# Patient Record
Sex: MALE | Race: WHITE | Employment: UNEMPLOYED | URBAN - METROPOLITAN AREA
[De-identification: names, ages, dates, MRNs, and addresses within clinical notes are randomized per-mention and may not be internally consistent; named-entity substitution may affect disease eponyms.]

---

## 2017-02-10 ENCOUNTER — APPOINTMENT (OUTPATIENT)
Dept: GENERAL RADIOLOGY | Age: 72
End: 2017-02-10
Attending: EMERGENCY MEDICINE
Payer: MEDICARE

## 2017-02-10 ENCOUNTER — HOSPITAL ENCOUNTER (EMERGENCY)
Age: 72
Discharge: HOME | End: 2017-02-10
Attending: EMERGENCY MEDICINE
Payer: MEDICARE

## 2017-02-10 VITALS
HEIGHT: 72 IN | TEMPERATURE: 98.1 F | BODY MASS INDEX: 24.38 KG/M2 | SYSTOLIC BLOOD PRESSURE: 157 MMHG | RESPIRATION RATE: 18 BRPM | WEIGHT: 180 LBS | HEART RATE: 97 BPM | DIASTOLIC BLOOD PRESSURE: 90 MMHG | OXYGEN SATURATION: 98 %

## 2017-02-10 DIAGNOSIS — E11.621 HEEL ULCER DUE TO DM (HCC): Primary | ICD-10-CM

## 2017-02-10 DIAGNOSIS — L97.409 HEEL ULCER DUE TO DM (HCC): Primary | ICD-10-CM

## 2017-02-10 PROCEDURE — 99282 EMERGENCY DEPT VISIT SF MDM: CPT | Performed by: EMERGENCY MEDICINE

## 2017-02-10 PROCEDURE — 74011000258 HC RX REV CODE- 258: Performed by: EMERGENCY MEDICINE

## 2017-02-10 PROCEDURE — 73630 X-RAY EXAM OF FOOT: CPT

## 2017-02-10 PROCEDURE — 74011250636 HC RX REV CODE- 250/636: Performed by: EMERGENCY MEDICINE

## 2017-02-10 NOTE — ED TRIAGE NOTES
Pt in from [facility] assisted living for pressure sore to right heel. Pt refuses to answer any questions in triage. Per staff that has accompanied pt, pt with wound to heel. States was sent to assisted living for unknown reason. Staff states pt c/o pain in heel at home.  Denies fever

## 2017-02-11 NOTE — DISCHARGE INSTRUCTIONS
Diabetes Foot Health: Care Instructions  Your Care Instructions    When you have diabetes, your feet need extra care and attention. Diabetes can damage the nerve endings and blood vessels in your feet, making you less likely to notice when your feet are injured. Diabetes also limits your body's ability to fight infection and get blood to areas that need it. If you get a minor foot injury, it could become an ulcer or a serious infection. With good foot care, you can prevent most of these problems. Caring for your feet can be quick and easy. Most of the care can be done when you are bathing or getting ready for bed. Follow-up care is a key part of your treatment and safety. Be sure to make and go to all appointments, and call your doctor if you are having problems. Its also a good idea to know your test results and keep a list of the medicines you take. How can you care for yourself at home? · Keep your blood sugar close to normal by watching what and how much you eat, monitoring blood sugar, taking medicines if prescribed, and getting regular exercise. · Do not smoke. Smoking affects blood flow and can make foot problems worse. If you need help quitting, talk to your doctor about stop-smoking programs and medicines. These can increase your chances of quitting for good. · Eat a diet that is low in fats. High fat intake can cause fat to build up in your blood vessels and decrease blood flow. · Inspect your feet daily for blisters, cuts, cracks, or sores. If you cannot see well, use a mirror or have someone help you. · Take care of your feet:  [facility] your feet every day. Use warm (not hot) water. Check the water temperature with your wrists or other part of your body, not your feet. ¨ Dry your feet well. Pat them dry. Do not rub the skin on your feet too hard. Dry well between your toes. If the skin on your feet stays moist, bacteria or a fungus can grow, which can lead to infection.   ¨ Keep your skin soft. Use moisturizing skin cream to keep the skin on your feet soft and prevent calluses and cracks. But do not put the cream between your toes, and stop using any cream that causes a rash. ¨ Clean underneath your toenails carefully. Do not use a sharp object to clean underneath your toenails. Use the blunt end of a nail file or other rounded tool. ¨ Trim and file your toenails straight across to prevent ingrown toenails. Use a nail clipper, not scissors. Use an emery board to smooth the edges. · Change socks daily. Socks without seams are best, because seams often rub the feet. You can find socks for people with diabetes from specialty catalogs. · Look inside your shoes every day for things like gravel or torn linings, which could cause blisters or sores. · Buy shoes that fit well:  ¨ Look for shoes that have plenty of space around the toes. This helps prevent bunions and blisters. ¨ Try on shoes while wearing the kind of socks you will usually wear with the shoes. ¨ Avoid plastic shoes. They may rub your feet and cause blisters. Good shoes should be made of materials that are flexible and breathable, such as leather or cloth. ¨ Break in new shoes slowly by wearing them for no more than an hour a day for several days. Take extra time to check your feet for red areas, blisters, or other problems after you wear new shoes. · Do not go barefoot. Do not wear sandals, and do not wear shoes with very thin soles. Thin soles are easy to puncture. They also do not protect your feet from hot pavement or cold weather. · Have your doctor check your feet during each visit. If you have a foot problem, see your doctor. Do not try to treat an early foot problem at home. Home remedies or treatments that you can buy without a prescription (such as corn removers) can be harmful. · Always get early treatment for foot problems. A minor irritation can lead to a major problem if not properly cared for early.   When should you call for help? Call your doctor now or seek immediate medical care if:  · You have a foot sore, an ulcer or break in the skin that is not healing after 4 days, bleeding corns or calluses, or an ingrown toenail. · You have blue or black areas, which can mean bruising or blood flow problems. · You have peeling skin or tiny blisters between your toes or cracking or oozing of the skin. · You have a fever for more than 24 hours and a foot sore. · You have new numbness or tingling in your feet that does not go away after you move your feet or change positions. · You have unexplained or unusual swelling of the foot or ankle. Watch closely for changes in your health, and be sure to contact your doctor if:  · You cannot do proper foot care. Where can you learn more? Go to http://mik-julia.info/. Enter A739 in the search box to learn more about \"Diabetes Foot Health: Care Instructions. \"  Current as of: May 23, 2016  Content Version: 11.1  © 4360-6959 [employer]. Care instructions adapted under license by [employer] (which disclaims liability or warranty for this information). If you have questions about a medical condition or this instruction, always ask your healthcare professional. [address] any warranty or liability for your use of this information.

## 2017-02-11 NOTE — ED NOTES
(LE) Richie Vásquez,  for [facility] given a courtesy call to inform him that the pt was returning to the facility. Mr Misti Brown began questioning what was sone. Attempted to explain to Mr Misti Brown that the ED MD had attempted to do a more extensive workup for the pt and the pt had refused all interventions. Mr Misti Brown then began demanding to speak to [facility] that knew what was going on and that Dr Long Forrest from the [place] needed to be called. \"  Attempted to explain to the  that our physicians were fully capable of evaluating the pt and treating per their training. Mr Misti Brown continued to interrupt my conversation about what i did know what i was doing and that the physician needed to call Dr Long Forrest. I abruptly ended the conversation by hanging up the phone.

## 2017-02-11 NOTE — ED NOTES
(CHARITO) report received from [facility], [address] [place] [address]. Awaiting disposition.   Pt has repeatedly re

## 2017-02-11 NOTE — ED PROVIDER NOTES
Patient is a 80 y.o. male presenting with skin problem. The history is provided by the patient and a caregiver. Skin Problem    This is a chronic problem. The current episode started more than 1 week ago. The problem has been gradually worsening. The problem is associated with an unknown factor. There has been no fever. The rash is present on the right foot. The pain has been constant since onset. Associated symptoms include pain. Treatments tried: records indicate that home health has been doing wound care. The treatment provided no relief. Past Medical History:   Diagnosis Date    Diabetes ([facility] [address]     Hypertension        History reviewed. No pertinent past surgical history. History reviewed. No pertinent family history. Social History     Social History    Marital status: UNKNOWN     Spouse name: N/A    Number of children: N/A    Years of education: N/A     Occupational History    Not on file. Social History Main Topics    Smoking status: Unknown If Ever Smoked    Smokeless tobacco: Not on file    Alcohol use Not on file    Drug use: Not on file    Sexual activity: Not on file     Other Topics Concern    Not on file     Social History Narrative    No narrative on file         ALLERGIES: Review of patient's allergies indicates no known allergies. Review of Systems   Unable to perform ROS: Dementia       Vitals:    02/10/17 1608   BP: 157/90   Pulse: 97   Resp: 18   Temp: 98.1 °F (36.7 °C)   SpO2: 98%   Weight: 81.6 kg (180 lb)   Height: 6' (1.829 m)            Physical Exam   Constitutional: He is oriented to person, place, and time. He appears well-developed and well-nourished. He appears distressed. HENT:   Head: Normocephalic and atraumatic. Mouth/Throat: Oropharynx is clear and moist.   Eyes: Conjunctivae and EOM are normal. Pupils are equal, round, and reactive to light. Right eye exhibits no discharge. Left eye exhibits no discharge. No scleral icterus.    Neck: Normal range of motion. Neck supple. No JVD present. Cardiovascular: Normal rate, regular rhythm, normal heart sounds and intact distal pulses. Exam reveals no gallop and no friction rub. No murmur heard. Pulmonary/Chest: Effort normal and breath sounds normal. No respiratory distress. He has no wheezes. Abdominal: Soft. Bowel sounds are normal. He exhibits no distension. There is no tenderness. There is no rebound and no guarding. Musculoskeletal: Normal range of motion. He exhibits no edema or tenderness. Right lateral heel reveals a quarter-sized ulceration extending into the subcutaneous tissue. There is no bone exposure. There is no induration around the wound. I see no active drainage or bleeding. Patient's wound dressing was intact at the time of my examination    Distal pulses are palpable   Neurological: He is alert and oriented to person, place, and time. He has normal strength. No cranial nerve deficit or sensory deficit. He exhibits normal muscle tone. GCS eye subscore is 4. GCS verbal subscore is 5. GCS motor subscore is 6. Skin: Skin is warm and dry. No rash noted. He is not diaphoretic. No erythema. Psychiatric: His speech is normal. His affect is angry. He is agitated. Thought content is paranoid. Cognition and memory are impaired. He expresses impulsivity. Nursing note and vitals reviewed. MDM  Number of Diagnoses or Management Options  Heel ulcer due to DM [facility]): established and worsening  Diagnosis management comments: 59-year-old male with a chronic right heel ulcer. Has been followed by home health at his living facility. Apparently sent in by the home health nurse due to concerns that this wound may be worsening. Records reveal that the patient is been started on Augmentin therapy. One day ago. Plain x-ray today does not reveal any evidence of osteomyelitis. Patient is refusing lab work.   Nursing staff and myself have attempted to get the patient to consent to lab testing, but we've been unsuccessful. Patient sat with a clenched fist on the stretcher during most of my discussion. Amount and/or Complexity of Data Reviewed  Clinical lab tests: ordered  Tests in the radiology section of CPT®: ordered and reviewed  Obtain history from someone other than the patient: yes  Review and summarize past medical records: yes    Risk of Complications, Morbidity, and/or Mortality  Presenting problems: moderate  Diagnostic procedures: moderate  Management options: low  General comments: Elements of this note have been dictated via voice recognition software. Text and phrases may be limited by the accuracy of the software. The chart has been reviewed, but errors may still be present. Patient Progress  Patient progress: stable    ED Course       Procedures      7:42 PM  Patient's case discussed with Dr. Ty Zendejas from the [place] system    We discussed the patient's x-rays and refusal of Blood work. He was concerned that the patient was not going to be accepted back at his facility tonight. He was pleased when I informed him that I had a discussion with the supervisor at his care facility and they were willing to take him back tonight. From a medical standpoint, I believe the patient is being treated appropriately. We did discu Hss follow-up MRI for confirmation of no osteomyelitis. Dr. Long Argue states he will be checking back in Monday with the staff at the facility to confirm the patient's plan. Patient is clearly going to be difficult to manage due to his underlying psychiatric disorders. At this time, I do not feel that he has presented any physical threats to himself or to others, that I've seen. And I'm hopeful that he will be old continued to be managed at his living facility with wound care intervention.

## 2017-02-11 NOTE — ED NOTES
(CHARITO) report received from [facility], [address] [facility]. PT does not appear in any distress. Awaiting disposition.   Pt has repeatedly refused any blood draws or other evaluation per the staff or the MD.

## 2017-02-19 ENCOUNTER — HOSPITAL ENCOUNTER (EMERGENCY)
Age: 72
Discharge: HOME | End: 2017-02-19
Attending: EMERGENCY MEDICINE
Payer: MEDICARE

## 2017-02-19 ENCOUNTER — APPOINTMENT (OUTPATIENT)
Dept: GENERAL RADIOLOGY | Age: 72
End: 2017-02-19
Attending: EMERGENCY MEDICINE
Payer: MEDICARE

## 2017-02-19 VITALS
HEIGHT: 67 IN | RESPIRATION RATE: 22 BRPM | BODY MASS INDEX: 24.96 KG/M2 | SYSTOLIC BLOOD PRESSURE: 145 MMHG | HEART RATE: 82 BPM | WEIGHT: 159 LBS | DIASTOLIC BLOOD PRESSURE: 75 MMHG | OXYGEN SATURATION: 94 % | TEMPERATURE: 98.4 F

## 2017-02-19 DIAGNOSIS — Z53.29 REFUSAL OF CARE BY PATIENT: ICD-10-CM

## 2017-02-19 DIAGNOSIS — R19.7 DIARRHEA, UNSPECIFIED TYPE: Primary | ICD-10-CM

## 2017-02-19 PROCEDURE — 99283 EMERGENCY DEPT VISIT LOW MDM: CPT | Performed by: EMERGENCY MEDICINE

## 2017-02-19 RX ORDER — GLIPIZIDE 10 MG/1
10 TABLET ORAL 2 TIMES DAILY
COMMUNITY
End: 2020-07-21

## 2017-02-19 RX ORDER — METFORMIN HYDROCHLORIDE 1000 MG/1
1000 TABLET ORAL 2 TIMES DAILY
COMMUNITY
End: 2020-07-21

## 2017-02-19 RX ORDER — DIPHENHYDRAMINE HCL 25 MG
25 CAPSULE ORAL 3 TIMES DAILY
COMMUNITY
End: 2020-07-21

## 2017-02-19 RX ORDER — LISINOPRIL 10 MG/1
10 TABLET ORAL DAILY
COMMUNITY
End: 2020-07-21

## 2017-02-19 RX ORDER — DIVALPROEX SODIUM 250 MG/1
250 TABLET, EXTENDED RELEASE ORAL 2 TIMES DAILY
COMMUNITY
End: 2020-07-21

## 2017-02-19 RX ORDER — PROPRANOLOL HYDROCHLORIDE 10 MG/1
TABLET ORAL DAILY
COMMUNITY
End: 2020-07-21

## 2017-02-19 RX ORDER — POLYETHYLENE GLYCOL 3350 17 G/17G
17 POWDER, FOR SOLUTION ORAL DAILY
COMMUNITY
End: 2020-07-21

## 2017-02-19 RX ORDER — GUAIFENESIN 100 MG/5ML
81 LIQUID (ML) ORAL
COMMUNITY
End: 2020-07-21

## 2017-02-19 RX ORDER — AMOXICILLIN 875 MG/1
875 TABLET, FILM COATED ORAL 2 TIMES DAILY
COMMUNITY
End: 2017-02-20

## 2017-02-19 NOTE — ED NOTES
Pt non-compliant, refusing treatment, verbally rude and aggressive. Pt Discharged back to [facility] per Dr. Isidro Parkinson. Clarita Bell here to  pt.

## 2017-02-19 NOTE — ED NOTES
Pt refused chest x-ray. Pt said it cant do nothing for him. Pt said he's not doing anything till he gets crackers and something to drink.

## 2017-02-19 NOTE — DISCHARGE INSTRUCTIONS
Diarrhea: Care Instructions  Your Care Instructions    Diarrhea is loose, watery stools (bowel movements). The exact cause is often hard to find. Sometimes diarrhea is your body's way of getting rid of what caused an upset stomach. Viruses, food poisoning, and many medicines can cause diarrhea. Some people get diarrhea in response to emotional stress, anxiety, or certain foods. Almost everyone has diarrhea now and then. It usually isn't serious, and your stools will return to normal soon. The important thing to do is replace the fluids you have lost, so you can prevent dehydration. The doctor has checked you carefully, but problems can develop later. If you notice any problems or new symptoms, get medical treatment right away. Follow-up care is a key part of your treatment and safety. Be sure to make and go to all appointments, and call your doctor if you are having problems. It's also a good idea to know your test results and keep a list of the medicines you take. How can you care for yourself at home? · Watch for signs of dehydration, which means your body has lost too much water. Dehydration is a serious condition and should be treated right away. Signs of dehydration are:  ¨ Increasing thirst and dry eyes and mouth. ¨ Feeling faint or lightheaded. ¨ Darker urine, and a smaller amount of urine than normal.  · To prevent dehydration, drink plenty of fluids, enough so that your urine is light yellow or clear like water. Choose water and other caffeine-free clear liquids until you feel better. If you have kidney, heart, or liver disease and have to limit fluids, talk with your doctor before you increase the amount of fluids you drink. · Begin eating small amounts of mild foods the next day, if you feel like it. ¨ Try yogurt that has live cultures of Lactobacillus. (Check the label.)  ¨ Avoid spicy foods, fruits, alcohol, and caffeine until 48 hours after all symptoms are gone.   ¨ Avoid chewing gum that contains sorbitol. ¨ Avoid dairy products (except for yogurt with Lactobacillus) while you have diarrhea and for 3 days after symptoms are gone. · The doctor may recommend that you take over-the-counter medicine, such as loperamide (Imodium), if you still have diarrhea after 6 hours. Read and follow all instructions on the label. Do not use this medicine if you have bloody diarrhea, a high fever, or other signs of serious illness. Call your doctor if you think you are having a problem with your medicine. When should you call for help? Call 911 anytime you think you may need emergency care. For example, call if:  · You passed out (lost consciousness). · Your stools are maroon or very bloody. Call your doctor now or seek immediate medical care if:  · You are dizzy or lightheaded, or you feel like you may faint. · Your stools are black and look like tar, or they have streaks of blood. · You have new or worse belly pain. · You have symptoms of dehydration, such as:  ¨ Dry eyes and a dry mouth. ¨ Passing only a little dark urine. ¨ Feeling thirstier than usual.  · You have a new or higher fever. Watch closely for changes in your health, and be sure to contact your doctor if:  · Your diarrhea is getting worse. · You see pus in the diarrhea. · You are not getting better after 2 days (48 hours). Where can you learn more? Go to http://mik-julia.info/. Enter P942 in the search box to learn more about \"Diarrhea: Care Instructions. \"  Current as of: May 27, 2016  Content Version: 11.1  © 1041-1823 [employer]. Care instructions adapted under license by [employer] (which disclaims liability or warranty for this information). If you have questions about a medical condition or this instruction, always ask your healthcare professional. [address] any warranty or liability for your use of this information.

## 2017-02-19 NOTE — ED TRIAGE NOTES
Pt angry, short tempered and hostile verbally when asked questions. When asked pt to rate pain pt stated,   \"that's a stupid question and I'm not going to answer it. \"  Pt said he has had diarrhea, cough and SOB x's 2 days.

## 2017-02-19 NOTE — PROGRESS NOTES
Pt refuses to go to x-ray at this time. Pt is hostile and referred to me a \"the bony ass that did my x-ray last time. \"-ARUNA

## 2017-02-20 ENCOUNTER — APPOINTMENT (OUTPATIENT)
Dept: GENERAL RADIOLOGY | Age: 72
End: 2017-02-20
Attending: EMERGENCY MEDICINE
Payer: MEDICARE

## 2017-02-20 ENCOUNTER — HOSPITAL ENCOUNTER (EMERGENCY)
Age: 72
Discharge: HOME | End: 2017-02-20
Attending: EMERGENCY MEDICINE
Payer: MEDICARE

## 2017-02-20 VITALS
SYSTOLIC BLOOD PRESSURE: 130 MMHG | OXYGEN SATURATION: 95 % | HEIGHT: 67 IN | BODY MASS INDEX: 24.95 KG/M2 | TEMPERATURE: 98 F | DIASTOLIC BLOOD PRESSURE: 68 MMHG | HEART RATE: 75 BPM | WEIGHT: 158.95 LBS | RESPIRATION RATE: 18 BRPM

## 2017-02-20 DIAGNOSIS — L97.412 HEEL ULCER, RIGHT, WITH FAT LAYER EXPOSED (HCC): ICD-10-CM

## 2017-02-20 DIAGNOSIS — R06.2 WHEEZING: Primary | ICD-10-CM

## 2017-02-20 PROCEDURE — 99283 EMERGENCY DEPT VISIT LOW MDM: CPT | Performed by: EMERGENCY MEDICINE

## 2017-02-20 PROCEDURE — 71010 XR CHEST PORT: CPT

## 2017-02-20 PROCEDURE — 74011000250 HC RX REV CODE- 250: Performed by: EMERGENCY MEDICINE

## 2017-02-20 PROCEDURE — 94640 AIRWAY INHALATION TREATMENT: CPT

## 2017-02-20 RX ORDER — LEVOFLOXACIN 750 MG/1
750 TABLET ORAL DAILY
Qty: 10 TAB | Refills: 0 | Status: SHIPPED | OUTPATIENT
Start: 2017-02-20 | End: 2019-09-11

## 2017-02-20 RX ORDER — ALBUTEROL SULFATE 0.83 MG/ML
5 SOLUTION RESPIRATORY (INHALATION)
Status: COMPLETED | OUTPATIENT
Start: 2017-02-20 | End: 2017-02-20

## 2017-02-20 RX ORDER — RISPERIDONE 2 MG/1
TABLET, ORALLY DISINTEGRATING ORAL
Qty: 20 TAB | Refills: 0 | Status: SHIPPED | OUTPATIENT
Start: 2017-02-20 | End: 2020-07-21

## 2017-02-20 RX ORDER — IPRATROPIUM BROMIDE AND ALBUTEROL SULFATE 2.5; .5 MG/3ML; MG/3ML
3 SOLUTION RESPIRATORY (INHALATION)
Status: COMPLETED | OUTPATIENT
Start: 2017-02-20 | End: 2017-02-20

## 2017-02-20 RX ORDER — ALBUTEROL SULFATE 90 UG/1
2 AEROSOL, METERED RESPIRATORY (INHALATION)
Qty: 1 INHALER | Refills: 0 | Status: SHIPPED | OUTPATIENT
Start: 2017-02-20

## 2017-02-20 RX ADMIN — IPRATROPIUM BROMIDE AND ALBUTEROL SULFATE 3 ML: 2.5; .5 SOLUTION RESPIRATORY (INHALATION) at 09:54

## 2017-02-20 RX ADMIN — ALBUTEROL SULFATE 5 MG: 2.5 SOLUTION RESPIRATORY (INHALATION) at 12:00
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
## 2017-02-20 NOTE — ED PROVIDER NOTES
HPI Comments: zafartempt to interview patient patient refuses to provide any information stating he has \"already talked to enough people today\". I have tried to explain it is important to go over issues of his concern with me since I'm the physician assigned to provide for his care. He once again states that he refuses to answer any further questions this time with quite a bit of profanity involved. He is aware he is at the emergency room is aware he come here for wish to be treated but is very clear that he refuses any further care. Patient does not have any component with it seems as though he has any lamp herself or other harm other than neglect of presenting complaint    Patient is a 80 y.o. male presenting with shortness of breath and diarrhea. The history is provided by the patient (RN). Shortness of Breath     Diarrhea    Associated symptoms include diarrhea. Past Medical History:   Diagnosis Date    Diabetes ([facility] [address]     Hypertension        No past surgical history on file. No family history on file. Social History     Social History    Marital status: UNKNOWN     Spouse name: N/A    Number of children: N/A    Years of education: N/A     Occupational History    Not on file. Social History Main Topics    Smoking status: Unknown If Ever Smoked    Smokeless tobacco: Not on file    Alcohol use Not on file    Drug use: Not on file    Sexual activity: Not on file     Other Topics Concern    Not on file     Social History Narrative    No narrative on file         ALLERGIES: Review of patient's allergies indicates no known allergies. Review of Systems   Unable to perform ROS: Other (refused)   Respiratory: Positive for shortness of breath. Gastrointestinal: Positive for diarrhea.        Vitals:    02/19/17 1631   BP: 145/75   Pulse: 82   Resp: 22   Temp: 98.4 °F (36.9 °C)   SpO2: 94%   Weight: 72.1 kg (159 lb)   Height: 5' 7\" (1.702 m)            Physical Exam   Constitutional: No distress. Eyes: No scleral icterus. Pulmonary/Chest: No respiratory distress. Neurological: Coordination normal.   Skin: He is not diaphoretic. Psychiatric: His affect is angry. He is aggressive. He is not hyperactive and not actively hallucinating. He expresses no homicidal and no suicidal ideation.         MDM  Number of Diagnoses or Management Options  Diarrhea, unspecified type:   Refusal of care by patient:   Diagnosis management comments: PATIENT REFUSES TO GIVE HISTORY OR ALLOW EXAM       Amount and/or Complexity of Data Reviewed  Decide to obtain previous medical records or to obtain history from someone other than the patient: yes      ED Course       Procedures

## 2017-02-20 NOTE — PROGRESS NOTES
Call to [facility] at request of Dr Adán Collado and spoke with Gigi Waller. States patient has been with them for approximately 2 mths and came from the [address] [facility] in [facility]. States this is the third time they have sent patient to ER at his own insistence and that the first two times he got there and refused treatment. States he is seen by Dr Ronaldo Steward who's cell # is [phone]. Gigi Waller [facility] NP ([phone]), who is under Dr Cain Fernandez supervision, writes patient's meds. Call to [facility] to discuss psych meds at Dr BRAXTON [facility] request.  Message left. Dr Adán Collado notified of above.

## 2017-02-20 NOTE — DISCHARGE INSTRUCTIONS
Wheezing or Bronchoconstriction: Care Instructions  Your Care Instructions  Wheezing is a whistling noise made during breathing. It occurs when the small airways, or bronchial tubes, that lead to your lungs swell or contract (spasm) and become narrow. This narrowing is called bronchoconstriction. When your airways constrict, it is hard for air to pass through and this makes it hard for you to breathe. Wheezing and bronchoconstriction can be caused by many problems, including:  · An infection such as the flu or a cold. · Allergies such as hay fever. · Diseases such as asthma or chronic obstructive pulmonary disease. · Smoking. Treatment for your wheezing depends on what is causing the problem. Your wheezing may get better without treatment. But you may need to pay attention to things that cause your wheezing and avoid them. Or you may need medicine to help treat the wheezing and to reduce the swelling or to relieve spasms in your lungs. Follow-up care is a key part of your treatment and safety. Be sure to make and go to all appointments, and call your doctor if you are having problems. It is also a good idea to know your test results and keep a list of the medicines you take. How can you care for yourself at home? · Take your medicine exactly as prescribed. Call your doctor if you think you are having a problem with your medicine. You will get more details on the specific medicine your doctor prescribes. · If your doctor prescribed antibiotics, take them as directed. Do not stop taking them just because you feel better. You need to take the full course of antibiotics. · Breathe moist air from a humidifier, hot shower, or sink filled with hot water. This may help ease your symptoms and make it easier for you to breathe. · If you have congestion in your nose and throat, drinking plenty of fluids, especially hot fluids, may help relieve your symptoms.  If you have kidney, heart, or liver disease and have to limit fluids, talk with your doctor before you increase the amount of fluids you drink. · If you have mucus in your airways, it may help to breathe deeply and cough. · Do not smoke or allow others to smoke around you. Smoking can make your wheezing worse. If you need help quitting, talk to your doctor about stop-smoking programs and medicines. These can increase your chances of quitting for good. · Avoid things that may cause your wheezing. These may include colds, smoke, air pollution, dust, pollen, pets, cockroaches, stress, and cold air. When should you call for help? Call 911 anytime you think you may need emergency care. For example, call if:  · You have severe trouble breathing. · You passed out (lost consciousness). Call your doctor now or seek immediate medical care if:  · You cough up yellow, dark brown, or bloody mucus (sputum). · You have new or worse shortness of breath. · Your wheezing is not getting better or it gets worse after you start taking your medicine. Watch closely for changes in your health, and be sure to contact your doctor if:  · You do not get better as expected. Where can you learn more? Go to http://mik-julia.info/. Enter 454 [id number] in the search box to learn more about \"Wheezing or Bronchoconstriction: Care Instructions. \"  Current as of: May 23, 2016  Content Version: 11.1  © 6258-7149 [employer]. Care instructions adapted under license by [employer] (which disclaims liability or warranty for this information). If you have questions about a medical condition or this instruction, always ask your healthcare professional. [place] [address] any warranty or liability for your use of this information.

## 2017-02-20 NOTE — ED NOTES
Pt here from [facility] for a c/o shortness of breath. Was seen here yesterday and [facility] on 2/17/17. Had a dose of 875 mg Augmentin per Med Rec sheet sent with pt. O2 sat is 96% on RA and is able to verbalize a barrage of cursing without difficulty. He has a fine red rash over his lower abdomen, left side of chest and arm, he states he doesn't know how long it has been there \"but it burns now that you mention it\".  He is uncooperative with Dr Hernandez Lose exam.

## 2017-02-20 NOTE — ED NOTES
Pt being transported back to nursing home at this time with [facility]

## 2018-06-10 ENCOUNTER — HOSPITAL ENCOUNTER (EMERGENCY)
Age: 73
Discharge: HOME | End: 2018-06-10
Attending: EMERGENCY MEDICINE
Payer: MEDICARE

## 2018-06-10 ENCOUNTER — APPOINTMENT (OUTPATIENT)
Dept: CT IMAGING | Age: 73
End: 2018-06-10
Attending: EMERGENCY MEDICINE
Payer: MEDICARE

## 2018-06-10 VITALS
OXYGEN SATURATION: 94 % | RESPIRATION RATE: 21 BRPM | HEIGHT: 67 IN | TEMPERATURE: 98.4 F | BODY MASS INDEX: 24.33 KG/M2 | HEART RATE: 79 BPM | SYSTOLIC BLOOD PRESSURE: 167 MMHG | WEIGHT: 155 LBS | DIASTOLIC BLOOD PRESSURE: 85 MMHG

## 2018-06-10 DIAGNOSIS — N30.00 ACUTE CYSTITIS WITHOUT HEMATURIA: ICD-10-CM

## 2018-06-10 DIAGNOSIS — G20 PARKINSON'S DISEASE (HCC): ICD-10-CM

## 2018-06-10 DIAGNOSIS — S00.81XA ABRASION OF FACE, INITIAL ENCOUNTER: ICD-10-CM

## 2018-06-10 DIAGNOSIS — R56.9 NEW ONSET SEIZURE (HCC): Primary | ICD-10-CM

## 2018-06-10 LAB
ALBUMIN SERPL-MCNC: 3.4 G/DL (ref 3.2–4.6)
ALBUMIN/GLOB SERPL: 0.9 {RATIO} (ref 1.2–3.5)
ALP SERPL-CCNC: 57 U/L (ref 50–136)
ALT SERPL-CCNC: 23 U/L (ref 12–65)
ANION GAP SERPL CALC-SCNC: 13 MMOL/L (ref 7–16)
AST SERPL-CCNC: 28 U/L (ref 15–37)
ATRIAL RATE: 133 BPM
BACTERIA URNS QL MICRO: 0 /HPF
BASOPHILS # BLD: 0 K/UL (ref 0–0.2)
BASOPHILS NFR BLD: 0 % (ref 0–2)
BILIRUB SERPL-MCNC: 0.3 MG/DL (ref 0.2–1.1)
BUN SERPL-MCNC: 29 MG/DL (ref 8–23)
CALCIUM SERPL-MCNC: 9.6 MG/DL (ref 8.3–10.4)
CALCULATED R AXIS, ECG10: 62 DEGREES
CALCULATED T AXIS, ECG11: 79 DEGREES
CASTS URNS QL MICRO: ABNORMAL /LPF
CHLORIDE SERPL-SCNC: 105 MMOL/L (ref 98–107)
CO2 SERPL-SCNC: 23 MMOL/L (ref 21–32)
CREAT SERPL-MCNC: 1.3 MG/DL (ref 0.8–1.5)
DIAGNOSIS, 93000: NORMAL
DIFFERENTIAL METHOD BLD: ABNORMAL
EOSINOPHIL # BLD: 0.2 K/UL (ref 0–0.8)
EOSINOPHIL NFR BLD: 2 % (ref 0.5–7.8)
EPI CELLS #/AREA URNS HPF: 0 /HPF
ERYTHROCYTE [DISTWIDTH] IN BLOOD BY AUTOMATED COUNT: 14.5 % (ref 11.9–14.6)
GLOBULIN SER CALC-MCNC: 3.9 G/DL (ref 2.3–3.5)
GLUCOSE SERPL-MCNC: 295 MG/DL (ref 65–100)
HCT VFR BLD AUTO: 40 % (ref 41.1–50.3)
HGB BLD-MCNC: 13.5 G/DL (ref 13.6–17.2)
IMM GRANULOCYTES # BLD: 0 K/UL (ref 0–0.5)
IMM GRANULOCYTES NFR BLD AUTO: 0 % (ref 0–5)
LACTATE BLD-SCNC: 2.6 MMOL/L (ref 0.5–1.9)
LYMPHOCYTES # BLD: 2.5 K/UL (ref 0.5–4.6)
LYMPHOCYTES NFR BLD: 22 % (ref 13–44)
MCH RBC QN AUTO: 31.4 PG (ref 26.1–32.9)
MCHC RBC AUTO-ENTMCNC: 33.8 G/DL (ref 31.4–35)
MCV RBC AUTO: 93 FL (ref 79.6–97.8)
MONOCYTES # BLD: 0.8 K/UL (ref 0.1–1.3)
MONOCYTES NFR BLD: 7 % (ref 4–12)
NEUTS SEG # BLD: 7.9 K/UL (ref 1.7–8.2)
NEUTS SEG NFR BLD: 69 % (ref 43–78)
PLATELET # BLD AUTO: 238 K/UL (ref 150–450)
PMV BLD AUTO: 10.6 FL (ref 10.8–14.1)
POTASSIUM SERPL-SCNC: 5 MMOL/L (ref 3.5–5.1)
PROT SERPL-MCNC: 7.3 G/DL (ref 6.3–8.2)
Q-T INTERVAL, ECG07: 344 MS
QRS DURATION, ECG06: 76 MS
QTC CALCULATION (BEZET), ECG08: 430 MS
RBC # BLD AUTO: 4.3 M/UL (ref 4.23–5.67)
RBC #/AREA URNS HPF: ABNORMAL /HPF
SODIUM SERPL-SCNC: 141 MMOL/L (ref 136–145)
TROPONIN I BLD-MCNC: 0 NG/ML (ref 0.02–0.05)
TROPONIN I BLD-MCNC: 0.06 NG/ML (ref 0.02–0.05)
TROPONIN I SERPL-MCNC: <0.02 NG/ML (ref 0.02–0.05)
VENTRICULAR RATE, ECG03: 94 BPM
WBC # BLD AUTO: 11.5 K/UL (ref 4.3–11.1)
WBC URNS QL MICRO: >100 /HPF

## 2018-06-10 PROCEDURE — 93005 ELECTROCARDIOGRAM TRACING: CPT | Performed by: EMERGENCY MEDICINE

## 2018-06-10 PROCEDURE — 84484 ASSAY OF TROPONIN QUANT: CPT

## 2018-06-10 PROCEDURE — 80053 COMPREHEN METABOLIC PANEL: CPT | Performed by: EMERGENCY MEDICINE

## 2018-06-10 PROCEDURE — 87088 URINE BACTERIA CULTURE: CPT | Performed by: EMERGENCY MEDICINE

## 2018-06-10 PROCEDURE — 96375 TX/PRO/DX INJ NEW DRUG ADDON: CPT | Performed by: EMERGENCY MEDICINE

## 2018-06-10 PROCEDURE — 99285 EMERGENCY DEPT VISIT HI MDM: CPT | Performed by: EMERGENCY MEDICINE

## 2018-06-10 PROCEDURE — 84484 ASSAY OF TROPONIN QUANT: CPT | Performed by: EMERGENCY MEDICINE

## 2018-06-10 PROCEDURE — 36415 COLL VENOUS BLD VENIPUNCTURE: CPT | Performed by: EMERGENCY MEDICINE

## 2018-06-10 PROCEDURE — 85025 COMPLETE CBC W/AUTO DIFF WBC: CPT | Performed by: EMERGENCY MEDICINE

## 2018-06-10 PROCEDURE — 87186 SC STD MICRODIL/AGAR DIL: CPT | Performed by: EMERGENCY MEDICINE

## 2018-06-10 PROCEDURE — 74011250636 HC RX REV CODE- 250/636: Performed by: EMERGENCY MEDICINE

## 2018-06-10 PROCEDURE — 87040 BLOOD CULTURE FOR BACTERIA: CPT | Performed by: EMERGENCY MEDICINE

## 2018-06-10 PROCEDURE — 70450 CT HEAD/BRAIN W/O DYE: CPT

## 2018-06-10 PROCEDURE — 83605 ASSAY OF LACTIC ACID: CPT

## 2018-06-10 PROCEDURE — 81003 URINALYSIS AUTO W/O SCOPE: CPT | Performed by: EMERGENCY MEDICINE

## 2018-06-10 PROCEDURE — 96367 TX/PROPH/DG ADDL SEQ IV INF: CPT | Performed by: EMERGENCY MEDICINE

## 2018-06-10 PROCEDURE — 87086 URINE CULTURE/COLONY COUNT: CPT | Performed by: EMERGENCY MEDICINE

## 2018-06-10 PROCEDURE — 96365 THER/PROPH/DIAG IV INF INIT: CPT | Performed by: EMERGENCY MEDICINE

## 2018-06-10 PROCEDURE — 81015 MICROSCOPIC EXAM OF URINE: CPT | Performed by: EMERGENCY MEDICINE

## 2018-06-10 PROCEDURE — 74011000258 HC RX REV CODE- 258: Performed by: EMERGENCY MEDICINE

## 2018-06-10 RX ORDER — LORAZEPAM 2 MG/ML
1 INJECTION INTRAMUSCULAR
Status: COMPLETED | OUTPATIENT
Start: 2018-06-10 | End: 2018-06-10

## 2018-06-10 RX ORDER — LEVETIRACETAM 250 MG/1
500 TABLET ORAL 2 TIMES DAILY
Qty: 120 TAB | Refills: 3 | Status: SHIPPED | OUTPATIENT
Start: 2018-06-10 | End: 2018-06-10

## 2018-06-10 RX ORDER — CEPHALEXIN 500 MG/1
500 CAPSULE ORAL 3 TIMES DAILY
Qty: 21 CAP | Refills: 0 | Status: SHIPPED | OUTPATIENT
Start: 2018-06-10 | End: 2018-06-17

## 2018-06-10 RX ORDER — CEPHALEXIN 500 MG/1
500 CAPSULE ORAL 3 TIMES DAILY
Qty: 21 CAP | Refills: 0 | Status: SHIPPED | OUTPATIENT
Start: 2018-06-10 | End: 2018-06-10

## 2018-06-10 RX ORDER — LEVETIRACETAM 250 MG/1
500 TABLET ORAL 2 TIMES DAILY
Qty: 120 TAB | Refills: 3 | Status: SHIPPED | OUTPATIENT
Start: 2018-06-10 | End: 2018-07-10

## 2018-06-10 RX ADMIN — CEFTRIAXONE SODIUM 1 G: 1 INJECTION, POWDER, FOR SOLUTION INTRAMUSCULAR; INTRAVENOUS at 18:52

## 2018-06-10 RX ADMIN — LORAZEPAM 1 MG: 2 INJECTION INTRAMUSCULAR; INTRAVENOUS at 16:20

## 2018-06-10 RX ADMIN — SODIUM CHLORIDE 1000 MG: 900 INJECTION, SOLUTION INTRAVENOUS at 16:58

## 2018-06-10 NOTE — ED PROVIDER NOTES
HPI Comments: Patient was brought in for a fall at an assisted living facility. History was obtained from EMS via the patient's nurse. He sustained an abrasion to his forehead and his nose. He is unsure as to why he fell. He has a history of Parkinson's disease and schizophrenia and diabetes. No other history is available as the patient is postictal.    Elements of this note were created using speech recognition software. As such, errors of speech recognition may be present. Patient is a 71 y.o. male presenting with fall. The history is provided by the EMS personnel. Fall          Past Medical History:   Diagnosis Date    Diabetes ([facility] [address]     Hypertension        No past surgical history on file. No family history on file. Social History     Social History    Marital status: UNKNOWN     Spouse name: N/A    Number of children: N/A    Years of education: N/A     Occupational History    Not on file. Social History Main Topics    Smoking status: Unknown If Ever Smoked    Smokeless tobacco: Not on file    Alcohol use Not on file    Drug use: Not on file    Sexual activity: Not on file     Other Topics Concern    Not on file     Social History Narrative    No narrative on file         ALLERGIES: Review of patient's allergies indicates no known allergies. Review of Systems   Unable to perform ROS: Mental status change       Vitals:    06/10/18 1418 06/10/18 1543 06/10/18 1546 06/10/18 1547   BP: 167/85 (!) 244/131 (!) 209/124 181/79   Pulse: 94 (!) 130 (!) 105    Resp: 18 15 20    Temp: 98.4 °F (36.9 °C)      SpO2: 95% 91%  93%   Weight: 70.3 kg (155 lb)      Height: 5' 7\" (1.702 m)               Physical Exam   Constitutional: He appears well-developed and well-nourished. HENT:   Head: Normocephalic. Abrasions to face and head as indicated   Eyes: Conjunctivae are normal. Pupils are equal, round, and reactive to light. Neck: Normal range of motion. Neck supple. Cardiovascular: Normal rate and regular rhythm. Pulmonary/Chest: Effort normal and breath sounds normal.   Musculoskeletal: He exhibits no edema or tenderness. Neurological:   Patient appears postictal, does not respond to voice commands   Skin: Skin is warm and dry. Nursing note and vitals reviewed. MDM  Number of Diagnoses or Management Options  Abrasion of face, initial encounter:   Acute cystitis without hematuria:   New onset seizure [facility]): new and requires workup  Parkinson's disease [facility]):   Diagnosis management comments: 4:20 PM I was unable to see the patient before he went off to CT scan. When he was being transported back from CT, he had a witnessed generalized tonic-clonic seizure. He is now post ictal. Per his records, he has no history of seizures. He will be given Keppra and Ativan here  7:15 PM patient is back to his baseline, answering questions appropriately. His troponin has increased slightly to 0.06 however this can be explained by his witnessed seizure. He has had no chest pain or complaints of anginal equivalents.   It is likely he had a seizure at the nursing home thus he will be started on Keppra       Amount and/or Complexity of Data Reviewed  Clinical lab tests: ordered and reviewed  Tests in the radiology section of CPT®: ordered and reviewed  Independent visualization of images, tracings, or specimens: yes    Risk of Complications, Morbidity, and/or Mortality  Presenting problems: moderate  Diagnostic procedures: moderate  Management options: moderate    Patient Progress  Patient progress: stable        ED Course       Procedures

## 2018-06-10 NOTE — ED TRIAGE NOTES
Pt is coming from [facility], pt is in the memory care section. Nursing staff reports pt had an unwitnessed fall by the staff, another resident said he was shaking however. Pt has a hx of Parkinson's, he fell out of his chair and onto his face. Pt has skin tear on the bridge of his nose. Pt was alert and responsive immediately after the fall. Pt does not remember the fall, he is on ASA, no other thinners on pt's MAR from the facility. /80, HR 90s, bgl 242.

## 2018-06-10 NOTE — ED NOTES
Tech bringing pt in room and noticed as pt enter room that he appeared to be seizing. MD to bedside. Pt appeared to have snoring respirations. Pt stopped seizure like activity and was taking deep breaths. Pupils PERRLA at this time. Pt now resting on the stretcher with vital signs decreasing to normal limits.

## 2018-06-10 NOTE — DISCHARGE INSTRUCTIONS
Seizure: Care Instructions  Your Care Instructions    Seizures are caused by abnormal patterns of electrical signals in the brain. They are different for each person. Seizures can affect movement, speech, vision, or awareness. Some people have only slight shaking of a hand and do not pass out. Other people may pass out and have violent shaking of the whole body. Some people appear to stare into space. They are awake, but they can't respond normally. Later, they may not remember what happened. You may need tests to identify the type and cause of the seizures. A seizure may occur only once, or you may have them more than one time. Taking medicines as directed and following up with your doctor may help keep you from having more seizures. The doctor has checked you carefully, but problems can develop later. If you notice any problems or new symptoms, get medical treatment right away. Follow-up care is a key part of your treatment and safety. Be sure to make and go to all appointments, and call your doctor if you are having problems. It's also a good idea to know your test results and keep a list of the medicines you take. How can you care for yourself at home? · Be safe with medicines. Take your medicines exactly as prescribed. Call your doctor if you think you are having a problem with your medicine. · Do not do any activity that could be dangerous to you or others until your doctor says it is safe to do so. For example, do not drive a car, operate machinery, swim, or climb ladders. · Be sure that anyone treating you for any health problem knows that you have had a seizure and what medicines you are taking for it. · Identify and avoid things that may make you more likely to have a seizure. These may include lack of sleep, alcohol or drug use, stress, or not eating. · Make sure you go to your follow-up appointment. When should you call for help? Call 911 anytime you think you may need emergency care. For example, call if:  ? · You have another seizure. ? · You have more than one seizure in 24 hours. ? · You have new symptoms, such as trouble walking, speaking, or thinking clearly. ?Call your doctor now or seek immediate medical care if:  ? · You are not acting normally. ? Watch closely for changes in your health, and be sure to contact your doctor if you have any problems. Where can you learn more? Go to http://mik-julia.info/. Enter R225 in the search box to learn more about \"Seizure: Care Instructions. \"  Current as of: October 14, 2016  Content Version: 11.4  © 0653-9240 [employer]. Care instructions adapted under license by [employer] (which disclaims liability or warranty for this information). If you have questions about a medical condition or this instruction, always ask your healthcare professional. [address] any warranty or liability for your use of this information.

## 2018-06-11 NOTE — ED NOTES
I have reviewed discharge instructions with the patient and . The patient verbalized understanding. Patient left ED via Discharge Method: stretcher to Nursing Home with EMS    Opportunity for questions and clarification provided. Patient given 2 scripts. To continue your aftercare when you leave the hospital, you may receive an automated call from our care team to check in on how you are doing. This is a free service and part of our promise to provide the best care and service to meet your aftercare needs.  If you have questions, or wish to unsubscribe from this service please call [phone]. Thank you for Choosing our [facility] Emergency Department.

## 2018-06-11 NOTE — ED NOTES
Attempted to call report to facility. Unable to get an answer but left a message to call back for report.

## 2018-06-14 LAB
BACTERIA SPEC CULT: ABNORMAL
BACTERIA SPEC CULT: ABNORMAL
SERVICE CMNT-IMP: ABNORMAL

## 2018-06-15 LAB
BACTERIA SPEC CULT: NORMAL
BACTERIA SPEC CULT: NORMAL
SERVICE CMNT-IMP: NORMAL
SERVICE CMNT-IMP: NORMAL

## 2019-09-07 ENCOUNTER — HOSPITAL ENCOUNTER (EMERGENCY)
Age: 74
Discharge: HOME | End: 2019-09-07
Attending: EMERGENCY MEDICINE
Payer: MEDICARE

## 2019-09-07 ENCOUNTER — APPOINTMENT (OUTPATIENT)
Dept: CT IMAGING | Age: 74
End: 2019-09-07
Attending: EMERGENCY MEDICINE
Payer: MEDICARE

## 2019-09-07 ENCOUNTER — APPOINTMENT (OUTPATIENT)
Dept: GENERAL RADIOLOGY | Age: 74
End: 2019-09-07
Attending: EMERGENCY MEDICINE
Payer: MEDICARE

## 2019-09-07 VITALS
HEART RATE: 68 BPM | TEMPERATURE: 98.1 F | WEIGHT: 155 LBS | RESPIRATION RATE: 19 BRPM | BODY MASS INDEX: 24.33 KG/M2 | OXYGEN SATURATION: 96 % | HEIGHT: 67 IN | DIASTOLIC BLOOD PRESSURE: 74 MMHG | SYSTOLIC BLOOD PRESSURE: 177 MMHG

## 2019-09-07 DIAGNOSIS — E83.42 HYPOMAGNESEMIA: ICD-10-CM

## 2019-09-07 DIAGNOSIS — G40.909 SEIZURE DISORDER (HCC): Primary | ICD-10-CM

## 2019-09-07 LAB
ANION GAP SERPL CALC-SCNC: 12 MMOL/L (ref 7–16)
BASOPHILS # BLD: 0.1 K/UL (ref 0–0.2)
BASOPHILS NFR BLD: 1 % (ref 0–2)
BUN SERPL-MCNC: 22 MG/DL (ref 8–23)
CALCIUM SERPL-MCNC: 9.5 MG/DL (ref 8.3–10.4)
CHLORIDE SERPL-SCNC: 107 MMOL/L (ref 98–107)
CO2 SERPL-SCNC: 26 MMOL/L (ref 21–32)
CREAT SERPL-MCNC: 1.1 MG/DL (ref 0.8–1.5)
DIFFERENTIAL METHOD BLD: ABNORMAL
EOSINOPHIL # BLD: 0.2 K/UL (ref 0–0.8)
EOSINOPHIL NFR BLD: 3 % (ref 0.5–7.8)
ERYTHROCYTE [DISTWIDTH] IN BLOOD BY AUTOMATED COUNT: 13.3 % (ref 11.9–14.6)
GLUCOSE SERPL-MCNC: 110 MG/DL (ref 65–100)
HCT VFR BLD AUTO: 40.6 % (ref 41.1–50.3)
HGB BLD-MCNC: 13 G/DL (ref 13.6–17.2)
IMM GRANULOCYTES # BLD AUTO: 0 K/UL (ref 0–0.5)
IMM GRANULOCYTES NFR BLD AUTO: 0 % (ref 0–5)
LYMPHOCYTES # BLD: 2.5 K/UL (ref 0.5–4.6)
LYMPHOCYTES NFR BLD: 29 % (ref 13–44)
MAGNESIUM SERPL-MCNC: 1.3 MG/DL (ref 1.8–2.4)
MCH RBC QN AUTO: 31.4 PG (ref 26.1–32.9)
MCHC RBC AUTO-ENTMCNC: 32 G/DL (ref 31.4–35)
MCV RBC AUTO: 98.1 FL (ref 79.6–97.8)
MONOCYTES # BLD: 0.5 K/UL (ref 0.1–1.3)
MONOCYTES NFR BLD: 6 % (ref 4–12)
NEUTS SEG # BLD: 5.2 K/UL (ref 1.7–8.2)
NEUTS SEG NFR BLD: 61 % (ref 43–78)
NRBC # BLD: 0 K/UL (ref 0–0.2)
PLATELET # BLD AUTO: 237 K/UL (ref 150–450)
PMV BLD AUTO: 10.4 FL (ref 9.4–12.3)
POTASSIUM SERPL-SCNC: 4.3 MMOL/L (ref 3.5–5.1)
RBC # BLD AUTO: 4.14 M/UL (ref 4.23–5.6)
SODIUM SERPL-SCNC: 145 MMOL/L (ref 136–145)
TROPONIN I BLD-MCNC: 0.01 NG/ML (ref 0.02–0.05)
VALPROATE SERPL-MCNC: 58 UG/ML (ref 50–100)
WBC # BLD AUTO: 8.5 K/UL (ref 4.3–11.1)

## 2019-09-07 PROCEDURE — 70450 CT HEAD/BRAIN W/O DYE: CPT

## 2019-09-07 PROCEDURE — 85025 COMPLETE CBC W/AUTO DIFF WBC: CPT

## 2019-09-07 PROCEDURE — 96365 THER/PROPH/DIAG IV INF INIT: CPT | Performed by: EMERGENCY MEDICINE

## 2019-09-07 PROCEDURE — 71045 X-RAY EXAM CHEST 1 VIEW: CPT

## 2019-09-07 PROCEDURE — 80048 BASIC METABOLIC PNL TOTAL CA: CPT

## 2019-09-07 PROCEDURE — 74011250636 HC RX REV CODE- 250/636: Performed by: EMERGENCY MEDICINE

## 2019-09-07 PROCEDURE — 99285 EMERGENCY DEPT VISIT HI MDM: CPT | Performed by: EMERGENCY MEDICINE

## 2019-09-07 PROCEDURE — 83735 ASSAY OF MAGNESIUM: CPT

## 2019-09-07 PROCEDURE — 80164 ASSAY DIPROPYLACETIC ACD TOT: CPT

## 2019-09-07 PROCEDURE — 93005 ELECTROCARDIOGRAM TRACING: CPT | Performed by: EMERGENCY MEDICINE

## 2019-09-07 PROCEDURE — 84484 ASSAY OF TROPONIN QUANT: CPT

## 2019-09-07 RX ORDER — MAGNESIUM SULFATE 1 G/100ML
1 INJECTION INTRAVENOUS ONCE
Status: COMPLETED | OUTPATIENT
Start: 2019-09-07 | End: 2019-09-07

## 2019-09-07 RX ADMIN — MAGNESIUM SULFATE HEPTAHYDRATE 1 G: 1 INJECTION, SOLUTION INTRAVENOUS at 11:00

## 2019-09-07 NOTE — ED PROVIDER NOTES
HPI:  69 male history of diabetes, hypertension, seizure sent in from assisted living facility with reported 5 minutes episode of tonic-clonic seizure while at breakfast.  Was sitting in the chair. Did not fall. Did not hit head. Otherwise acting normal according to medic report. Denies any chest pain, shortness of breath. He does not want to be here. ROS  Constitutional: No fever, no chills  Skin: no rash  Eye: No vision changes  ENMT: No sore throat  Respiratory: No shortness of breath, no cough  Cardiovascular: No chest pain, no palpitations  Gastrointestinal: No vomiting, no nausea, no diarrhea, no abdominal pain  : No dysuria  MSK: No back pain, no muscle pain, no joint pain  Neuro: No headache, no change in mental status, no numbness, no tingling, no weakness  Psych:   Endocrine:   All other review of systems positive per history of present illness and the above otherwise negative or noncontributory. Visit Vitals  /88 (BP 1 Location: Left arm, BP Patient Position: At rest)   Pulse 65   Temp 98 °F (36.7 °C)   Resp 20   Ht 5' 7\" (1.702 m)   Wt 70.3 kg (155 lb)   SpO2 98%   BMI 24.28 kg/m²     Past Medical History:   Diagnosis Date    Diabetes ([facility] [address]     Hypertension      History reviewed. No pertinent surgical history. Prior to Admission Medications   Prescriptions Last Dose Informant Patient Reported? Taking? albuterol (PROVENTIL HFA, VENTOLIN HFA, PROAIR HFA) 90 mcg/actuation inhaler   No No   Sig: Take 2 Puffs by inhalation every six (6) hours as needed for Wheezing. aspirin 81 mg chewable tablet   Yes No   Sig: Take 81 mg by mouth three (3) times daily as needed for Pain (as needed for pain or fever). diphenhydrAMINE (BENADRYL) 25 mg capsule   Yes No   Sig: Take 25 mg by mouth three (3) times daily. divalproex ER (DEPAKOTE ER) 250 mg ER tablet   Yes No   Sig: Take 250 mg by mouth two (2) times a day.    glipiZIDE (GLUCOTROL) 10 mg tablet   Yes No   Sig: Take 10 mg by mouth two (2) times a day. levoFLOXacin (LEVAQUIN) 750 mg tablet   No No   Sig: Take 1 Tab by mouth daily. lisinopril (PRINIVIL, ZESTRIL) 10 mg tablet   Yes No   Sig: Take 10 mg by mouth daily. Take 1 half tab every day by mouth   metFORMIN (GLUCOPHAGE) 1,000 mg tablet   Yes No   Sig: Take 1,000 mg by mouth two (2) times a day. polyethylene glycol (MIRALAX) 17 gram packet   Yes No   Sig: Take 17 g by mouth daily. propranolol (INDERAL) 10 mg tablet   Yes No   Sig: Take  by mouth daily. risperiDONE (RISPERDAL M-TAB) 2 mg disintegrating tablet   No No   Si tab every 8 hours as needed for agitation   sAXagliptin (ONGLYZA) 5 mg tab tablet   Yes No   Sig: Take  by mouth daily. Facility-Administered Medications: None         Adult Exam   General: alert, no acute distress  Head: normocephalic, atraumatic  ENT: moist mucous membranes  Neck: supple, non-tender; full range of motion  Cardiovascular: regular rate and rhythm, normal peripheral perfusion, no edema  Respiratory:  normal respirations; no wheezing, rales or rhonchi  Gastrointestinal: soft, non-tender; no rebound or guarding, no peritoneal signs, no distension  Back: non-tender, full range of motion  Musculoskeletal: normal range of motion, normal strength, no gross deformities  Neurological: alert and oriented x 4, right lower lip seems to be droopy compared to the left however normal facial movement when speaking. Speech is slightly slurred and difficult to understand. Unsure of chronicity. He does not have any teeth in place. When asked he stated has been this way for over a month. He thinks his caused by something they gave him  Psychiatric: He is very ornery. Does not want to be bothered. Does not want to be here. MDM:  Questionable seizure for 5 minutes. History scanned. Patient unable to give history. Report from medic very limited and extremely unhelpful. Nursing staff will call assisted living.   Will obtain CT head, chest x-ray since he has a cough. Abdomen is otherwise soft. Will obtain EKG, labs and reassess. EKG rate of 72 sinus rhythm with PVC noted. Normal axis and interval.  No STEMI or ischemic changes noted    10:14 AM  Nursing staff spoke with assisted living facility. Reported 5 minutes seizure activities. Did not fall. No head trauma. Speech is baseline. Slightly slower. He had recovered to baseline by the time medic arrived. No recent diarrhea, vomiting. Chest x-ray unremarkable. Labs showing hypomagnesemia at 1.3. He is otherwise asymptomatic at this time and is asking if he can go home. Will give 1 g of magnesium sulfate IV. CT head is pending. 11:31 AM  CT head unremarkable. Has not had any additional seizure activities here. Valproic acid within therapeutic range. Stable for discharge after magnesium repletion. Return precautions given. No further questions. Do not suspect CVA, TIA at this time. No signs of intracranial hemorrhage.     Recent Results (from the past 12 hour(s))   EKG, 12 LEAD, INITIAL    Collection Time: 09/07/19  9:03 AM   Result Value Ref Range    Ventricular Rate 72 BPM    Atrial Rate 72 BPM    P-R Interval 172 ms    QRS Duration 74 ms    Q-T Interval 396 ms    QTC Calculation (Bezet) 433 ms    Calculated P Axis 86 degrees    Calculated R Axis 78 degrees    Calculated T Axis 81 degrees    Diagnosis       Sinus rhythm with Premature supraventricular complexes  Nonspecific T wave abnormality  Abnormal ECG  When compared with ECG of 10-SETH-2018 14:29,  Premature supraventricular complexes are now Present     CBC WITH AUTOMATED DIFF    Collection Time: 09/07/19  9:14 AM   Result Value Ref Range    WBC 8.5 4.3 - 11.1 K/uL    RBC 4.14 (L) 4.23 - 5.6 M/uL    HGB 13.0 (L) 13.6 - 17.2 g/dL    HCT 40.6 (L) 41.1 - 50.3 %    MCV 98.1 (H) 79.6 - 97.8 FL    MCH 31.4 26.1 - 32.9 PG    MCHC 32.0 31.4 - 35.0 g/dL    RDW 13.3 11.9 - 14.6 %    PLATELET 392 293 - 610 K/uL    MPV 10.4 9.4 - 12.3 FL    ABSOLUTE NRBC 0.00 0.0 - 0.2 K/uL    DF AUTOMATED      NEUTROPHILS 61 43 - 78 %    LYMPHOCYTES 29 13 - 44 %    MONOCYTES 6 4.0 - 12.0 %    EOSINOPHILS 3 0.5 - 7.8 %    BASOPHILS 1 0.0 - 2.0 %    IMMATURE GRANULOCYTES 0 0.0 - 5.0 %    ABS. NEUTROPHILS 5.2 1.7 - 8.2 K/UL    ABS. LYMPHOCYTES 2.5 0.5 - 4.6 K/UL    ABS. MONOCYTES 0.5 0.1 - 1.3 K/UL    ABS. EOSINOPHILS 0.2 0.0 - 0.8 K/UL    ABS. BASOPHILS 0.1 0.0 - 0.2 K/UL    ABS. IMM. GRANS. 0.0 0.0 - 0.5 K/UL   METABOLIC PANEL, BASIC    Collection Time: 09/07/19  9:14 AM   Result Value Ref Range    Sodium 145 136 - 145 mmol/L    Potassium 4.3 3.5 - 5.1 mmol/L    Chloride 107 98 - 107 mmol/L    CO2 26 21 - 32 mmol/L    Anion gap 12 7 - 16 mmol/L    Glucose 110 (H) 65 - 100 mg/dL    BUN 22 8 - 23 MG/DL    Creatinine 1.10 0.8 - 1.5 MG/DL    GFR est AA >60 >60 ml/min/1.73m2    GFR est non-AA >60 >60 ml/min/1.73m2    Calcium 9.5 8.3 - 10.4 MG/DL   MAGNESIUM    Collection Time: 09/07/19  9:14 AM   Result Value Ref Range    Magnesium 1.3 (LL) 1.8 - 2.4 mg/dL   VALPROIC ACID    Collection Time: 09/07/19  9:14 AM   Result Value Ref Range    Valproic acid 58 50 - 100 ug/ml   POC TROPONIN-I    Collection Time: 09/07/19  9:19 AM   Result Value Ref Range    Troponin-I (POC) 0.01 (L) 0.02 - 0.05 ng/ml     Ct Head Wo Cont    Result Date: 9/7/2019  CT HEAD WITHOUT CONTRAST. INDICATION: Seizure and slurred speech. COMPARISON: 10 [facility]ian[facility] 2018. TECHNIQUE:   5 mm axial scans from the skull base to the vertex. Our CT scanners use one or more of the following:  Automated exposure control, adjustment of the mA and or kV according to patient size, iterative reconstruction. FINDINGS:  No acute intraparenchymal hemorrhage or abnormal extra-axial fluid collection. The ventricles are normal size. Bilateral white matter low attenuation is present, nonspecific, likely chronic small vessel disease. No midline shift or mass effect. There is symmetric atrophy.  Included portion of the paranasal sinuses and orbits grossly unremarkable. IMPRESSION:  Negative for acute intracranial abnormality. Chronic changes. Xr Chest Port    Result Date: 9/7/2019  CHEST X-RAY, one view. HISTORY:  Cough. Seizure. TECHNIQUE:  AP portable semierect view. COMPARISON: February 2017     FINDINGS / IMPRESSION :   Scar right lung apex remains. Otherwise normal portable chest       Dragon voice recognition software was used to create this note. Although the note has been reviewed and corrected where necessary, additional errors may have been overlooked and remain in the text.

## 2019-09-07 NOTE — ED NOTES
Pt has been calm, cooperative with intermittent paranoia and aggressiveness. Pt becomes frustrated easily. Called and spoke with Assisted living nurse. Nurse said pt's slurred speech is normal.  Nurse said pt has not had a recent fall. Nurse said pt had a witnessed grand mal seizure that lasted over 5min. Pt angry for being brought to ER and said he did not have a seizure and believes everyone is lying.

## 2019-09-07 NOTE — DISCHARGE INSTRUCTIONS
Follow-up with your doctor for checkup. Continue all current medication. Return if worsening symptoms.   Increase food with Magnesium

## 2019-09-07 NOTE — ED NOTES
Report called to [address]. I have reviewed discharge instructions with the [facility]  The caregiver verbalized understanding. Patient left ED via EMS to SNF with EMS). Opportunity for questions and clarification provided. Patient given 0 scripts. Pt in no distress. Pt left via EMS            To continue your aftercare when you leave the hospital, you may receive an automated call from our care team to check in on how you are doing. This is a free service and part of our promise to provide the best care and service to meet your aftercare needs.  If you have questions, or wish to unsubscribe from this service please call [phone]. Thank you for Choosing our [facility] [employer] [facility] Emergency Department.

## 2019-09-08 LAB
ATRIAL RATE: 72 BPM
CALCULATED P AXIS, ECG09: 86 DEGREES
CALCULATED R AXIS, ECG10: 78 DEGREES
CALCULATED T AXIS, ECG11: 81 DEGREES
DIAGNOSIS, 93000: NORMAL
P-R INTERVAL, ECG05: 172 MS
Q-T INTERVAL, ECG07: 396 MS
QRS DURATION, ECG06: 74 MS
QTC CALCULATION (BEZET), ECG08: 433 MS
VENTRICULAR RATE, ECG03: 72 BPM

## 2019-09-11 ENCOUNTER — APPOINTMENT (OUTPATIENT)
Dept: GENERAL RADIOLOGY | Age: 74
End: 2019-09-11
Attending: EMERGENCY MEDICINE
Payer: MEDICARE

## 2019-09-11 ENCOUNTER — HOSPITAL ENCOUNTER (EMERGENCY)
Age: 74
Discharge: HOME | End: 2019-09-11
Attending: EMERGENCY MEDICINE
Payer: MEDICARE

## 2019-09-11 VITALS
HEART RATE: 68 BPM | WEIGHT: 155 LBS | BODY MASS INDEX: 24.33 KG/M2 | RESPIRATION RATE: 19 BRPM | OXYGEN SATURATION: 96 % | TEMPERATURE: 98 F | DIASTOLIC BLOOD PRESSURE: 78 MMHG | HEIGHT: 67 IN | SYSTOLIC BLOOD PRESSURE: 168 MMHG

## 2019-09-11 DIAGNOSIS — R10.32 ABDOMINAL PAIN, LLQ (LEFT LOWER QUADRANT): ICD-10-CM

## 2019-09-11 DIAGNOSIS — E83.42 HYPOMAGNESEMIA: ICD-10-CM

## 2019-09-11 DIAGNOSIS — R56.9 SEIZURE (HCC): Primary | ICD-10-CM

## 2019-09-11 LAB
ALBUMIN SERPL-MCNC: 3.1 G/DL (ref 3.2–4.6)
ALBUMIN/GLOB SERPL: 0.8 {RATIO} (ref 1.2–3.5)
ALP SERPL-CCNC: 53 U/L (ref 50–136)
ALT SERPL-CCNC: 17 U/L (ref 12–65)
ANION GAP SERPL CALC-SCNC: 7 MMOL/L (ref 7–16)
AST SERPL-CCNC: 19 U/L (ref 15–37)
ATRIAL RATE: 85 BPM
BACTERIA URNS QL MICRO: 0 /HPF
BASOPHILS # BLD: 0 K/UL (ref 0–0.2)
BASOPHILS NFR BLD: 0 % (ref 0–2)
BILIRUB SERPL-MCNC: 0.3 MG/DL (ref 0.2–1.1)
BUN SERPL-MCNC: 21 MG/DL (ref 8–23)
CALCIUM SERPL-MCNC: 9.3 MG/DL (ref 8.3–10.4)
CALCULATED P AXIS, ECG09: 79 DEGREES
CALCULATED R AXIS, ECG10: 77 DEGREES
CALCULATED T AXIS, ECG11: 0 DEGREES
CASTS URNS QL MICRO: 0 /LPF
CHLORIDE SERPL-SCNC: 106 MMOL/L (ref 98–107)
CO2 SERPL-SCNC: 27 MMOL/L (ref 21–32)
CREAT SERPL-MCNC: 1 MG/DL (ref 0.8–1.5)
CRYSTALS URNS QL MICRO: 0 /LPF
DIAGNOSIS, 93000: NORMAL
DIFFERENTIAL METHOD BLD: ABNORMAL
EOSINOPHIL # BLD: 0.3 K/UL (ref 0–0.8)
EOSINOPHIL NFR BLD: 3 % (ref 0.5–7.8)
EPI CELLS #/AREA URNS HPF: NORMAL /HPF
ERYTHROCYTE [DISTWIDTH] IN BLOOD BY AUTOMATED COUNT: 14.1 % (ref 11.9–14.6)
GLOBULIN SER CALC-MCNC: 3.7 G/DL (ref 2.3–3.5)
GLUCOSE SERPL-MCNC: 196 MG/DL (ref 65–100)
HCT VFR BLD AUTO: 36.5 % (ref 41.1–50.3)
HGB BLD-MCNC: 11.8 G/DL (ref 13.6–17.2)
IMM GRANULOCYTES # BLD AUTO: 0 K/UL (ref 0–0.5)
IMM GRANULOCYTES NFR BLD AUTO: 0 % (ref 0–5)
LYMPHOCYTES # BLD: 2.3 K/UL (ref 0.5–4.6)
LYMPHOCYTES NFR BLD: 23 % (ref 13–44)
MAGNESIUM SERPL-MCNC: 1.6 MG/DL (ref 1.8–2.4)
MCH RBC QN AUTO: 31.6 PG (ref 26.1–32.9)
MCHC RBC AUTO-ENTMCNC: 32.3 G/DL (ref 31.4–35)
MCV RBC AUTO: 97.9 FL (ref 79.6–97.8)
MONOCYTES # BLD: 0.8 K/UL (ref 0.1–1.3)
MONOCYTES NFR BLD: 8 % (ref 4–12)
MUCOUS THREADS URNS QL MICRO: NORMAL /LPF
NEUTS SEG # BLD: 6.7 K/UL (ref 1.7–8.2)
NEUTS SEG NFR BLD: 66 % (ref 43–78)
NRBC # BLD: 0 K/UL (ref 0–0.2)
P-R INTERVAL, ECG05: 162 MS
PLATELET # BLD AUTO: 241 K/UL (ref 150–450)
PMV BLD AUTO: 11.7 FL (ref 9.4–12.3)
POTASSIUM SERPL-SCNC: 4.7 MMOL/L (ref 3.5–5.1)
PROT SERPL-MCNC: 6.8 G/DL (ref 6.3–8.2)
Q-T INTERVAL, ECG07: 360 MS
QRS DURATION, ECG06: 80 MS
QTC CALCULATION (BEZET), ECG08: 428 MS
RBC # BLD AUTO: 3.73 M/UL (ref 4.23–5.6)
RBC #/AREA URNS HPF: NORMAL /HPF
SODIUM SERPL-SCNC: 140 MMOL/L (ref 136–145)
VALPROATE SERPL-MCNC: 56 UG/ML (ref 50–100)
VENTRICULAR RATE, ECG03: 85 BPM
WBC # BLD AUTO: 10.2 K/UL (ref 4.3–11.1)
WBC URNS QL MICRO: NORMAL /HPF

## 2019-09-11 PROCEDURE — 74018 RADEX ABDOMEN 1 VIEW: CPT

## 2019-09-11 PROCEDURE — 80164 ASSAY DIPROPYLACETIC ACD TOT: CPT

## 2019-09-11 PROCEDURE — 74011250636 HC RX REV CODE- 250/636: Performed by: EMERGENCY MEDICINE

## 2019-09-11 PROCEDURE — 99284 EMERGENCY DEPT VISIT MOD MDM: CPT | Performed by: EMERGENCY MEDICINE

## 2019-09-11 PROCEDURE — 81015 MICROSCOPIC EXAM OF URINE: CPT

## 2019-09-11 PROCEDURE — 96374 THER/PROPH/DIAG INJ IV PUSH: CPT | Performed by: EMERGENCY MEDICINE

## 2019-09-11 PROCEDURE — 93005 ELECTROCARDIOGRAM TRACING: CPT | Performed by: EMERGENCY MEDICINE

## 2019-09-11 PROCEDURE — 85025 COMPLETE CBC W/AUTO DIFF WBC: CPT

## 2019-09-11 PROCEDURE — 80053 COMPREHEN METABOLIC PANEL: CPT

## 2019-09-11 PROCEDURE — 87086 URINE CULTURE/COLONY COUNT: CPT

## 2019-09-11 PROCEDURE — 83735 ASSAY OF MAGNESIUM: CPT

## 2019-09-11 PROCEDURE — 81003 URINALYSIS AUTO W/O SCOPE: CPT | Performed by: EMERGENCY MEDICINE

## 2019-09-11 RX ORDER — LORAZEPAM 0.5 MG/1
TABLET ORAL
COMMUNITY
End: 2020-07-21

## 2019-09-11 RX ORDER — ATORVASTATIN CALCIUM 40 MG/1
20 TABLET, FILM COATED ORAL
COMMUNITY
End: 2020-07-21

## 2019-09-11 RX ORDER — LEVETIRACETAM 750 MG/1
750 TABLET ORAL 2 TIMES DAILY
Qty: 60 TAB | Refills: 0 | Status: SHIPPED | OUTPATIENT
Start: 2019-09-11 | End: 2020-07-21

## 2019-09-11 RX ORDER — LEVETIRACETAM 5 MG/ML
500 INJECTION INTRAVASCULAR ONCE
Status: COMPLETED | OUTPATIENT
Start: 2019-09-11 | End: 2019-09-11

## 2019-09-11 RX ORDER — ALOGLIPTIN 6.25 MG/1
12.5 TABLET, FILM COATED ORAL
COMMUNITY
End: 2020-07-21

## 2019-09-11 RX ORDER — LEVETIRACETAM 1000 MG/1
1000 TABLET ORAL
COMMUNITY
Start: 2019-04-29 | End: 2019-09-11

## 2019-09-11 RX ORDER — AMANTADINE HYDROCHLORIDE 100 MG/1
100 CAPSULE, GELATIN COATED ORAL
COMMUNITY
End: 2020-07-21

## 2019-09-11 RX ORDER — LANOLIN ALCOHOL/MO/W.PET/CERES
400 CREAM (GRAM) TOPICAL 2 TIMES DAILY
Qty: 60 TAB | Refills: 0 | Status: SHIPPED | OUTPATIENT
Start: 2019-09-11 | End: 2020-07-21

## 2019-09-11 RX ORDER — TAMSULOSIN HYDROCHLORIDE 0.4 MG/1
0.4 CAPSULE ORAL
COMMUNITY
End: 2020-07-21

## 2019-09-11 RX ADMIN — LEVETIRACETAM 500 MG: 5 INJECTION INTRAVENOUS at 15:56

## 2019-09-11 NOTE — DISCHARGE INSTRUCTIONS
Make sure Mr. Xavi Hodges is drinking plenty of fluids and takes his MiraLAX. Increase Keppra to 750 mg twice a day. His magnesium level is still low. Start magnesium twice a day. Follow-up with primary care doctor or neurologist to see if medications need to be further adjusted and recheck blood levels. Mr. Xavi Hodges had some slight abdominal pain which appeared to be due to constipation. Patient Education        Seizure: Care Instructions  Your Care Instructions    Seizures are caused by abnormal patterns of electrical signals in the brain. They are different for each person. Seizures can affect movement, speech, vision, or awareness. Some people have only slight shaking of a hand and do not pass out. Other people may pass out and have violent shaking of the whole body. Some people appear to stare into space. They are awake, but they can't respond normally. Later, they may not remember what happened. You may need tests to identify the type and cause of the seizures. A seizure may occur only once, or you may have them more than one time. Taking medicines as directed and following up with your doctor may help keep you from having more seizures. The doctor has checked you carefully, but problems can develop later. If you notice any problems or new symptoms, get medical treatment right away. Follow-up care is a key part of your treatment and safety. Be sure to make and go to all appointments, and call your doctor if you are having problems. It's also a good idea to know your test results and keep a list of the medicines you take. How can you care for yourself at home? · Be safe with medicines. Take your medicines exactly as prescribed. Call your doctor if you think you are having a problem with your medicine. · Do not do any activity that could be dangerous to you or others until your doctor says it is safe to do so. For example, do not drive a car, operate machinery, swim, or climb ladders.   · Be sure that anyone treating you for any health problem knows that you have had a seizure and what medicines you are taking for it. · Identify and avoid things that may make you more likely to have a seizure. These may include lack of sleep, alcohol or drug use, stress, or not eating. · Make sure you go to your follow-up appointment. When should you call for help? Call 911 anytime you think you may need emergency care. For example, call if:    · You have another seizure.     · You have more than one seizure in 24 hours.     · You have new symptoms, such as trouble walking, speaking, or thinking clearly.    Call your doctor now or seek immediate medical care if:    · You are not acting normally.    Watch closely for changes in your health, and be sure to contact your doctor if you have any problems. Where can you learn more? Go to http://mik-julia.info/. Enter Y968 in the search box to learn more about \"Seizure: Care Instructions. \"  Current as of: March 28, 2019  Content Version: 12.1  © 5527-1342 [employer]. Care instructions adapted under license by [employer] (which disclaims liability or warranty for this information). If you have questions about a medical condition or this instruction, always ask your healthcare professional. [address] any warranty or liability for your use of this information. Patient Education        Abdominal Pain: Care Instructions  Your Care Instructions    Abdominal pain has many possible causes. Some aren't serious and get better on their own in a few days. Others need more testing and treatment. If your pain continues or gets worse, you need to be rechecked and may need more tests to find out what is wrong. You may need surgery to correct the problem. Don't ignore new symptoms, such as fever, nausea and vomiting, urination problems, pain that gets worse, and dizziness.  These may be signs of a more serious problem. Your doctor may have recommended a follow-up visit in the next 8 to 12 hours. If you are not getting better, you may need more tests or treatment. The doctor has checked you carefully, but problems can develop later. If you notice any problems or new symptoms, get medical treatment right away. Follow-up care is a key part of your treatment and safety. Be sure to make and go to all appointments, and call your doctor if you are having problems. It's also a good idea to know your test results and keep a list of the medicines you take. How can you care for yourself at home? · Rest until you feel better. · To prevent dehydration, drink plenty of fluids, enough so that your urine is light yellow or clear like water. Choose water and other caffeine-free clear liquids until you feel better. If you have kidney, heart, or liver disease and have to limit fluids, talk with your doctor before you increase the amount of fluids you drink. · If your stomach is upset, eat mild foods, such as rice, dry toast or crackers, bananas, and applesauce. Try eating several small meals instead of two or three large ones. · Wait until 48 hours after all symptoms have gone away before you have spicy foods, alcohol, and drinks that contain caffeine. · Do not eat foods that are high in fat. · Avoid anti-inflammatory medicines such as aspirin, ibuprofen (Advil, Motrin), and naproxen (Aleve). These can cause stomach upset. Talk to your doctor if you take daily aspirin for another health problem. When should you call for help? Call 911 anytime you think you may need emergency care.  For example, call if:    · You passed out (lost consciousness).     · You pass maroon or very bloody stools.     · You vomit blood or what looks like coffee grounds.     · You have new, severe belly pain.    Call your doctor now or seek immediate medical care if:    · Your pain gets worse, especially if it becomes focused in one area of your belly.     · You have a new or higher fever.     · Your stools are black and look like tar, or they have streaks of blood.     · You have unexpected vaginal bleeding.     · You have symptoms of a urinary tract infection. These may include:  ? Pain when you urinate. ? Urinating more often than usual.  ? Blood in your urine.     · You are dizzy or lightheaded, or you feel like you may faint.    Watch closely for changes in your health, and be sure to contact your doctor if:    · You are not getting better after 1 day (24 hours). Where can you learn more? Go to http://mik-julia.info/. Enter H524 in the search box to learn more about \"Abdominal Pain: Care Instructions. \"  Current as of: September 23, 2018  Content Version: 12.1  © 3789-6651 [employer]. Care instructions adapted under license by [employer] (which disclaims liability or warranty for this information). If you have questions about a medical condition or this instruction, always ask your healthcare professional. [place] [address] any warranty or liability for your use of this information.

## 2019-09-11 NOTE — ED TRIAGE NOTES
[facility] [place] [facility]. Ems states staff stated pt had seizure while at lunch today. States pt was shaking on right side. Pt seen Saturday for same complaint. bgl [id number] with ems. Pt with history of seizures.

## 2019-09-11 NOTE — ED PROVIDER NOTES
67-year-old gentleman that assisted living. Suffered a generalized seizure about 2 hours ago. Shaking more on the right side per EMS report. No medications given by EMS. Blood sugar was okay. Patient has history of seizures. Cannot give much history other than he denies any chest pain or shortness of breath to me. Also history of hypertension and diabetes. The history is provided by the patient, the nursing home and the EMS personnel. Seizure    This is a new problem. The current episode started 1 to 2 hours ago. The problem has been resolved. There was 1 seizure. The most recent episode lasted 2 to 5 minutes. Pertinent negatives include no chest pain. Characteristics include rhythmic jerking. Characteristics do not include loss of consciousness. The episode was witnessed. The seizure(s) had right-sided focality. There has been no fever. He reports no chest pain. Past Medical History:   Diagnosis Date    Diabetes ([facility] [address]     Hypertension        History reviewed. No pertinent surgical history. History reviewed. No pertinent family history.     Social History     Socioeconomic History    Marital status: UNKNOWN     Spouse name: Not on file    Number of children: Not on file    Years of education: Not on file    Highest education level: Not on file   Occupational History    Not on file   Social Needs    Financial resource strain: Not on file    Food insecurity:     Worry: Not on file     Inability: Not on file    Transportation needs:     Medical: Not on file     Non-medical: Not on file   Tobacco Use    Smoking status: Former Smoker     Years: 60.00    Smokeless tobacco: Never Used   Substance and Sexual Activity    Alcohol use: Not Currently    Drug use: Not on file    Sexual activity: Not on file   Lifestyle    Physical activity:     Days per week: Not on file     Minutes per session: Not on file    Stress: Not on file   Relationships    Social connections:     Talks on phone: Not on file     Gets together: Not on file     Attends Yazidism service: Not on file     Active member of club or organization: Not on file     Attends meetings of clubs or organizations: Not on file     Relationship status: Not on file    Intimate partner violence:     Fear of current or ex partner: Not on file     Emotionally abused: Not on file     Physically abused: Not on file     Forced sexual activity: Not on file   Other Topics Concern    Not on file   Social History Narrative    Not on file         ALLERGIES: Patient has no known allergies. Review of Systems   Unable to perform ROS: Dementia   Cardiovascular: Negative for chest pain. Neurological: Negative for loss of consciousness. Vitals:    09/11/19 1504   BP: 149/69   Pulse: 88   Resp: 18   Temp: 98 °F (36.7 °C)   SpO2: 96%   Weight: 70.3 kg (155 lb)   Height: 5' 7\" (1.702 m)            Physical Exam   Constitutional: He appears well-developed and well-nourished. No distress. HENT:   Head: Normocephalic and atraumatic. Right Ear: External ear normal.   Left Ear: External ear normal.   Mouth/Throat: Oropharynx is clear and moist. No oropharyngeal exudate. Eyes: Pupils are equal, round, and reactive to light. Conjunctivae and EOM are normal.   Neck: Normal range of motion. Neck supple. Cardiovascular: Normal rate, regular rhythm and intact distal pulses. No murmur heard. Pulmonary/Chest: Breath sounds normal. No respiratory distress. Abdominal: Soft. Bowel sounds are normal. He exhibits no mass. There is no tenderness. There is no rebound and no guarding. No hernia. Neurological: He is alert. He is disoriented. Gait normal.   Nl speech  Lower right face with some drooping of the corner of the right mouth. No focal weakness. Skin: Skin is warm and dry. Psychiatric: He has a normal mood and affect. His speech is normal.   Nursing note and vitals reviewed.        MDM  Number of Diagnoses or Management Options  Abdominal pain, LLQ (left lower quadrant):   Hypomagnesemia:   Seizure [facility]):   Diagnosis management comments: Check electrolytes and Depakote level. Do not believe cerebral imaging needs to be repeated. We will give low-dose Keppra IV here. 7:08 PM  Urine finally obtained after multiple attempts by multiple routes. Patient able to spontaneously void. No bacteria, some white cells, doubtful the technique was really a clean-catch midstream specimen. With lack of UTI symptoms we will culture the urine instead of empirically starting antibiotics at this point. Amount and/or Complexity of Data Reviewed  Clinical lab tests: ordered and reviewed  Tests in the medicine section of CPT®: ordered and reviewed  Review and summarize past medical records: yes (Seizure 1 week ago. Patient is had 3- CT scans within the last year. Also one MRI that was nondiagnostic. Patient has been placed on Keppra in the past and also Depakote. MAR shows 150 mg of Depakote at bedtime along with 500 mg twice a day of Keppra.)    Risk of Complications, Morbidity, and/or Mortality  Presenting problems: moderate  Diagnostic procedures: minimal  Management options: low    Patient Progress  Patient progress: stable         Procedures    Results Include:    Recent Results (from the past 24 hour(s))   CBC WITH AUTOMATED DIFF    Collection Time: 09/11/19  3:20 PM   Result Value Ref Range    WBC 10.2 4.3 - 11.1 K/uL    RBC 3.73 (L) 4.23 - 5.6 M/uL    HGB 11.8 (L) 13.6 - 17.2 g/dL    HCT 36.5 (L) 41.1 - 50.3 %    MCV 97.9 (H) 79.6 - 97.8 FL    MCH 31.6 26.1 - 32.9 PG    MCHC 32.3 31.4 - 35.0 g/dL    RDW 14.1 11.9 - 14.6 %    PLATELET 481 437 - 267 K/uL    MPV 11.7 9.4 - 12.3 FL    ABSOLUTE NRBC 0.00 0.0 - 0.2 K/uL    DF AUTOMATED      NEUTROPHILS 66 43 - 78 %    LYMPHOCYTES 23 13 - 44 %    MONOCYTES 8 4.0 - 12.0 %    EOSINOPHILS 3 0.5 - 7.8 %    BASOPHILS 0 0.0 - 2.0 %    IMMATURE GRANULOCYTES 0 0.0 - 5.0 %    ABS.  NEUTROPHILS 6. 7 1.7 - 8.2 K/UL    ABS. LYMPHOCYTES 2.3 0.5 - 4.6 K/UL    ABS. MONOCYTES 0.8 0.1 - 1.3 K/UL    ABS. EOSINOPHILS 0.3 0.0 - 0.8 K/UL    ABS. BASOPHILS 0.0 0.0 - 0.2 K/UL    ABS. IMM. GRANS. 0.0 0.0 - 0.5 K/UL   METABOLIC PANEL, COMPREHENSIVE    Collection Time: 09/11/19  3:20 PM   Result Value Ref Range    Sodium 140 136 - 145 mmol/L    Potassium 4.7 3.5 - 5.1 mmol/L    Chloride 106 98 - 107 mmol/L    CO2 27 21 - 32 mmol/L    Anion gap 7 7 - 16 mmol/L    Glucose 196 (H) 65 - 100 mg/dL    BUN 21 8 - 23 MG/DL    Creatinine 1.00 0.8 - 1.5 MG/DL    GFR est AA >60 >60 ml/min/1.73m2    GFR est non-AA >60 >60 ml/min/1.73m2    Calcium 9.3 8.3 - 10.4 MG/DL    Bilirubin, total 0.3 0.2 - 1.1 MG/DL    ALT (SGPT) 17 12 - 65 U/L    AST (SGOT) 19 15 - 37 U/L    Alk. phosphatase 53 50 - 136 U/L    Protein, total 6.8 6.3 - 8.2 g/dL    Albumin 3.1 (L) 3.2 - 4.6 g/dL    Globulin 3.7 (H) 2.3 - 3.5 g/dL    A-G Ratio 0.8 (L) 1.2 - 3.5     MAGNESIUM    Collection Time: 09/11/19  3:20 PM   Result Value Ref Range    Magnesium 1.6 (L) 1.8 - 2.4 mg/dL   VALPROIC ACID    Collection Time: 09/11/19  3:20 PM   Result Value Ref Range    Valproic acid 56 50 - 100 ug/ml   EKG, 12 LEAD, INITIAL    Collection Time: 09/11/19  3:42 PM   Result Value Ref Range    Ventricular Rate 85 BPM    Atrial Rate 85 BPM    P-R Interval 162 ms    QRS Duration 80 ms    Q-T Interval 360 ms    QTC Calculation (Bezet) 428 ms    Calculated P Axis 79 degrees    Calculated R Axis 77 degrees    Calculated T Axis 0 degrees    Diagnosis       !! AGE AND GENDER SPECIFIC ECG ANALYSIS !!   Sinus rhythm with frequent and consecutive Premature ventricular complexes  Nonspecific T wave abnormality  Abnormal ECG  When compared with ECG of 07-SEP-2019 09:03,  Premature ventricular complexes are now Present  Premature supraventricular complexes are no longer Present  Nonspecific T wave abnormality, worse in Inferior leads  Nonspecific T wave abnormality, improved in Lateral leads EKG is essentially unchanged. Patient states some left lower quadrant pain states she is had some kidney problems before. Will check urine and KUB. Speech is more clear now I believe he has some slight postictal state earlier. Slight right lip drooping is less. That may be a small representation of Ethan's paralysis. Magnesium low. Xr Abd (kub)    Result Date: 9/11/2019  KUB CLINICAL INDICATION: Mid abdominal pain after a seizure FINDINGS: Two supine views of the abdomen and pelvis submitted. There is a nonspecific bowel gas pattern with air and stool noted throughout a nondilated colon including over the rectum. No dilated loops of small bowel evident. Atherosclerotic vascular calcifications are noted. The bones are unremarkable. IMPRESSION: No acute abdominal or pelvic abnormality.

## 2019-09-12 NOTE — ED NOTES
I have reviewed discharge instructions with the patient. The patient verbalized understanding. Patient left ED via Discharge Method: stretcher to SNF with [facility]). Opportunity for questions and clarification provided. Patient given 2 scripts. To continue your aftercare when you leave the hospital, you may receive an automated call from our care team to check in on how you are doing. This is a free service and part of our promise to provide the best care and service to meet your aftercare needs.  If you have questions, or wish to unsubscribe from this service please call [phone]. Thank you for Choosing our [facility] Emergency Department.

## 2019-09-14 LAB
BACTERIA SPEC CULT: NORMAL
SERVICE CMNT-IMP: NORMAL

## 2020-07-09 ENCOUNTER — APPOINTMENT (OUTPATIENT)
Dept: CT IMAGING | Age: 75
DRG: 100 | End: 2020-07-09
Attending: EMERGENCY MEDICINE
Payer: MEDICARE

## 2020-07-09 ENCOUNTER — APPOINTMENT (OUTPATIENT)
Dept: GENERAL RADIOLOGY | Age: 75
DRG: 100 | End: 2020-07-09
Attending: EMERGENCY MEDICINE
Payer: MEDICARE

## 2020-07-09 ENCOUNTER — HOSPITAL ENCOUNTER (INPATIENT)
Age: 75
LOS: 1 days | Discharge: OTHER INSTITUTION | DRG: 100 | End: 2020-07-10
Attending: EMERGENCY MEDICINE | Admitting: INTERNAL MEDICINE
Payer: MEDICARE

## 2020-07-09 DIAGNOSIS — G40.901 STATUS EPILEPTICUS (HCC): Primary | ICD-10-CM

## 2020-07-09 DIAGNOSIS — J96.02 ACUTE RESPIRATORY FAILURE WITH HYPOXIA AND HYPERCAPNIA (HCC): ICD-10-CM

## 2020-07-09 DIAGNOSIS — J96.01 ACUTE RESPIRATORY FAILURE WITH HYPOXIA AND HYPERCAPNIA (HCC): ICD-10-CM

## 2020-07-09 LAB
ALBUMIN SERPL-MCNC: 3.7 G/DL (ref 3.2–4.6)
ALBUMIN/GLOB SERPL: 1.1 {RATIO} (ref 1.2–3.5)
ALP SERPL-CCNC: 54 U/L (ref 50–136)
ALT SERPL-CCNC: 26 U/L (ref 12–65)
AMPHET UR QL SCN: NEGATIVE
ANION GAP SERPL CALC-SCNC: 10 MMOL/L (ref 7–16)
ANION GAP SERPL CALC-SCNC: 10 MMOL/L (ref 7–16)
APPEARANCE UR: CLEAR
ARTERIAL PATENCY WRIST A: YES
ARTERIAL PATENCY WRIST A: YES
AST SERPL-CCNC: 34 U/L (ref 15–37)
BACTERIA URNS QL MICRO: 0 /HPF
BARBITURATES UR QL SCN: NEGATIVE
BASE DEFICIT BLD-SCNC: 15 MMOL/L
BASE DEFICIT BLD-SCNC: 8 MMOL/L
BASOPHILS # BLD: 0.1 K/UL (ref 0–0.2)
BASOPHILS NFR BLD: 0 % (ref 0–2)
BDY SITE: ABNORMAL
BDY SITE: ABNORMAL
BENZODIAZ UR QL: NEGATIVE
BILIRUB SERPL-MCNC: 0.4 MG/DL (ref 0.2–1.1)
BILIRUB UR QL: NEGATIVE
BUN SERPL-MCNC: 24 MG/DL (ref 8–23)
BUN SERPL-MCNC: 24 MG/DL (ref 8–23)
CALCIUM SERPL-MCNC: 7.9 MG/DL (ref 8.3–10.4)
CALCIUM SERPL-MCNC: 9.2 MG/DL (ref 8.3–10.4)
CANNABINOIDS UR QL SCN: NEGATIVE
CASTS URNS QL MICRO: ABNORMAL /LPF
CHLORIDE SERPL-SCNC: 107 MMOL/L (ref 98–107)
CHLORIDE SERPL-SCNC: 111 MMOL/L (ref 98–107)
CK SERPL-CCNC: 117 U/L (ref 21–215)
CO2 BLD-SCNC: 14 MMOL/L
CO2 BLD-SCNC: 18 MMOL/L
CO2 SERPL-SCNC: 18 MMOL/L (ref 21–32)
CO2 SERPL-SCNC: 21 MMOL/L (ref 21–32)
COCAINE UR QL SCN: NEGATIVE
COLLECT TIME,HTIME: 1945
COLLECT TIME,HTIME: 2100
COLOR UR: YELLOW
CREAT SERPL-MCNC: 1.27 MG/DL (ref 0.8–1.5)
CREAT SERPL-MCNC: 1.27 MG/DL (ref 0.8–1.5)
DIFFERENTIAL METHOD BLD: ABNORMAL
EOSINOPHIL # BLD: 0.2 K/UL (ref 0–0.8)
EOSINOPHIL NFR BLD: 2 % (ref 0.5–7.8)
EPI CELLS #/AREA URNS HPF: ABNORMAL /HPF
ERYTHROCYTE [DISTWIDTH] IN BLOOD BY AUTOMATED COUNT: 13.1 % (ref 11.9–14.6)
ETHANOL SERPL-MCNC: <3 MG/DL
EXHALED MINUTE VOLUME, VE: 11 L/MIN
GAS FLOW.O2 O2 DELIVERY SYS: ABNORMAL L/MIN
GAS FLOW.O2 O2 DELIVERY SYS: ABNORMAL L/MIN
GAS FLOW.O2 SETTING OXYMISER: 22 BPM
GLOBULIN SER CALC-MCNC: 3.5 G/DL (ref 2.3–3.5)
GLUCOSE BLD STRIP.AUTO-MCNC: 242 MG/DL (ref 65–100)
GLUCOSE SERPL-MCNC: 218 MG/DL (ref 65–100)
GLUCOSE SERPL-MCNC: 224 MG/DL (ref 65–100)
GLUCOSE UR STRIP.AUTO-MCNC: 100 MG/DL
HCO3 BLD-SCNC: 13.2 MMOL/L (ref 22–26)
HCO3 BLD-SCNC: 16.8 MMOL/L (ref 22–26)
HCT VFR BLD AUTO: 38.4 % (ref 41.1–50.3)
HGB BLD-MCNC: 12.6 G/DL (ref 13.6–17.2)
HGB UR QL STRIP: ABNORMAL
IMM GRANULOCYTES # BLD AUTO: 0 K/UL (ref 0–0.5)
IMM GRANULOCYTES NFR BLD AUTO: 0 % (ref 0–5)
INSPIRATION.DURATION SETTING TIME VENT: 0.9 SEC
KETONES UR QL STRIP.AUTO: ABNORMAL MG/DL
LACTATE SERPL-SCNC: 5.7 MMOL/L (ref 0.4–2)
LEUKOCYTE ESTERASE UR QL STRIP.AUTO: NEGATIVE
LYMPHOCYTES # BLD: 1.7 K/UL (ref 0.5–4.6)
LYMPHOCYTES NFR BLD: 15 % (ref 13–44)
MAGNESIUM SERPL-MCNC: 2.1 MG/DL (ref 1.8–2.4)
MCH RBC QN AUTO: 31.7 PG (ref 26.1–32.9)
MCHC RBC AUTO-ENTMCNC: 32.8 G/DL (ref 31.4–35)
MCV RBC AUTO: 96.5 FL (ref 79.6–97.8)
METHADONE UR QL: NEGATIVE
MONOCYTES # BLD: 0.8 K/UL (ref 0.1–1.3)
MONOCYTES NFR BLD: 7 % (ref 4–12)
NEUTS SEG # BLD: 9.1 K/UL (ref 1.7–8.2)
NEUTS SEG NFR BLD: 77 % (ref 43–78)
NITRITE UR QL STRIP.AUTO: NEGATIVE
NRBC # BLD: 0 K/UL (ref 0–0.2)
O2/TOTAL GAS SETTING VFR VENT: 50 %
OPIATES UR QL: NEGATIVE
PCO2 BLD: 30.2 MMHG (ref 35–45)
PCO2 BLD: 37.9 MMHG (ref 35–45)
PCP UR QL: NEGATIVE
PEEP RESPIRATORY: 8 CMH2O
PH BLD: 7.15 [PH] (ref 7.35–7.45)
PH BLD: 7.35 [PH] (ref 7.35–7.45)
PH UR STRIP: 5.5 [PH] (ref 5–9)
PLATELET # BLD AUTO: 231 K/UL (ref 150–450)
PMV BLD AUTO: 10.4 FL (ref 9.4–12.3)
PO2 BLD: 106 MMHG (ref 75–100)
PO2 BLD: 119 MMHG (ref 75–100)
POTASSIUM SERPL-SCNC: 4.8 MMOL/L (ref 3.5–5.1)
POTASSIUM SERPL-SCNC: 5.4 MMOL/L (ref 3.5–5.1)
PROT SERPL-MCNC: 7.2 G/DL (ref 6.3–8.2)
PROT UR STRIP-MCNC: 100 MG/DL
RBC # BLD AUTO: 3.98 M/UL (ref 4.23–5.6)
RBC #/AREA URNS HPF: ABNORMAL /HPF
SAO2 % BLD: 97 % (ref 95–98)
SAO2 % BLD: 98 % (ref 95–98)
SERVICE CMNT-IMP: ABNORMAL
SODIUM SERPL-SCNC: 138 MMOL/L (ref 136–145)
SODIUM SERPL-SCNC: 139 MMOL/L (ref 136–145)
SP GR UR REFRACTOMETRY: 1.02 (ref 1–1.02)
SPECIMEN TYPE: ABNORMAL
SPECIMEN TYPE: ABNORMAL
UROBILINOGEN UR QL STRIP.AUTO: 0.2 EU/DL (ref 0.2–1)
VENTILATION MODE VENT: ABNORMAL
VT SETTING VENT: 500 ML
WBC # BLD AUTO: 11.9 K/UL (ref 4.3–11.1)
WBC URNS QL MICRO: ABNORMAL /HPF

## 2020-07-09 PROCEDURE — 71045 X-RAY EXAM CHEST 1 VIEW: CPT

## 2020-07-09 PROCEDURE — 87205 SMEAR GRAM STAIN: CPT

## 2020-07-09 PROCEDURE — 65270000029 HC RM PRIVATE

## 2020-07-09 PROCEDURE — 51702 INSERT TEMP BLADDER CATH: CPT

## 2020-07-09 PROCEDURE — 74011250636 HC RX REV CODE- 250/636

## 2020-07-09 PROCEDURE — 82550 ASSAY OF CK (CPK): CPT

## 2020-07-09 PROCEDURE — 80053 COMPREHEN METABOLIC PANEL: CPT

## 2020-07-09 PROCEDURE — 82803 BLOOD GASES ANY COMBINATION: CPT

## 2020-07-09 PROCEDURE — 87040 BLOOD CULTURE FOR BACTERIA: CPT

## 2020-07-09 PROCEDURE — 74011000258 HC RX REV CODE- 258: Performed by: EMERGENCY MEDICINE

## 2020-07-09 PROCEDURE — 83735 ASSAY OF MAGNESIUM: CPT

## 2020-07-09 PROCEDURE — 70450 CT HEAD/BRAIN W/O DYE: CPT

## 2020-07-09 PROCEDURE — 83605 ASSAY OF LACTIC ACID: CPT

## 2020-07-09 PROCEDURE — 82962 GLUCOSE BLOOD TEST: CPT

## 2020-07-09 PROCEDURE — 99285 EMERGENCY DEPT VISIT HI MDM: CPT

## 2020-07-09 PROCEDURE — 85025 COMPLETE CBC W/AUTO DIFF WBC: CPT

## 2020-07-09 PROCEDURE — 96361 HYDRATE IV INFUSION ADD-ON: CPT

## 2020-07-09 PROCEDURE — 74011000250 HC RX REV CODE- 250: Performed by: EMERGENCY MEDICINE

## 2020-07-09 PROCEDURE — 36600 WITHDRAWAL OF ARTERIAL BLOOD: CPT

## 2020-07-09 PROCEDURE — 81001 URINALYSIS AUTO W/SCOPE: CPT

## 2020-07-09 PROCEDURE — 93005 ELECTROCARDIOGRAM TRACING: CPT | Performed by: EMERGENCY MEDICINE

## 2020-07-09 PROCEDURE — 96375 TX/PRO/DX INJ NEW DRUG ADDON: CPT

## 2020-07-09 PROCEDURE — 80307 DRUG TEST PRSMV CHEM ANLYZR: CPT

## 2020-07-09 PROCEDURE — 94002 VENT MGMT INPAT INIT DAY: CPT

## 2020-07-09 PROCEDURE — 77030031476 HC EXCH HEAT MOISTW FLTR HALY -A

## 2020-07-09 PROCEDURE — 96374 THER/PROPH/DIAG INJ IV PUSH: CPT

## 2020-07-09 PROCEDURE — 96376 TX/PRO/DX INJ SAME DRUG ADON: CPT

## 2020-07-09 PROCEDURE — 74011250636 HC RX REV CODE- 250/636: Performed by: EMERGENCY MEDICINE

## 2020-07-09 PROCEDURE — 31500 INSERT EMERGENCY AIRWAY: CPT

## 2020-07-09 RX ORDER — ONDANSETRON 2 MG/ML
4 INJECTION INTRAMUSCULAR; INTRAVENOUS
Status: COMPLETED | OUTPATIENT
Start: 2020-07-09 | End: 2020-07-09

## 2020-07-09 RX ORDER — SUCCINYLCHOLINE CHLORIDE 20 MG/ML
200 INJECTION INTRAMUSCULAR; INTRAVENOUS
Status: COMPLETED | OUTPATIENT
Start: 2020-07-09 | End: 2020-07-09

## 2020-07-09 RX ORDER — LORAZEPAM 2 MG/ML
INJECTION INTRAMUSCULAR
Status: DISCONTINUED
Start: 2020-07-09 | End: 2020-07-10 | Stop reason: HOSPADM

## 2020-07-09 RX ORDER — LORAZEPAM 2 MG/ML
1 INJECTION INTRAMUSCULAR
Status: COMPLETED | OUTPATIENT
Start: 2020-07-09 | End: 2020-07-09

## 2020-07-09 RX ORDER — PHENYLEPHRINE HCL IN 0.9% NACL 0.8MG/10ML
80 SYRINGE (ML) INTRAVENOUS ONCE
Status: COMPLETED | OUTPATIENT
Start: 2020-07-09 | End: 2020-07-09

## 2020-07-09 RX ORDER — MIDAZOLAM HYDROCHLORIDE 1 MG/ML
INJECTION, SOLUTION INTRAMUSCULAR; INTRAVENOUS
Status: COMPLETED
Start: 2020-07-09 | End: 2020-07-09

## 2020-07-09 RX ORDER — MIDAZOLAM IN 0.9 % SOD.CHLORID 1 MG/ML
0.5 PLASTIC BAG, INJECTION (ML) INTRAVENOUS CONTINUOUS
Status: DISCONTINUED | OUTPATIENT
Start: 2020-07-09 | End: 2020-07-10 | Stop reason: HOSPADM

## 2020-07-09 RX ORDER — MIDAZOLAM HYDROCHLORIDE 1 MG/ML
10 INJECTION, SOLUTION INTRAMUSCULAR; INTRAVENOUS ONCE
Status: COMPLETED | OUTPATIENT
Start: 2020-07-09 | End: 2020-07-09

## 2020-07-09 RX ORDER — PROPOFOL 10 MG/ML
INJECTION, EMULSION INTRAVENOUS
Status: COMPLETED
Start: 2020-07-09 | End: 2020-07-09

## 2020-07-09 RX ORDER — SODIUM CHLORIDE 9 MG/ML
125 INJECTION, SOLUTION INTRAVENOUS CONTINUOUS
Status: DISCONTINUED | OUTPATIENT
Start: 2020-07-09 | End: 2020-07-10 | Stop reason: HOSPADM

## 2020-07-09 RX ORDER — LORAZEPAM 2 MG/ML
2 INJECTION INTRAMUSCULAR ONCE
Status: COMPLETED | OUTPATIENT
Start: 2020-07-09 | End: 2020-07-09

## 2020-07-09 RX ORDER — ETOMIDATE 2 MG/ML
30 INJECTION INTRAVENOUS ONCE
Status: COMPLETED | OUTPATIENT
Start: 2020-07-09 | End: 2020-07-09

## 2020-07-09 RX ORDER — PROPOFOL 10 MG/ML
0-50 INJECTION, EMULSION INTRAVENOUS
Status: DISCONTINUED | OUTPATIENT
Start: 2020-07-09 | End: 2020-07-10 | Stop reason: HOSPADM

## 2020-07-09 RX ORDER — PHENOBARBITAL SODIUM 65 MG/ML
1400 INJECTION INTRAMUSCULAR
Status: DISCONTINUED | OUTPATIENT
Start: 2020-07-09 | End: 2020-07-09 | Stop reason: ALTCHOICE

## 2020-07-09 RX ADMIN — FOLIC ACID: 5 INJECTION, SOLUTION INTRAMUSCULAR; INTRAVENOUS; SUBCUTANEOUS at 23:29

## 2020-07-09 RX ADMIN — LORAZEPAM 1 MG: 2 INJECTION INTRAMUSCULAR; INTRAVENOUS at 22:07

## 2020-07-09 RX ADMIN — MIDAZOLAM HYDROCHLORIDE 10 MG: 1 INJECTION, SOLUTION INTRAMUSCULAR; INTRAVENOUS at 22:29

## 2020-07-09 RX ADMIN — MIDAZOLAM 0.5 MG/KG/HR: 5 INJECTION INTRAMUSCULAR; INTRAVENOUS at 23:00

## 2020-07-09 RX ADMIN — SODIUM CHLORIDE 1000 ML: 900 INJECTION, SOLUTION INTRAVENOUS at 20:10

## 2020-07-09 RX ADMIN — PROPOFOL 10 MCG/KG/MIN: 10 INJECTION, EMULSION INTRAVENOUS at 21:50

## 2020-07-09 RX ADMIN — ONDANSETRON 4 MG: 2 INJECTION INTRAMUSCULAR; INTRAVENOUS at 01:00

## 2020-07-09 RX ADMIN — PROPOFOL 1000 MG: 10 INJECTION, EMULSION INTRAVENOUS at 21:43

## 2020-07-09 RX ADMIN — ETOMIDATE 30 MG: 2 INJECTION, SOLUTION INTRAVENOUS at 19:55

## 2020-07-09 RX ADMIN — SODIUM CHLORIDE 1000 ML: 900 INJECTION, SOLUTION INTRAVENOUS at 20:30

## 2020-07-09 RX ADMIN — SODIUM CHLORIDE 125 ML/HR: 900 INJECTION, SOLUTION INTRAVENOUS at 21:56

## 2020-07-09 RX ADMIN — SUCCINYLCHOLINE CHLORIDE 100 MG: 20 INJECTION, SOLUTION INTRAMUSCULAR; INTRAVENOUS at 21:12

## 2020-07-09 RX ADMIN — SODIUM CHLORIDE 3200 MG: 900 INJECTION, SOLUTION INTRAVENOUS at 21:29

## 2020-07-09 RX ADMIN — LORAZEPAM 1 MG: 2 INJECTION INTRAMUSCULAR; INTRAVENOUS at 19:28

## 2020-07-09 RX ADMIN — Medication 80 MCG: at 20:21

## 2020-07-09 RX ADMIN — PROPOFOL 9.96 MCG/KG/MIN: 10 INJECTION, EMULSION INTRAVENOUS at 22:28

## 2020-07-09 RX ADMIN — PROPOFOL 12.56 MCG/KG/MIN: 10 INJECTION, EMULSION INTRAVENOUS at 22:08

## 2020-07-09 RX ADMIN — LORAZEPAM 2 MG: 2 INJECTION INTRAMUSCULAR; INTRAVENOUS at 21:06

## 2020-07-09 RX ADMIN — LEVETIRACETAM 1000 MG: 100 INJECTION, SOLUTION INTRAVENOUS at 19:40

## 2020-07-09 NOTE — ED TRIAGE NOTES
Pt present by EMS from [facility] after staff witnessed what they describe as a grand mal seizure. Pt has Parkinson's and is usually shaking. He denies that he had a seizure and was never postictal but states he does have nausea.  Masked on arrival.

## 2020-07-09 NOTE — ED PROVIDER NOTES
HPI:  80-year-old male diabetes and hypertension was sent in for possible seizure episode. He was shaking. Apparently reported no postictal episode. When asked the patient what had happened he said nothing. Ask if he remember anything he said nothing happened. Ask if he has any pain anywhere. He denies having any seizure activities. He remember everything they were talking about. Reported no hx of seizure     ROS  Constitutional: No fever, no chills  Skin: no rash  Eye: No vision changes  ENMT: No sore throat  Respiratory: No shortness of breath, no cough  Cardiovascular: No chest pain, no palpitations  Gastrointestinal: No vomiting, + nausea, no diarrhea, no abdominal pain  : No dysuria  MSK: No back pain, no muscle pain, no joint pain  Neuro: No headache, no change in mental status, no numbness, no tingling, no weakness  Psych:   Endocrine:   All other review of systems positive per history of present illness and the above otherwise negative or noncontributory. Visit Vitals  /80   Pulse 91   Temp 98.5 °F (36.9 °C)   Resp 18   SpO2 93%     Past Medical History:   Diagnosis Date    Diabetes ([facility] [address]     Hypertension      History reviewed. No pertinent surgical history. Prior to Admission Medications   Prescriptions Last Dose Informant Patient Reported? Taking? LORazepam (ATIVAN) 0.5 mg tablet   Yes No   Sig: Take  by mouth. albuterol (PROVENTIL HFA, VENTOLIN HFA, PROAIR HFA) 90 mcg/actuation inhaler   No No   Sig: Take 2 Puffs by inhalation every six (6) hours as needed for Wheezing. alogliptin (NESINA) 6.25 mg tablet   Yes No   Sig: Take 12.5 mg by mouth. amantadine HCl (SYMMETREL) 100 mg capsule   Yes No   Sig: Take 100 mg by mouth. aspirin 81 mg chewable tablet   Yes No   Sig: Take 81 mg by mouth three (3) times daily as needed for Pain (as needed for pain or fever). atorvastatin (LIPITOR) 40 mg tablet   Yes No   Sig: Take 20 mg by mouth.    diphenhydrAMINE (BENADRYL) 25 mg capsule Yes No   Sig: Take 25 mg by mouth three (3) times daily. divalproex ER (DEPAKOTE ER) 250 mg ER tablet   Yes No   Sig: Take 250 mg by mouth two (2) times a day. glipiZIDE (GLUCOTROL) 10 mg tablet   Yes No   Sig: Take 10 mg by mouth two (2) times a day. levETIRAcetam (KEPPRA) 750 mg tablet   No No   Sig: Take 1 Tab by mouth two (2) times a day. lisinopril (PRINIVIL, ZESTRIL) 10 mg tablet   Yes No   Sig: Take 10 mg by mouth daily. Take 1 half tab every day by mouth   magnesium oxide (MAG-OX) 400 mg tablet   No No   Sig: Take 1 Tab by mouth two (2) times a day. metFORMIN (GLUCOPHAGE) 1,000 mg tablet   Yes No   Sig: Take 1,000 mg by mouth two (2) times a day. polyethylene glycol (MIRALAX) 17 gram packet   Yes No   Sig: Take 17 g by mouth daily. propranolol (INDERAL) 10 mg tablet   Yes No   Sig: Take  by mouth daily. risperiDONE (RISPERDAL M-TAB) 2 mg disintegrating tablet   No No   Si tab every 8 hours as needed for agitation   sAXagliptin (ONGLYZA) 5 mg tab tablet   Yes No   Sig: Take  by mouth daily. tamsulosin (FLOMAX) 0.4 mg capsule   Yes No   Sig: Take 0.4 mg by mouth. Facility-Administered Medications: None         Adult Exam   General: alert, no acute distress  Head: normocephalic, atraumatic  ENT: moist mucous membranes  Neck: supple, non-tender; full range of motion  Cardiovascular: regular rate and rhythm, normal peripheral perfusion, no edema  Respiratory:  normal respirations; no wheezing, rales or rhonchi  Gastrointestinal: soft, non-tender; no rebound or guarding, no peritoneal signs, no distension  Back: non-tender, full range of motion  Musculoskeletal: normal range of motion, normal strength, no gross deformities  Neurological: alert and oriented x 4, no gross focal deficits; normal speech  Psychiatric: cooperative; appropriate mood and affect    MDM:  Is otherwise well-appearing. Exam is benign at this time. Moving all extremities. No signs of tongue biting.   No known history of prior seizure. Will initiate work-up including labs and urine. 7:35 PM  Called to the room. Patient had an episode of unresponsiveness. Oxygenation dropped into the 80% and leaning toward the right. Concern for seizure episode. Witnessed by staff. We will obtain labs. Will place [facility] to check urine. Will obtain chest x-ray, CT scan of the head. Patient was placed on nonrebreather at 15 L now coming back up to 97%. He is breathing however has very minimal gag reflex. 1 of Ativan has already been given. Will observe for the next 10 minutes. If he has no improvement in his mentation or respiratory status will need to intubate for airway management. Reviewed of medical record showing Maryan Durbin and Depakote. Likely for prior hx of seizure. 9:23 PM  Patient has no recurrent seizure activities however after observation for 10 minutes an ABG was obtained with significant acidosis. Could be secondary to hypoxia versus seizure causing metabolic acidosis. Does not have a very prominent gag reflex at this time. Decision was made to intubate for airway protection. No difficulty with intubation under glide scope. In the harman-intubation. Blood pressure did trend down. His initial blood pressure prior to intubation was 801 systolic. A total of 240 mcg Kalin-Synephrine was given along with IV fluid to maintain blood pressure. He was taken to CT emergently. No acute intracranial hemorrhage noted. ET tube is in satisfactory place without any signs of infiltrate on chest x-ray or pneumothorax. Patient unfortunately had another seizure episode roughly 915.  2 additional milligram of Ativan given. A total of 60 mix per cake of Keppra will be given in total for combined amount of 4200 mg. Repeat glucose was 242 post seizure episode. His urine from [facility] appear fairly unremarkable not consistent with acute any acute infectious etiology.   He has significant encephalopathy noted on CT scan of the head.  Patient will be admitted will be transferred to the [facility] for further management for status epilepticus. 10:47 PM  Spoke with intensivist Dr. Megan Dasilva at [address]. He requests that I speak with family member as well as consult to telemetry neurology for any further medication recommendation. I did speak with his Brother Shweta Adan -cell phone [phone]. He requested that we do everything we can give him a chance. I also spoke with tele-neurology. They recommend  A treatment may want to consider versus versus fosphenytoin. Also recommended that we check for any alternative sources such as EtOH related or drug related causing seizure. The patient had another seizure when I was on the phone with the tele-neurologist.  We will give 10 mg of Versed loading dose follow-up with 0.5 mg/kg/hr infusion of Versed for treatment of status epilepticus. Patient will be admitted in transfer to [facility] ICU with accepting physician Dr. Mami Lane Documentation: This patient is critically ill and there is a high probability of of imminent or life threatening deterioration in the patient's condition without immediate management. The nature of the patient's clinical problem is: Status epilepticus, respiratory failure with hypoxia and hypercapnia     I have spent 90 minutes in direct patient care, documentation, review of labs/xrays/old records, discussion with Colleague and staff . The time involved in the performance of separately reportable procedures was not counted toward critical care time. Britney Garcia MD; 7/9/2020 @9:31 PM     Intubation  Procedure Note - Intubation  Performed by: Ashia Burns MD  Immediately prior to the procedure, the patient was reevaluated and found suitable for the planned procedure and any planned medications.   Immediately prior to the procedure a timeout was called to verify the correct patient, procedure, equipment, staff, and markings as appropriate. Indication for procedure: Airway protection with hypoxia     RSI was performed with etomidate and succinylcholine. The patient was orotracheally intubated with a size 7.5 endotracheal tube using a glidescope with indirect visualization. The tube was advanced to 23 centimeters at the teeth. The endotracheal tube location was confirmed by color metrics, auscultation, capnography and x-ray. Number of attempts: 1  Complications: None   The procedure took 15 minutes. Ct Head Wo Cont    Result Date: 7/9/2020  CT HEAD WITHOUT CONTRAST HISTORY:  Seizure. COMPARISON: 9/7/2019 TECHNIQUE: Axial imaging was performed without intravenous contrast utilizing 5mm slice thickness. Sagittal and coronal reformats were performed. Radiation dose reduction techniques were used for this study. Our CT scanner uses one or all of the following: Automated exposure control, adjustment of the MAS or KUB according to patient's size and iterative reconstruction. FINDINGS:    *BRAIN:    -  There are no early signs of territorial or lacunar infarction by CT.    -  Symmetric supratentorial atrophy. -  For patient's age, the scattered areas of white matter hypodensities may represent a chronic small vessel white matter ischemia. However this is nonspecific. *VISUALIZED PARANASAL SINUSES: Well aerated. *MASTOIDS:  Clear. *CALVARIUM AND SCALP: Unremarkable. IMPRESSION: No acute abnormalities. Date of Dictation: 7/9/2020 8:43 PM     Xr Chest Port    Result Date: 7/9/2020  EXAM: Single view chest radiograph. INDICATION: Hypoxia. COMPARISON: Chest radiograph dated September 07, 2019. FINDINGS: Endotracheal tube is in appropriate position. Unchanged right upper lobe discoid scarring versus atelectasis. No pneumothorax or pleural effusion. No new lung consolidation. Heart is normal in size. IMPRESSION: 1. Endotracheal tube is in appropriate position. No new lung consolidation.  Unchanged right upper lobe scarring versus atelectasis. No results found. Recent Results (from the past 24 hour(s))   CBC WITH AUTOMATED DIFF    Collection Time: 07/09/20  6:09 PM   Result Value Ref Range    WBC 11.9 (H) 4.3 - 11.1 K/uL    RBC 3.98 (L) 4.23 - 5.6 M/uL    HGB 12.6 (L) 13.6 - 17.2 g/dL    HCT 38.4 (L) 41.1 - 50.3 %    MCV 96.5 79.6 - 97.8 FL    MCH 31.7 26.1 - 32.9 PG    MCHC 32.8 31.4 - 35.0 g/dL    RDW 13.1 11.9 - 14.6 %    PLATELET 977 219 - 181 K/uL    MPV 10.4 9.4 - 12.3 FL    ABSOLUTE NRBC 0.00 0.0 - 0.2 K/uL    DF AUTOMATED      NEUTROPHILS 77 43 - 78 %    LYMPHOCYTES 15 13 - 44 %    MONOCYTES 7 4.0 - 12.0 %    EOSINOPHILS 2 0.5 - 7.8 %    BASOPHILS 0 0.0 - 2.0 %    IMMATURE GRANULOCYTES 0 0.0 - 5.0 %    ABS. NEUTROPHILS 9.1 (H) 1.7 - 8.2 K/UL    ABS. LYMPHOCYTES 1.7 0.5 - 4.6 K/UL    ABS. MONOCYTES 0.8 0.1 - 1.3 K/UL    ABS. EOSINOPHILS 0.2 0.0 - 0.8 K/UL    ABS. BASOPHILS 0.1 0.0 - 0.2 K/UL    ABS. IMM. GRANS. 0.0 0.0 - 0.5 K/UL   METABOLIC PANEL, COMPREHENSIVE    Collection Time: 07/09/20  6:09 PM   Result Value Ref Range    Sodium 138 136 - 145 mmol/L    Potassium 4.8 3.5 - 5.1 mmol/L    Chloride 107 98 - 107 mmol/L    CO2 21 21 - 32 mmol/L    Anion gap 10 7 - 16 mmol/L    Glucose 218 (H) 65 - 100 mg/dL    BUN 24 (H) 8 - 23 MG/DL    Creatinine 1.27 0.8 - 1.5 MG/DL    GFR est AA >60 >60 ml/min/1.73m2    GFR est non-AA 59 (L) >60 ml/min/1.73m2    Calcium 9.2 8.3 - 10.4 MG/DL    Bilirubin, total 0.4 0.2 - 1.1 MG/DL    ALT (SGPT) 26 12 - 65 U/L    AST (SGOT) 34 15 - 37 U/L    Alk. phosphatase 54 50 - 136 U/L    Protein, total 7.2 6.3 - 8.2 g/dL    Albumin 3.7 3.2 - 4.6 g/dL    Globulin 3.5 2.3 - 3.5 g/dL    A-G Ratio 1.1 (L) 1.2 - 3.5           Dragon voice recognition software was used to create this note. Although the note has been reviewed and corrected where necessary, additional errors may have been overlooked and remain in the text.

## 2020-07-10 ENCOUNTER — APPOINTMENT (OUTPATIENT)
Dept: GENERAL RADIOLOGY | Age: 75
DRG: 100 | End: 2020-07-10
Attending: INTERNAL MEDICINE
Payer: MEDICARE

## 2020-07-10 ENCOUNTER — HOSPITAL ENCOUNTER (INPATIENT)
Age: 75
LOS: 11 days | Discharge: HOSPICE | DRG: 100 | End: 2020-07-21
Attending: INTERNAL MEDICINE | Admitting: INTERNAL MEDICINE
Payer: MEDICARE

## 2020-07-10 VITALS
BODY MASS INDEX: 24.33 KG/M2 | HEIGHT: 67 IN | TEMPERATURE: 97.4 F | WEIGHT: 154.98 LBS | SYSTOLIC BLOOD PRESSURE: 169 MMHG | RESPIRATION RATE: 18 BRPM | OXYGEN SATURATION: 100 % | DIASTOLIC BLOOD PRESSURE: 88 MMHG | HEART RATE: 78 BPM

## 2020-07-10 DIAGNOSIS — G40.901 STATUS EPILEPTICUS (HCC): ICD-10-CM

## 2020-07-10 DIAGNOSIS — I10 ESSENTIAL HYPERTENSION: ICD-10-CM

## 2020-07-10 DIAGNOSIS — R53.81 DEBILITY: ICD-10-CM

## 2020-07-10 DIAGNOSIS — Z71.89 ACP (ADVANCE CARE PLANNING): ICD-10-CM

## 2020-07-10 DIAGNOSIS — R13.10 DYSPHAGIA, UNSPECIFIED TYPE: ICD-10-CM

## 2020-07-10 DIAGNOSIS — Z51.5 ENCOUNTER FOR PALLIATIVE CARE: ICD-10-CM

## 2020-07-10 DIAGNOSIS — J96.01 ACUTE RESPIRATORY FAILURE WITH HYPOXIA (HCC): ICD-10-CM

## 2020-07-10 DIAGNOSIS — E87.20 LACTIC ACID INCREASED: ICD-10-CM

## 2020-07-10 DIAGNOSIS — G20 PARKINSON DISEASE (HCC): ICD-10-CM

## 2020-07-10 DIAGNOSIS — N28.9 ACUTE KIDNEY INSUFFICIENCY: ICD-10-CM

## 2020-07-10 DIAGNOSIS — R41.82 ALTERED MENTAL STATUS, UNSPECIFIED ALTERED MENTAL STATUS TYPE: ICD-10-CM

## 2020-07-10 PROBLEM — E11.9 DM2 (DIABETES MELLITUS, TYPE 2) (HCC): Chronic | Status: ACTIVE | Noted: 2020-07-10

## 2020-07-10 PROBLEM — E11.9 DM2 (DIABETES MELLITUS, TYPE 2) (HCC): Status: ACTIVE | Noted: 2020-07-10

## 2020-07-10 LAB
ALBUMIN SERPL-MCNC: 2.6 G/DL (ref 3.2–4.6)
ALBUMIN/GLOB SERPL: 0.8 {RATIO} (ref 1.2–3.5)
ALP SERPL-CCNC: 46 U/L (ref 50–136)
ALT SERPL-CCNC: 25 U/L (ref 12–65)
ANION GAP SERPL CALC-SCNC: 14 MMOL/L (ref 7–16)
ARTERIAL PATENCY WRIST A: YES
AST SERPL-CCNC: 28 U/L (ref 15–37)
ATRIAL RATE: 88 BPM
BASE DEFICIT BLD-SCNC: 9 MMOL/L
BDY SITE: ABNORMAL
BILIRUB SERPL-MCNC: 0.3 MG/DL (ref 0.2–1.1)
BUN SERPL-MCNC: 19 MG/DL (ref 8–23)
CALCIUM SERPL-MCNC: 6.8 MG/DL (ref 8.3–10.4)
CALCULATED P AXIS, ECG09: 75 DEGREES
CALCULATED R AXIS, ECG10: 74 DEGREES
CALCULATED T AXIS, ECG11: -22 DEGREES
CHLORIDE SERPL-SCNC: 120 MMOL/L (ref 98–107)
CO2 BLD-SCNC: 19 MMOL/L
CO2 SERPL-SCNC: 14 MMOL/L (ref 21–32)
COLLECT TIME,HTIME: 300
CREAT SERPL-MCNC: 0.89 MG/DL (ref 0.8–1.5)
DIAGNOSIS, 93000: NORMAL
ERYTHROCYTE [DISTWIDTH] IN BLOOD BY AUTOMATED COUNT: 13.2 % (ref 11.9–14.6)
EXHALED MINUTE VOLUME, VE: 10.3 L/MIN
GAS FLOW.O2 O2 DELIVERY SYS: ABNORMAL L/MIN
GAS FLOW.O2 SETTING OXYMISER: 18 BPM
GLOBULIN SER CALC-MCNC: 3.2 G/DL (ref 2.3–3.5)
GLUCOSE BLD STRIP.AUTO-MCNC: 138 MG/DL (ref 65–100)
GLUCOSE BLD STRIP.AUTO-MCNC: 160 MG/DL (ref 65–100)
GLUCOSE BLD STRIP.AUTO-MCNC: 166 MG/DL (ref 65–100)
GLUCOSE BLD STRIP.AUTO-MCNC: 169 MG/DL (ref 65–100)
GLUCOSE SERPL-MCNC: 171 MG/DL (ref 65–100)
HCO3 BLD-SCNC: 17.4 MMOL/L (ref 22–26)
HCT VFR BLD AUTO: 39.8 % (ref 41.1–50.3)
HGB BLD-MCNC: 12.7 G/DL (ref 13.6–17.2)
INR PPP: 1
INSPIRATION.DURATION SETTING TIME VENT: 0.85 SEC
LACTATE SERPL-SCNC: 2.7 MMOL/L (ref 0.4–2)
MAGNESIUM SERPL-MCNC: 1.7 MG/DL (ref 1.8–2.4)
MCH RBC QN AUTO: 32.2 PG (ref 26.1–32.9)
MCHC RBC AUTO-ENTMCNC: 31.9 G/DL (ref 31.4–35)
MCV RBC AUTO: 101 FL (ref 79.6–97.8)
NRBC # BLD: 0 K/UL (ref 0–0.2)
O2/TOTAL GAS SETTING VFR VENT: 50 %
P-R INTERVAL, ECG05: 174 MS
PCO2 BLD: 37.1 MMHG (ref 35–45)
PEEP RESPIRATORY: 8 CMH2O
PH BLD: 7.28 [PH] (ref 7.35–7.45)
PHOSPHATE SERPL-MCNC: 2.1 MG/DL (ref 2.3–3.7)
PLATELET # BLD AUTO: 172 K/UL (ref 150–450)
PMV BLD AUTO: 10.1 FL (ref 9.4–12.3)
PO2 BLD: 159 MMHG (ref 75–100)
POTASSIUM SERPL-SCNC: 3.8 MMOL/L (ref 3.5–5.1)
PROT SERPL-MCNC: 5.8 G/DL (ref 6.3–8.2)
PROTHROMBIN TIME: 13.8 SEC (ref 12–14.7)
Q-T INTERVAL, ECG07: 388 MS
QRS DURATION, ECG06: 90 MS
QTC CALCULATION (BEZET), ECG08: 469 MS
RBC # BLD AUTO: 3.94 M/UL (ref 4.23–5.6)
SAO2 % BLD: 99 % (ref 95–98)
SERVICE CMNT-IMP: ABNORMAL
SODIUM SERPL-SCNC: 148 MMOL/L (ref 136–145)
SPECIMEN TYPE: ABNORMAL
VALPROATE SERPL-MCNC: 29 UG/ML (ref 50–100)
VENTILATION MODE VENT: ABNORMAL
VENTRICULAR RATE, ECG03: 88 BPM
VT SETTING VENT: 450 ML
WBC # BLD AUTO: 14.6 K/UL (ref 4.3–11.1)

## 2020-07-10 PROCEDURE — 95714 VEEG EA 12-26 HR UNMNTR: CPT | Performed by: INTERNAL MEDICINE

## 2020-07-10 PROCEDURE — 80053 COMPREHEN METABOLIC PANEL: CPT

## 2020-07-10 PROCEDURE — 74011250636 HC RX REV CODE- 250/636: Performed by: PSYCHIATRY & NEUROLOGY

## 2020-07-10 PROCEDURE — 74011000250 HC RX REV CODE- 250: Performed by: INTERNAL MEDICINE

## 2020-07-10 PROCEDURE — 85610 PROTHROMBIN TIME: CPT

## 2020-07-10 PROCEDURE — 82962 GLUCOSE BLOOD TEST: CPT

## 2020-07-10 PROCEDURE — 36415 COLL VENOUS BLD VENIPUNCTURE: CPT

## 2020-07-10 PROCEDURE — 99291 CRITICAL CARE FIRST HOUR: CPT | Performed by: INTERNAL MEDICINE

## 2020-07-10 PROCEDURE — 82803 BLOOD GASES ANY COMBINATION: CPT

## 2020-07-10 PROCEDURE — 83735 ASSAY OF MAGNESIUM: CPT

## 2020-07-10 PROCEDURE — 74018 RADEX ABDOMEN 1 VIEW: CPT

## 2020-07-10 PROCEDURE — 94003 VENT MGMT INPAT SUBQ DAY: CPT

## 2020-07-10 PROCEDURE — 74011000258 HC RX REV CODE- 258: Performed by: INTERNAL MEDICINE

## 2020-07-10 PROCEDURE — 74011250637 HC RX REV CODE- 250/637: Performed by: INTERNAL MEDICINE

## 2020-07-10 PROCEDURE — 65610000001 HC ROOM ICU GENERAL

## 2020-07-10 PROCEDURE — 99222 1ST HOSP IP/OBS MODERATE 55: CPT | Performed by: INTERNAL MEDICINE

## 2020-07-10 PROCEDURE — 74011250636 HC RX REV CODE- 250/636: Performed by: INTERNAL MEDICINE

## 2020-07-10 PROCEDURE — 99222 1ST HOSP IP/OBS MODERATE 55: CPT | Performed by: PSYCHIATRY & NEUROLOGY

## 2020-07-10 PROCEDURE — C9113 INJ PANTOPRAZOLE SODIUM, VIA: HCPCS | Performed by: INTERNAL MEDICINE

## 2020-07-10 PROCEDURE — 80165 DIPROPYLACETIC ACID FREE: CPT

## 2020-07-10 PROCEDURE — 83605 ASSAY OF LACTIC ACID: CPT

## 2020-07-10 PROCEDURE — 74011000258 HC RX REV CODE- 258: Performed by: PSYCHIATRY & NEUROLOGY

## 2020-07-10 PROCEDURE — 80164 ASSAY DIPROPYLACETIC ACD TOT: CPT

## 2020-07-10 PROCEDURE — 5A1945Z RESPIRATORY VENTILATION, 24-96 CONSECUTIVE HOURS: ICD-10-PCS | Performed by: INTERNAL MEDICINE

## 2020-07-10 PROCEDURE — 71045 X-RAY EXAM CHEST 1 VIEW: CPT

## 2020-07-10 PROCEDURE — 74011636637 HC RX REV CODE- 636/637: Performed by: INTERNAL MEDICINE

## 2020-07-10 PROCEDURE — 36600 WITHDRAWAL OF ARTERIAL BLOOD: CPT

## 2020-07-10 PROCEDURE — 94002 VENT MGMT INPAT INIT DAY: CPT

## 2020-07-10 PROCEDURE — 84100 ASSAY OF PHOSPHORUS: CPT

## 2020-07-10 PROCEDURE — 74011000250 HC RX REV CODE- 250: Performed by: PSYCHIATRY & NEUROLOGY

## 2020-07-10 PROCEDURE — 76450000000

## 2020-07-10 PROCEDURE — 80185 ASSAY OF PHENYTOIN TOTAL: CPT

## 2020-07-10 PROCEDURE — 85027 COMPLETE CBC AUTOMATED: CPT

## 2020-07-10 RX ORDER — HYDRALAZINE HYDROCHLORIDE 20 MG/ML
10 INJECTION INTRAMUSCULAR; INTRAVENOUS
Status: DISCONTINUED | OUTPATIENT
Start: 2020-07-10 | End: 2020-07-21 | Stop reason: HOSPADM

## 2020-07-10 RX ORDER — ATORVASTATIN CALCIUM 40 MG/1
40 TABLET, FILM COATED ORAL
Status: DISCONTINUED | OUTPATIENT
Start: 2020-07-10 | End: 2020-07-11

## 2020-07-10 RX ORDER — PROPOFOL 10 MG/ML
0-50 VIAL (ML) INTRAVENOUS
Status: DISCONTINUED | OUTPATIENT
Start: 2020-07-10 | End: 2020-07-12

## 2020-07-10 RX ORDER — TAMSULOSIN HYDROCHLORIDE 0.4 MG/1
0.4 CAPSULE ORAL DAILY
Status: DISCONTINUED | OUTPATIENT
Start: 2020-07-10 | End: 2020-07-21 | Stop reason: HOSPADM

## 2020-07-10 RX ORDER — AMANTADINE HYDROCHLORIDE 100 MG/1
100 CAPSULE, GELATIN COATED ORAL DAILY
Status: DISCONTINUED | OUTPATIENT
Start: 2020-07-10 | End: 2020-07-11

## 2020-07-10 RX ORDER — LISINOPRIL 5 MG/1
10 TABLET ORAL DAILY
Status: DISCONTINUED | OUTPATIENT
Start: 2020-07-10 | End: 2020-07-11

## 2020-07-10 RX ORDER — HEPARIN SODIUM 5000 [USP'U]/ML
5000 INJECTION, SOLUTION INTRAVENOUS; SUBCUTANEOUS EVERY 8 HOURS
Status: DISCONTINUED | OUTPATIENT
Start: 2020-07-10 | End: 2020-07-17

## 2020-07-10 RX ORDER — MIDAZOLAM IN 0.9 % SOD.CHLORID 1 MG/ML
0-10 PLASTIC BAG, INJECTION (ML) INTRAVENOUS CONTINUOUS
Status: DISCONTINUED | OUTPATIENT
Start: 2020-07-10 | End: 2020-07-12

## 2020-07-10 RX ORDER — PROPOFOL 10 MG/ML
0-50 INJECTION, EMULSION INTRAVENOUS
Status: DISCONTINUED | OUTPATIENT
Start: 2020-07-10 | End: 2020-07-10

## 2020-07-10 RX ORDER — SODIUM CHLORIDE 0.9 % (FLUSH) 0.9 %
5-40 SYRINGE (ML) INJECTION EVERY 8 HOURS
Status: DISCONTINUED | OUTPATIENT
Start: 2020-07-10 | End: 2020-07-21 | Stop reason: HOSPADM

## 2020-07-10 RX ORDER — POLYETHYLENE GLYCOL 3350 17 G/17G
17 POWDER, FOR SOLUTION ORAL DAILY
Status: DISCONTINUED | OUTPATIENT
Start: 2020-07-10 | End: 2020-07-11

## 2020-07-10 RX ORDER — DEXTROSE MONOHYDRATE 50 MG/ML
50 INJECTION, SOLUTION INTRAVENOUS CONTINUOUS
Status: DISCONTINUED | OUTPATIENT
Start: 2020-07-10 | End: 2020-07-12

## 2020-07-10 RX ORDER — MAGNESIUM SULFATE HEPTAHYDRATE 40 MG/ML
2 INJECTION, SOLUTION INTRAVENOUS ONCE
Status: COMPLETED | OUTPATIENT
Start: 2020-07-10 | End: 2020-07-10

## 2020-07-10 RX ORDER — SODIUM CHLORIDE 0.9 % (FLUSH) 0.9 %
5-40 SYRINGE (ML) INJECTION AS NEEDED
Status: DISCONTINUED | OUTPATIENT
Start: 2020-07-10 | End: 2020-07-21 | Stop reason: HOSPADM

## 2020-07-10 RX ORDER — PROPRANOLOL HYDROCHLORIDE 10 MG/1
10 TABLET ORAL DAILY
Status: DISCONTINUED | OUTPATIENT
Start: 2020-07-10 | End: 2020-07-11

## 2020-07-10 RX ADMIN — ATORVASTATIN CALCIUM 40 MG: 40 TABLET, FILM COATED ORAL at 22:38

## 2020-07-10 RX ADMIN — AMANTADINE HYDROCHLORIDE 100 MG: 100 CAPSULE ORAL at 10:38

## 2020-07-10 RX ADMIN — HEPARIN SODIUM 5000 UNITS: 5000 INJECTION INTRAVENOUS; SUBCUTANEOUS at 06:29

## 2020-07-10 RX ADMIN — Medication 10 ML: at 22:38

## 2020-07-10 RX ADMIN — HUMAN INSULIN 2 UNITS: 100 INJECTION, SOLUTION SUBCUTANEOUS at 02:12

## 2020-07-10 RX ADMIN — HEPARIN SODIUM 5000 UNITS: 5000 INJECTION INTRAVENOUS; SUBCUTANEOUS at 15:07

## 2020-07-10 RX ADMIN — VALPROATE SODIUM 500 MG: 100 INJECTION, SOLUTION INTRAVENOUS at 11:00

## 2020-07-10 RX ADMIN — Medication 10 ML: at 02:13

## 2020-07-10 RX ADMIN — MAGNESIUM SULFATE HEPTAHYDRATE 2 G: 40 INJECTION, SOLUTION INTRAVENOUS at 10:37

## 2020-07-10 RX ADMIN — POTASSIUM PHOSPHATE, MONOBASIC AND POTASSIUM PHOSPHATE, DIBASIC: 224; 236 INJECTION, SOLUTION, CONCENTRATE INTRAVENOUS at 11:20

## 2020-07-10 RX ADMIN — FOSPHENYTOIN 100 MG PE: 50 INJECTION INTRAMUSCULAR; INTRAVENOUS at 22:10

## 2020-07-10 RX ADMIN — SODIUM CHLORIDE 8 MG/HR: 900 INJECTION, SOLUTION INTRAVENOUS at 03:05

## 2020-07-10 RX ADMIN — HEPARIN SODIUM 5000 UNITS: 5000 INJECTION INTRAVENOUS; SUBCUTANEOUS at 22:37

## 2020-07-10 RX ADMIN — HUMAN INSULIN 2 UNITS: 100 INJECTION, SOLUTION SUBCUTANEOUS at 18:00

## 2020-07-10 RX ADMIN — SODIUM CHLORIDE 40 MG: 9 INJECTION, SOLUTION INTRAMUSCULAR; INTRAVENOUS; SUBCUTANEOUS at 10:19

## 2020-07-10 RX ADMIN — HYDRALAZINE HYDROCHLORIDE 10 MG: 20 INJECTION INTRAMUSCULAR; INTRAVENOUS at 03:20

## 2020-07-10 RX ADMIN — FOSPHENYTOIN 100 MG PE: 50 INJECTION INTRAMUSCULAR; INTRAVENOUS at 11:16

## 2020-07-10 RX ADMIN — VALPROATE SODIUM 500 MG: 100 INJECTION, SOLUTION INTRAVENOUS at 22:37

## 2020-07-10 RX ADMIN — DEXTROSE MONOHYDRATE 50 ML/HR: 5 INJECTION, SOLUTION INTRAVENOUS at 10:40

## 2020-07-10 RX ADMIN — PROPOFOL INJECTABLE EMULSION 10 MCG/KG/MIN: 10 INJECTION, EMULSION INTRAVENOUS at 20:25

## 2020-07-10 RX ADMIN — VALPROATE SODIUM 500 MG: 100 INJECTION, SOLUTION INTRAVENOUS at 17:21

## 2020-07-10 RX ADMIN — Medication 10 ML: at 06:29

## 2020-07-10 RX ADMIN — FOSPHENYTOIN 100 MG PE: 50 INJECTION INTRAMUSCULAR; INTRAVENOUS at 17:21

## 2020-07-10 RX ADMIN — Medication 10 ML: at 17:21

## 2020-07-10 RX ADMIN — HUMAN INSULIN 2 UNITS: 100 INJECTION, SOLUTION SUBCUTANEOUS at 06:33

## 2020-07-10 NOTE — PROGRESS NOTES
Initial visit made to patient and a prayer was provided. A  card was left. He is intubated.         YESICA Toribio

## 2020-07-10 NOTE — ROUTINE PROCESS
Guideline     Guideline Number: [id number]     Title: Management of the Patient with Mechanical Ventilation (including weaning) and ABCDE Bundle    Effective Date:  03/00    Revised Date: 02/09, 03/10, 7/12, 5/13,                                  10/13, 8/14  Reviewed Date: 07/2015, 04/2016, 06/2017       I. Policy:  Management of the patient requiring mechanical ventilation, including readiness to wean and weaning protocol. The information provided serves as a guideline for patient management. Included in this guidelines is the ABCDE Bundle to provide guidance to staff for evidence based management of pain, agitation/anxiety and delirium in the intensive care unit. The goals of critical care analgesia and sedation are to facilitate mechanical ventilation, to prevent patient and caregiver injury, and to avoid the psychological and physiologic consequences of inadequate treatment of pain, anxiety, agitation, and delirium by maintaining a light level of sedation. Pain occurs commonly in adult ICU patients, regardless of admitting diagnosis. Therefore, pain should be frequently assessed and analgesic medications titrated to prevent adverse effects associated with either inadequate or excessive analgesia. Once pain has been addressed, anxiolytic and/or antipsychotic medications can be utilized to treat unresolved agitation/anxiety and delirium, with the goal to prevent over- or under sedation by using the Phillips Agitation Sedation Scale (RASS). Assertive management of these issues has been shown to reduce costs, improve ICU outcomes such as successful extubation and ICU length of stay, and allow for patients to participate in their own care. II. Purpose: The respiratory care practitioner and the critical care RN will utilize the following guideline to provide the most efficient and effective management of mechanical ventilators and weaning and extubation processes.     Goals of Treatment:  1. Adequate management of patients pain and discomfort while maintaining a light level of                   sedation (RASS score of 0 to -1)  2. Both chronic and acute sources of pain should be identified and treated. 3. Sedative agents should be considered if patient still expresses discomfort and/or is not at RASS         goal of 0 to -1 despite adequate management of pain. 4. Patients requiring neuromuscular blockage must have continuous infusions of analgesic and              sedative agents. III. Responsibility: Director Respiratory Care Services and all Respiratory Care Practitioners  with documented competency as well as Critical Care RN staff. General Guidelines    1. Introduction to Ventilator Plan Phase  a. Ventilator Management Phase, General Statement  -  The plan should be initiated in patients who have a secure airway/require invasive mechanical ventilation (endotracheal or tracheostomy) only  -   The provider determines the appropriate medications used for analgesia and agitation/anxiety along with the clinical pharmacist    2. Monitoring Levels of Comfort  a. Pain Assessment  - Pain is monitored using the numerical scales  - A pain assessment should be conducted, at a minimum, every 4 hours and as needed and per guidelines. - The level of pain should be determined as satisfactory by the patient based on patients baseline level of pain , considering any chronic pain that the patient may have. - If the patient is unable to communicate pain level, the nurse can assess for nonverbal indicators including facial grimacing, moaning, tachypnea, tachycardia, hypertension, diaphoresis, etc as a cue to begin further pain assessment.             b.  Sedation Assessment  - Sedation is monitored using the Phillips Agitation Sedation Scale (RASS)  Target RASS RASS Description   +4 Combative, violent, danger to staff     +3 Pulls or removes tubes(s) or catheters; aggressive   +2 Frequent nonpurposeful movement, fights ventilator   +1 Anxious, apprehensive, but not aggressive   0 Alert and calm   -1 Awakens to voice (eye opening/contact) > 10 sec   -2 Light sedation, briefly awakens to voice (eye opening/contact) < 10 sec   -3 Moderate sedation, movement or eye opening. No eye contact   -4 Deep sedation, no response to voice, but movement or eye opening to physical stimulation   -5 Unarousable, no response to voice or physical stimulation     -  Goal RASS is 0 to -1, unless otherwise specified by providers order.  - Nursing staff should conduct the RASS every 4 hours and as needed to maintain goal RASS of 0 to -1.  - If RASS is outside of goal range, discuss treatment options with provider.  - A RASS score of +2 to +4 requires further assessment by the nurse. Causes of agitation/anxiety that should be considered include:  a. Pulmonary -   endotracheal tube malposition or patency, mode of ventilation, pneumothorax, hypoxemia, hypercarbia  b. Metabolic - hypoglycemia, hyponatremia, acute renal or hepatic failure  c. Emotional upset - with information and awareness of critical condition, prognosis, need for surgical or invasive procedures, other interventions or complications, family or personal stressors  - C. Sedation Assessment while using Neuromusclar Blocking Agents  - D. Delirium Assessment  a. the ICU ([facility] - ICU)   3. Analgesia  The incidence of significant pain has been reported to be 50% or higher in both medical and surgical ICU patients. These patients also experience discomfort during routine/procedural ICU care and at rest.  However, patients may be unable to self-report their pain (either verbally or with other signs) because of an altered level of consciousness, the use of mechanical ventilation, or high doses of sedative agents or neuromuscular blocking agents.   The short and long term consequences of unrelieved or inadequately treated pain are significant and include patient discomfort, decreased satisfaction with care by family and patient, delirium, agitation/anxiety, post traumatic stress disorder and depression. Therefore, routine assessment and treatment of pain should occur in all ICU patients. Causes and Treatment of Pain in the ICU  a. Acute pain (post-operative, procedural pain, discomfort with usual ICU care or other acute episodes of pain-related to underlying disease)  1. Consider use of PCA for alert and oriented patients with pain needs not met by PRN dosing or opoids. 2. Preemptive analgesia should occur prior to chest tube removal, and should be considered for other procedural pain such as turning and repositioning, would drain removal, wound dressing change, tracheal suctioning, femoral catheter removal or place of central venous catheter. 3. Appropriate analgesic medications for preemptive analgesia are short acting intravenous (IV) agents (i.e. fentanyl, morphine, hydromorphone)  a. Administration of analgesia before patient experiences noxious stimuli prevents amplification and hyperexcitability of the central nervous system. b. Analgesia for Mechanically Ventilated Patients:  1. The approach to sedation and analgesia management for mechanically ventilated patients favors use of analgesia first sedation. The primary goal of this strategy is to address pain and discomfort first, and then if necessary, add anxiolytic agent. 2. Analgesia first sedation reduces dose escalation of medications, decreases the duration of mechanical ventilation and the incidence of VAP, improves the probability of successful extubation, and ultimately shortens ICU length of stay. 3. For pain management, analgesic medications are determined by the provider. Intermittent dosing of the analgesic should be attempted first.    If the patient requires more than 3 doses within 1 hour then provider should be contacted to consider continuous infusion.   4.  Analgesic options for mechanically ventilated patients include:  a. Fentanyl which is considered the drug of choice for patients requiring continuous infusion. b.Morphine may be considered for those patients without renal dysfunction who are hemodynamically stable and require intermittent pain medication. Continuous infusions of morphine may be used for patientl who are receiving comfort care as part of end of life care. c.Hydromorphone is reserved for patients who are refractory to fentanyl or morphine and is typically admininstered by intermittent dosing. 4.  Agitation/Anxiety    Background  Agitation and anxiety frequently occur in ICU patients. Anxiolytic/sedation agents may be indicated to help relieve discomfort, improve synchrony with mechanical ventilation, and decrease the overall work of breathing. Pain control alone may be sufficient to make patients comfortable enough to require no anxiolytic/sedative agent. In addition, non-pharmacologic interventions such as repositioning or verbal assurance may be helpful to comfort or redirect an agitated patient. If these methods are unsuccessful, then anxiolytic/sedative medications such as propofol, dexmedetomidine, or benzodiazepines can be used. Selection of an anxiolytic should be based on the pharmacokinetic properties of the medication, patient specific characteristics, and sedation goal.   However, nonbenzodiazepine sedatives (ie propofol or dexmedetomidine) may be preferable over benzodiazepines (ie midazolam or larazepam) due to more favorable outcomes such as delirum. Causes and Treatment of Agitation/Anxiety  a. Possible underlying causes of agitation and anxiety include pain, delirium, hypoxemia, hypoglycemia, hypotension, or withdrawal from alcohol  and other drugs. b. Analgesia first sedation should be attempted initially to manage pain and provide sedation in appropriate patients. Analgesia alone may be adequate to reach RASS goal of 0 to -1.   If patient remains agitated or anxious despite adequate analgesia (ie RASS +2 to +4) then anxiolytic/sedative should be considered. c. The choice of anxiolytic should be based on desired levels of sedation (ie light sedation or deep sedation) with preference for the use of nonbenzodiazepines such as propofol or dexmedetomidine if appropriate. While light sedation (ie RASS 0 to -1) is preferred for most patients, there are instances when deep sedation (ie RASS -4 to -5) is desired. For example, in the setting of ventilator dysynchrony due to ARDS or for patients receiving NMB agents. d. Medications to maintain light sedation (ie RASS 0 to -1) include  1. Propofol continuous infusion can be considered for hemodynamically stable (ie SBP = 100, MAP = 65 and/or not requiring vasopressor support) patients requiring light sedation. Propofol has a quick onset (1-2 minutes) and offset of action, making it a good agent to assess neurological status and facilitate liberation from the mechanical ventilator. 2.Dexmedetomidine continuous infusion is a good option for hemodynamically stable patients requiring light sedation as it allows for a more awake, interactive patient is associated with less delirium. It has an intermediate onset of action (5-10 min). Therefore, abrupt titrations should be avoided, but use of prn haloperidol or benzodiazepine may be useful to manage agitation until the medication takes effect. 3. Antipsychotics are another option. In particular, haloperidol intermittently dosed may be useful for patients with symptoms of agitation/anxiety and delirium. 4.Benzodiazapines can also be considered for light sedation, but should be intermittently doses. Midazolam is an option for patients without renal dysfunction. It has a short onset of action (2-5 minutes) making it a good agent for acute agitation/anxiety, but short duration of action resulting in frequent dosing. Lorazepam is another option.   It has a longer onset of action (15-20 minutes) in comparison to midazolam, but longer duration of action. e. Medications to maintain deep sedation (RASS -4 to -5) include:  1) Propofol continuous infusion should be considered as a first line option for hemodynamically stable patients. 2)Benzodiazepines can be considered as second line options for deep sedation. Studies comparing these agents to other sedatives have shown that they lead to worse outcomes including delirium, oversedation, delayed extubation, and longer time to discharge. Midazolam is one option for patients without renal dysfunction and lorazepam is another options. If patient requires more than 3 doses within 1 hour then contact provider  to consider initiation of continuous infusion. 5.  Daily Sedation Awakening Trial (SAT) from IV Continuous Analgesia/Sedation  a. Patients are to have daily awakening from sedation while on continuous IV analgesia and/or sedation in the ICU. Continuous analgesia infusions may be maintained only if needed for active pain and RASS is at goal 0 - -1. Unit guideline is for the SAT to occur following ICP rounds each morning.    b. The sedation awakening trial (SAT) is done regardless if the patient meets criteria for spontaneous breathing trial (SBT). c. SAT safety screen is assessed and SAT should not be performed if sedation is being used for active seizures, alcohol withdrawal, hemodynamically unstable or requiring support of vasoactive medications , in conjunction with NMB agents, if ICP is greater than  20mmHg or if sedation is being used to control ICP, patients RASS is +3 or +4 (very agitated or combative). Other exclusion criteria are:  if there is documentation of myocardial ischemia in the past 24 hours; or patient is receiving high frequency oscillator ventilation (HFOV) , if the patient has an open chest /abdomen or is receiving comfort care.   d. Criteria for passing the SAT are the patient opened their eyes to verbal stimuli or tolerated sedative interruption without exhibiting failure criteria.  e. Patients fail the SAT if the develop sustained anxiety, agitation, or pain; a respiratory rate of 35 per minutes for 5 minutes or longer, an SpO2 less than 88% for 5 minutes or longer; an acute cardiac dysrhythmia; two or more signs of respiratory distress including tachycardia, bradycardia; use of accessory muscles; diaphoresis; marked dyspnea; or myocardial ischemia. f. Respiratory therapy staff must verify with the nurse that continuous IV analgesia (unless being use  for active pain) and sedation (unless patient is receiving dexmedetomidine) is off prior to placing patient on SBT. g. DO NOT interrupt infusion of analgesia and sedation medications if patient is receiving neuromuscular blockade.  h. Monitor level of wakefulness unless patient is awake and follows commands (RASS 0 to -1) or patient becomes uncomfortable or agitated (RASS +3 to +4)  i. If agitation prevents successful awakening , administer bolus of analgesia and/or sedation then resume infusion of the medication at ½ previous dose and titrate as needed.  j. If oversedation prevents successful awakening, hole infusion until at goal and resume ½ of prior infusion rate/dose if clinically indicated. 6. Delirium  Background  Delirium is characterized by the acute onset of cerebral dysfunction with a change or fluctuation baseline  mental status, inattention, and either disorganized thinking or an altered level of consciousness. It affects up to 80% of mechanically ventilated adult ICU patients, and is associated with increased mortality,   and treatment is important and may in turn allow for a patient to be conscious yet cooperative enough to participate   in ventilator weaning trials and early mobilization efforts.     Delirium can only be assessed in patients who are able to sufficiently interact and communicate with bedside  clinicians (ie RASS -3 to +4). IV. Procedure:  A. Assessment: The following criteria must be assessed prior to the initiation of weaning from mechanical ventilation. Note: The criteria are general guidelines and must be individualized for each patient. The patients primary nurse will be responsible, in coordination with the RT, the Spontaneous Awakening Trial). The RT will perform the SBT. B. Spontaneous Awakening Trials (SATs - also referred to as Sedation Vacation) and Spontaneous Breathing Trials (SBTs) performed to determine readiness to wean. 1. For patients who meet established criteria, such as those without active seizures, alcohol withdrawal and agitation, myocardial ischemia or those requiring cardiac support devices, without increased intracranial pressure and those not receiving neuromuscular blockade, the nurse will reduce the infusions of sedative by 50% of current used for sedation that was used to achieve a level of light sedation (Mack Score 2 or RASS score of 0 to -1) and evaluate patient response to reduction of sedation. Analgesics required for pain control are continued during the test.  Obtain MD order to cover no SAT for that time period if patient has any exclusion criteria as described above. 2. Failure of the spontaneous awakening trial occurs when the patient shows symptoms such as increased agitation, anxiety, pain or signs of respiratory distress including respiratory rate >35/min or oxygen saturation <88% as well as development of acute cardiac arrhythmias. If these symptoms develop during the SAT, the nurse then restarts sedation at 75% of the previous dose and titrates the medications until the patient is comfortable and/or symptoms have abated. 3.  If the patient passes the SAT then the patient moves on to the Spontaneous Breathing Trials as performed by the RT.    The SBT Safety Screen included the following:  No agitation, oxygen saturation > 88%, FIO2 < 50%, PEEP < 7.5 cm H20, no myocardial ischemia, no vasopressor use, and with inspiratory efforts. 4. Patients who pass the spontaneous awakening trial but fail the spontaneous breathing trial are placed back on full ventilator support and reassessed the next day. 5. Failure of the SBT (spontaneous breathing trail) includes any of the following:  Respiratory rate > 35/min, respiratory rate < 8/min, oxygen saturation < 88%, respiratory distress, mental status change, acute cardiac arrhythmia. 6. Extubation is considered for patients who tolerate the spontaneous awakening trial and pass the spontaneous breathing trial.    C. Can the cause of respiratory failure be reversed (i.e. absence of high spinal cord injury or advanced ALS)? D. Is gas exchange adequate? 1. PaO2/FIO2 ratio > 150 - 200,  2. PEEP < 8 cm H20  3. FIO2 < 50  4. pH > 7.30  5. Rapid shallow breathing index (f/VT) < 105  E. Is patient hemodynamically stable? 1. Absence of clinically significant hypotension (minimal vasopressors such as Dopamine < 5mcg/kg/minute)? F. Is there evidence of intact respiratory drive (NIP/NIF >-25 [id number], stable VC02)? G. Does patient have an adequate cough, airway clearance ability? H. Is there absence of excessive secretions? V. Initiation:     A. The therapist shall consult with RN to determine if sedation can be discontinued or significantly decreased. If this can be achieved, the therapist shall implement the                     followin. Identify patient and verify name and account number via ID [email or web address]. 2. Perform hand hygiene per hospital policy utilizing Standard Precautions for all patients and following transmission-based isolation as indicated per hospital policy. 3. Perform a ONE-MINUTE SPONTANEOUS TRIAL AND ASSESSMENT. 4. Measure Rapid Shallow Breathing Index (RSBI) and monitor SpO2 and cardiovascular parameters during the spontaneous breathing assessment.   5. If SpO2 and cardiovascular parameters are stable, continue spontaneous breathing trial for at least 30 minutes and up to 120 minutes, as patient tolerates. 6. Monitor ventilatory status, SpO2, and cardiovascular status during spontaneous breathing trial.  7. If patient has a successful trial, consider patient as a candidate for extubation and obtain order. 8. If patient fails the weaning trial, place back on ventilator and adjust settings to provide a non-fatiguing form of ventilatory support for the remainder of the day and night. 9. One attempt at weaning shall be performed each day until successful weaning occurs. The RCP will make every attempt to begin the spontaneous breathing trials between 0500 and 0600 to provide documentation of the trial when the pulmonologist makes rounds. B.  Assessment of SBT or PST:  1. Is gas exchange acceptable? 2. PaO2 > 60 mm Hg. 3. PH > 7.30  4. Increase in PaCO2  < 10 mm Hg  C. Is patient hemodynamically stable? 1. HR < 120 beats/minute  2. HR  < 20%   3. Systolic BP < 813 and > 90 mmHg  4. BP  < 20%, no vasopressors required  D. Does patient have stable ventilatory pattern? 1. Sustained RR < 30 breaths per minute  2. Normal and stable VCO2  3. Patient is not demonstrating any signs of increased work of breathing, such as increased use of accessory muscles. E. Mental status stable throughout trial?  1. Absence of changes such as somnolence, excessive agitation or anxiety  2. Absence of diaphoresis during trial?  IV. Safety:    A. The RCP shall monitor patient according to the above guidelines. If at any time during the weaning process, the respiratory therapist or nurse feels that the patient is not tolerating weaning, the therapist shall place patient back on previous ventilator settings. B. The patient shall be reassessed and the weaning process should be continued the following day. V. Reportable Conditions:    A.  The therapist shall notify the physician, as appropriate, for any of the following conditions:  1. FIO2 increase (sustained) at 10% or greater  2. Poor patient/ventilator interface in spite of adjustments  3. Need for increased sedation for respiratory distress  4. Need for increasing ventilating pressures (i.e. PEEP, PIP, MAP)  5. ABG results meeting panic value criteria or other clinical signs indicating deterioration of patients condition. 6. Unplanned extubation. 7. Unexplained sustained increase in PIP greater than 10 cm H2O.  8. Assessment results regarding ventilator discontinuance process. VI. Ventilator protocol management   A. The following items should be maintained for patients who are being mechanically           ventilated. 1. Obtain STAT Chest X-Ray for ET tube placement after insertion. 2. Chest X-Ray q a.m. while on ventilator. 3. ABGs 30 - 60 minutes after being stable on the ventilator. 4. ABG's q a.m. while on ventilator and prn.  5. Do spontaneous breathing trials when patient is hemodynamically stable, responsive, and without fever. 6. Terminate trials if patient exhibits signs of respiratory distress. 7. Therapists should maintain ABG s as follows:      a. pH -  7.30 - 7.50                   b. PaO2 -   60 - 100        8. Racemic Epinephrine (0.5cc) for post extubation stridor (2 UA treatments max.)    VII.   Early Mobilization    Mobility Level Criteria Start at Level 1 if:   PaO2/FIO2 <250   Positive end-expiratory Pressure (PEEP) >=10 cm H2O   O2 saturation <90%   Respiratory Rate (RR) Not within 10-30 per min   Cardiac arrhythmias or ischemia New onset   Heart Rate  (HR) <60 or >120 beats per min   Mean arterial pressure (MAP) <55 or >002 mmHg   Systolic blood pressure (SBP) <90 or > 180 mmHg   Vasopressor infusion New or increasing   Phillips Agitation Scale (RASS) < - 3       Level I:   Breathe  (Rass -5 to -3)  HOB Angle - improve VAP protocol compliance   Visually confirm the [facility] is elevated >= 30 degrees to comply with VAP prevention protocols   The Centers for Disease Control and Prevention recommends an HOB angle of 30-45 degrees , unless contraindicated  Additional activities to be implemented   Every 2 hour turning   Passive range of motion   Up to 20 degrees reverse trendelenburg with lower extremity exercise/retracting footboard   Continuous lateral rotation therapy can be considered part of early mobility therapy in patients who are at high risk for pulmonary complications  Move to Level 2 when the patient  - Has acceptable oxygenation/hemodynamics  - Tolerates q 2 turning  - Tolerates HOB > 30 degrees or up to 20 degrees reverse trendelenburg    Level 2 :Tilt  Patient Assessment Rass > -3  (eg, opens eyes, may have profound weakness)  Up to 20 degrees Reverse Trendelenburg position and 10 degrees minimum HOB  - Reverse Trendelenburg positioning allows for orthostatic position in fragile patients  - If available , use in conjunction with retracting foot section to allow for partial weight bearing prior to sitting up in the bed or getting out of bed  Additional activities to be implemented  -  Maintain HOB >/= 30 degrees  - Q 2 hour turning  - Passive/active range of motion  - Legs dependent  - PT consultation       Move to Level 3 when the patient . .    -Tolerates active- assistance exercises 2 times per day    -Tolerates lower extremity exercises against footboard/Up to 20 degrees Reverse Trendelenburg    -Tolerates legs dependent /HOB 45 degrees  Level 3 :  SIT  (Rass >- 1 (eg , weak but may move arms/legs independently)   Full chair position (footboard on)   Full upright positioning allows for diaphragmatic excursion and lung expansion   Sitting with legs in a dependent position facilitates gas exchange  Additional activities to be implemented  - Maintain HOB >= 30 degrees  - Q 2 hour turning (assisted)  - Active range of motion - PT/OT actively involved  - Encourage activities of daily living  - Dangling, if patient can move arm against gravity  Move to Level 4 when the patient . .  - Tolerates increasing active exercise in bed  - Actively assists with every- 2- hour turning or turns independently  - Tolerates full chair position 3 times/day  Level 4:  Stand ( RASS >0 (eg, weak but may tolerate increased activity)      Stand Attempts   Full chair position (footboard off/feet on the floor)   Consider using a sit-to-stand lift   Pivot to chair, it tolerates partial weight bearing  Additional activities to be implemented  - Maintain head of bed >= 30 degrees  - Q 2 hr turning (self/assisted)  - Active range of motion  - Encourage activities of daily living  - PT/OT actively involved      Move to Level 5 when the patient .  - Can successfully comply with all activities  - Tolerates trial periods of full chair position (footboard off/feet on floor) 3 times per day  - Tolerates partial weight-bearing stand and pivots to chair    Level 5 :  Move  (RASS > 0    (eg, weak but may tolerate increased activity)   Achieve out of bed   Utilize mobile floor life to ambulate to bedside chair  Additional activities to be implemented  - Maintain HOB > = 30 degrees  - Q 2 hour turning (self/assisted)  - Active range of motion  - Patient stands/bears weight > 1 minute  - Patient marches in place  - PT/OT actively involved    Patient continues to ambulate progressively longer distances as tolerated until they consistently participate and move independently.         E Approved by [id number] 2-19-09   [facility] Clinical Guidelines             [facility] Flowsheet Content Variables to select when addressing section Comments   EMY Initiated  Yes/No  RN to address minimum q 24 hours (day shift)   Target RASS  0 = alert and oriented   -1 = drowsy   -2 = light sedation   -3= moderate sedation   -4= deep sedation Target on standard ventilator setting should be -2; -4 with oscillator   CAM -ICU  Positive   Negative   Unable to assess Delirium assessment   SAT Safety Screen Passed  Yes   No Select yes if proceeding on to the sedation vacation (reduction of continuous sedative drip by directed by MD)  Select no if your patient has any of the below reasons for not proceeding on to the daily awakening sedation vacation trial   SAT Screen for Failure  Active seizures   Acute delirium tremors   Agitation that threatens accidental line/tube removal   On paralytics   MI (24-48hr)   Abnormal ICP   Open abdomen Select one of the options when the patient will not undergo the sedation vacation - ALSO MUST OBTAIN AN ORDER FOR pascale [place] written under nursing miscellaneous for now by either the NP or Intensivist   Daily sedation Vacation/assessment of   Yes   No   Not applicable If yes, MUST see the reduction in sedation on the [facility] and please place in the comment section of the sedative sedation vacation started   SBT Safety Screen Passed  Yes   No Select Yes if the patient has none of the below listed reasons for not proceeding on to the SBT following reduction of sedation   SBT Screen Reason for Failure  Agitation   O2 Sat < or = 88%   FIO2 > 50%   PEEP >7   MI   Vasopressor Use   Bilevel setting on Vent   Oscillator in use   Increased resp effort Select reason as appropriate for NOT proceeding on to the SBT                                               Wake Up and Breathe Protocol

## 2020-07-10 NOTE — ED NOTES
Pt to CT via stretcher  From 4524-2824 with RT, CT tech, Med tech, and primary RN. Vitals remained stable during the CT.

## 2020-07-10 NOTE — PROGRESS NOTES
Patient currently waking to voice - opening eyes. Remains calm with RASS - 2 with Versed @ 2 mg/hour. No seizure activity noted.

## 2020-07-10 NOTE — PROGRESS NOTES
Called by [employer] Attending Dr. Michelle Tran patient with status epileptics. Patient is a assisted living resident with seizure history on 2 agent (keepra and valproic acid)/DM/HTN BIBA for seizure. DR. Castle felt possible ?post ictal. Initially was responsive to him but then had witnessed seizure by staff and given ativan. Not very alert with worsening oxygenation, and acidosis on ABG. He then intubated the patient. BP low and given Neosynephroine. Then another seizure noted and given ativan and loaded keepra at 60 mg/kg. CT head done and noted:    *BRAIN:      -  There are no early signs of territorial or lacunar infarction by CT.     -  Symmetric supratentorial atrophy. -  For patient's age, the scattered areas of white matter hypodensities may  represent a chronic small vessel white matter ischemia. However this is  Nonspecific. Dr. Michelle Tran called me than. He called family and inquired code status and per him his brother, Tan Jordan, though patient had DNR but was not on file. He would like to try. His number is [phone]    DR. Dewitt called Teleneurology for recommendation. Unfortunately, patient with seiures again and loaded with Versed. He reported patient was already on Diprivan drip for sedation. UA negatve. Drug screen negative. ETOH <3, WBC is 12 with N77. No fevers. CXR was clear except chronic scarring in RUL. ETT in place        Patient to be transported downtown for Status Epilepticus monitoring. Awaiting patient.      Amalia Su MD

## 2020-07-10 NOTE — PROGRESS NOTES
Pt received from [facility] ER. On arrival,  Orally intubated to vent. Versed infusing at 35.2mg/hr. propofol off. Dr Pastor Rhodes to bedside to assess patient. Versed dropped to 8mg/hr and Propofol restarted per Dr Pastor Rhodes. Rhythmic shaking of RUE noted. No movement noted to BLE to painful stim. BUE posturing to painful stim. Pupil pinpoint. Unable to see reaction with light. Pupillomiter used. +reactive. Skin intact. No skin breakdown noted. Some mottling noted to BLE.  EEG tech at bedside to attach patient to continuous EEG.

## 2020-07-10 NOTE — ROUTINE PROCESS
Respiratory Care Services     Policy Number: -[id number]    Title: Oxygen Protocol    Effective Date: 01/1996    Revised Date: 06/2013, 02/29/2016, 4/2018, 7/2019    Reviewed Date: 05/2014, 03/2015, 06/2017        I. Policy: The Oxygen Protocol will be initiated for all patients upon written order from physician for administration of oxygen therapy or if a patient is found to have an oxygen saturation of 88% or less. Special consideration: the goal of oxygen therapy for COPD patients is to maintain oxygen saturation between 88% - 92% to comply with GOLD Guidelines. II. Purpose: To provide protocol driven respiratory therapy for the administration of oxygen at concentrations greater than that in ambient air with the intent of treating or preventing the symptoms and manifestations of hypoxia. III. Responsibility: Director Respiratory Care Services, all Respiratory Care Practitioners     IV. Indications:   A. Implement this protocol for patients when physician orders oxygen to be administered or when patient is found to have an oxygen saturation of 88% or less. B. To assure routine monitoring of patient's oxygen saturation b.i.d. and to make appropriate adjustments in accordance with ordered oxygen saturation parameters. C. To assure continuity of respiratory care that meets [facility] Clinical Practice Guidelines and GOLD Guidelines. D. Hb < 8  E. Sickle Cell anemia crisis    V. Assessment:  Assess the following parameters to determine the need to adjust oxygen:  A. Measurement of patient's oxygen saturation via pulse oximetry. B. Observation of patient's color, respiratory effort, and responsiveness. C. Measurement of heart rate and respiratory rate. D. Complete a three-step ambulatory oxygen saturation when ordered. VI. Initiation:  Upon receipt of an order for oxygen, the RCP will:   A.  Verify order in the patient's EMR, which should include the desired oxygen saturation to be maintained. B. The patient shall be placed on oxygen with humidity (except for those oxygen delivery devices that do not require humidity, i.e. venturi masks and non-rebreather masks) as ordered by the physician to achieve the prescribed oxygen saturation. C. In the event that no saturation is specified, a saturation of 90% will be maintained. D. Patients, who are found to have a SaO2 of 88% or less, may be started on supplemental oxygen as described above. E. Patients admitted with cardiac problems/disease shall be maintained at 92% per Chest Committee recommendation. F. The patient will be informed of the \"no smoking policy\" and instructed in the proper use of oxygen therapy. G. Once desired oxygen saturation has been achieved, the RCP will document FIO2 and oxygen saturation in the respiratory section of the patient's EMR. VII. Maintenance:   A. 30-second oxygen saturation check will be taken to maintain the saturation ordered by the physician each day. B. Patients will be assessed each shift and as needed by pulse oximetry to determine if oxygen needs to be decreased, increased or discontinued. C. If changes in FIO2 are indicated, all changes will be documented in the respiratory section of the patient's EMR. D. If no changes in FIO2 are required, the patient's oxygen flow rate and saturation will be recorded in the respiratory section of the patient's EMR. E. Per [facility], patients who are receiving oxygen therapy but are not on oxygen at home, should be weaned off oxygen as soon as possible or when anticipated discharge becomes evident. Jackie Mangle will be discontinued after oxygen saturation has been maintained for 24 hours on room air and documented in the patient's EMR. G. Patients on the Inpatient Rehabilitation area on 9th floor will be exempt from having their oxygen discontinued per protocol.  Oxygen may be weaned but will be changed to prn to meet the needs of the patient when exercising and participating in therapy. H. The goal of oxygen therapy is to maintain patients with a diagnosis of COPD at oxygen saturation between 88% - 92% to comply with GOLD Guidelines. VIII. Safety: RCP will address the following safety issues:  A. Identify patient using the two patient identifiers name and birth date via ID bracelet. B. Perform hand hygiene per hospital policy utilizing Standard Precautions for all patients and following transmission-based isolation as indicated per hospital policy. C. Cardiac patients will be maintained at an oxygen saturation of 92%. D. If a patient's FIO2 requirements necessitate changing oxygen delivery devices to a high concentration of oxygen, documentation indicating the change must be included in the progress notes, as well as in the respiratory flowsheet. E. If a patient has a hemoglobin level <8 mg. RCP will consult physician before discontinuing oxygen. IX. Interventions:   A. RCP will assess patient for signs of respiratory distress or suspicion of CO2 retention. B. An ABG may be obtained for patients exhibiting respiratory distress or sickle cell      crisis. C. An order should be entered into patient's EMR for ABG under per protocol. X. Documentation  A. Document assessment findings in the respiratory section of the patient's EMR. B. Document changes in therapy per protocol in the respiratory orders section and in the care plan section of the patient's EMR. C. Document patient education in the patient education section of the patient's EMR. XI. Reportable Conditions:  Report to the physician immediately:  A. Acute changes in patient's respiratory status. B. An oxygen saturation <85%. C. A change in oxygen delivery device to provide a high concentration of oxygen. XII. Patient Instructions: Review with Patient  A. Purpose of oxygen therapy  B. Proper technique for using oxygen  C.  No smoking policy    Approval: Pulmonary Committee (1-25-96)  Revision: Chest Committee (4-28-05)    L - Respiratory Care Department Policy, Procedure and Protocol Guideline Manual, 1995,         KINZA Mendez. L - Therapist Driven Respiratory Care Protocols - A Practitioner's Guide for Criteria-Based       Respiratory Care by Jason Evangelista M.D., and KINZA Soria RRT. L - The rationale for therapist-driven protocols: an update. Respiratory Care 1998. [facility] Clinical Practice Guidelines. Respiratory Care Services       Policy Number: -[id number]    Title: Patient Care Assessment Protocol    Effective Date: 01/1999    Revised Date: 05/2014, 04/2018, 12/2018, 07/2019    Reviewed Date: 06/2013/ 03/2015, 03/2016, 06/2017        Overview  In an effort to improve quality and reduce costs of respiratory care at [facility], the Respiratory Department has developed a number of Patient Care Protocols. These protocols have been developed according to Flynn 3 and are utilized for those patients who are ordered respiratory therapy using therapeutic indications and standardized approaches for accomplishing objectives. Patient Care Protocols are intended to improve care by:   Defining the indications and standards of care agreed upon by the Pulmonary Medicine and [address] [place] [address] of [facility].  Training respiratory care practitioners to apply those criteria to individual patients and modify therapy as indicated by the protocols.  Documenting the indication and care plan as part of the initial ordering process.  Tapering or discontinuing treatments once the indication for therapy changes. The Patient Care Protocols shall be universally applied throughout the hospital as determined by   the Pulmonary Medicine and [address] [place], [place] [address]e.     Rationale for Patient Care Assessment Protocols:   Continuous Quality Improvement   Cost containment   Standardization of care   Enhanced continuity of care   Utilization review   Timely intervention    The following patient care assessment protocols have been developed:   Aerosolized Medication Protocol http://spweb/localUb[employer] Servicesstems/gv/stfrancis/policies/Respiratory%20Care%20Services%20Policies/-[id number]. doc    Bronchial Hygiene Protocol http://spweb/localU[employer] Servicesstems/gvl/stfrancis/policies/Respiratory%20Care%20Services%20Policies/-[id number]. doc   Oxygen Protocolhttp://spmichb/localBabybe/gvl/stfrancis/policies/Respiratory Care Services Policies/-[id number]. doc http://spTransMed Systemsb/[employer]/stfrancis/policies/Respiratory%20Care%20Services%20Policies/-[id number]. doc   CVRU Fast Track Weaning Protocol http://spweb/local[employer] Servicesstems/gvl/stfrancis/policies/Respiratory%20Care%20Services%20Policies/-[id number]. doc    Asthma Treatment Protocol ERhttp://spmichb/[employer] Servicesstems/gvl/stfrancis/policies/Respiratory Care Services Policies/-[id number]. doc http://charanTransMed Systemsb/[employer]/gvl/stfrancis/policies/Respiratory%20Care%20Services%20Policies/-[id number]. doc   Pediatric Asthma Treatment Protocol ERhttp://spmichb/local[employer]stems/gvl/stfrancis/policies/Respiratory Care Services Policies/-[id number]. doc http://spTransMed Systemsb/localBabybe/gv/stfrancis/policies/Respiratory%20Care%20Services%20Policies/-[id number]. doc   Alpha-1 Antitrypsin Deficiency Protocolhttp://spweb/[employer]s/gvl/stfrancis/policies/Respiratory Care Services Policies/-[id number]. doc http://charanTransMed Systemsb/local[employer]be/gvl/stfrancis/policies/Respiratory%20Care%20Services%20Policies/-[id number]. doc   Prone Positioning Protocol http://spb/[facility]/stfrancis/policies/Respiratory%20Care%20Services%20Policies/-[id number]. doc   COPD Protocol http://sp[facility]/localstems/[facility]/stfrancis/policies/Respiratory%20Care%20Services%20Policies/-[id number]. doc   Home Oxygen Assessment Protocolhttp://[facility]/stfrancis/policies/Respiratory Care Services Policies/-[id number]. doc http://spb/[facility]/stfrancis/policies/Respiratory%20Care%20Services%20Policies/-[id number]. doc   Ventilator Weaning Protocol http://spb/[facility]/stfrancis/policies/Respiratory%20Care%20Services%20Policies/-[id number]. doc   Lung Volume Expansion Protocolhttp://spweb/[facility]/stfrancis/policies/Respiratory Care Services Policies/-[id number]. doc http://[facility]/stfrancis/policies/Respiratory%20Care%20Services%20Policies/-[id number]. docm    The Director of [address] [place] [address] oversees the Patient Care Assessment Program. The Respiratory Educator is responsible for protocol development and training. The Supervisor is responsible for implementation and  activities. Each patient with an order for respiratory treatments will receive an evaluation. Respiratory Care Practitioners (RCP's) will perform the evaluations. The same evaluation tool will be utilized for initial and follow-up assessments. If the patient does not meet criteria for ordered therapy, the therapy will be discontinued. If the patient demonstrates an adverse response to initially ordered therapy, the therapy will be discontinued and the physician will be contacted. Specific physician's orders that deviate from protocols and are deemed \"inappropriate\" or \"unsafe\" will be addressed with ordering physician and/or medical director as required. Respiratory Patient Care Assessment Protocols    I. Policy:   In an effort to provide quality patient care and effective utilization of services, physicians who order respiratory therapy will have their patients treated via the protocols established (see attached) Respiratory Care Practitioners (RCP's) will complete the initial assessment which will indicate patient needs,  the care plan developed and will performed within 24 hours of admission. Frequency of the therapy will be set according to the results of the respiratory therapy evaluation and frequency guidelines policy. Reassessment will be continued every 48 hours and more frequently as needed for the individual patient. II. Purpose:     F. To provide a process that will allow for ongoing assessment and care plan modification for patients receiving respiratory services based on both objective and or subjective patient responses to interventions. This process of protocol utilization will assist in patient care progression while eliminating the need for the physician to continually update respiratory therapy orders. G. To assure continuity of respiratory care that meets [facility] Clinical Practice Guidelines. III. Initiation:  Implement Respiratory Care Protocols for patients who are ordered by physician          to receive respiratory therapy procedures or for ventilator management. IV. Protocol:  E. Upon receiving an order for therapy the RCP will review the patient's EMR (electronic medical record) for all pertinent information includin. Physician's order for therapy  2. Patient history and physical examination  3. Physician progress notes  4. Diagnostic. X-rays, PFT's, arterial blood gases etc.  F. The RCP will perform a respiratory assessment in the following manner:  1. General observations: color, pattern and effort of breathing, chest expansion, (symmetrical and bilateral), level of consciousness and the ability to ambulate. 2. The RCP will assess patient's cough ability and determine if bronchial hygiene is needed. If patient is unable to produce sputum, at that time, the RCP should question the patient with regard to their sputum: production, color consistency, frequency and amount.   3. Auscultation: Using a stethoscope, the RCP will listen and note quality of breath sounds and presence or absence of adventitious breath sounds in all lung fields, both anteriorly and posteriorly. 4. Upon completing the EMR review and physical assessment, the RCP will document findings in the RT Assessment section of the EMR. The score level will be provided and will be used to determine the frequency of therapy. V. Indications:   A. Bronchial Hygiene Protocol indications:   1. Potential for or presence of atelectasis. 1. Need for hydration and removal of retained secretions. 1. Need for improvement of cough effectiveness. 1. Presence of conditions associated with disorder of pulmonary clearance:  a. Cystic fibrosis  b. Bronchiectasis  c. Neuromuscular disease  d. Obstructive lung diseases  e. Restrictive lung diseases   Aerosolized Medication(s) Protocol indications:Treatment of bronchospasm/wheezing  2. Improvement of mucociliary clearance  3. Treatment of stridor  4. History of Bronchiectasis, Asthma or COPD  C. Oxygen Therapy Protocol indications:   1. Documented hypoxemia  2. Severe trauma  3. Acute myocardial infarction  4. Short-term therapy (e.g. post anesthesia recovery)  D. CVRU Ventilator Weaning Protocol indications:  1. All mechanically ventilated surgical patients unless they have a no wean order. E. Asthma Treatment Protocol ER indications:  1. Patients 15years of age and older that have been triaged or diagnosed with   asthma exacerbation shall be indicated for the ER Asthma Treatment Protocol. A physician order will be required to initiate the protocol. F. Pediatric Asthma protocol in the ER indications:  1. Patients less than 15years old that have been triaged or diagnosed with asthma exacerbation shall be indicated for the Pediatric Asthma protocol. A physician order will be required to initiate the protocol. G. Alpha-1 Antitrypsin Deficiency Testing protocol indications:         1. Patients admitted and diagnosed with COPD. H. Prone Positioning Protocol indications:         1. Acute lung injury         2.  Acute respiratory distress syndrome (ARDS)   I. Respiratory Care COPD Protocol indications:         1. History of COPD in patient's records         2. Smoking history   J. Home Oxygen Assessment Protocol indications:         1. Chronic lung disease         2. Cor pulmonale         3. Unable to wean to room air 48 hours prior to anticipated discharge. K.  Ventilator Weaning Protocol indications:         1. Patient's mechanically ventilated          2. Managed by intensivist  L. Lung Volume Expansion Protocol indications:        1. Any patient at risk for pulmonary complications. VI. Maintenance:    H. Timely patient assessment is an integral part of this protocol therefore the following will be applied:  1. All non- critical care patients will be evaluated upon receiving initial respiratory care orders within 24 hours and re-evaluated within 48 hours (or more as needed). 2. Orders requesting a Respiratory Consult will be responded to in the following manner:  a. In patient emergency situations, the RCP assigned to the floor will respond immediately to the patient, provide an initial respiratory assessment, and contact the patient's physician as necessary for appropriate orders. b. In non-emergent situations, the RCP assigned to the floor will respond to the patient within 90 minutes and provide an initial respiratory assessment and contact patient's physician as necessary for appropriate orders. c. An RCP will provide a comprehensive assessment as soon as possible. 3. Upon completion of an evaluation, the RCP will complete documentation in the patient's EMR in the RT Assessment section. 4. The RCP who completes the assessment will document orders for therapy in the orders section of the patient's EMR selecting new order. Next, per protocol should be selected indicating it is a protocol order and sign orders should be selected to complete the process.  The applicable protocol must be added to the progress note per Joint Commission guidelines. 5. The Pharmacy and Therapeutics (P&T) Committee has mandated that the medication Xopenex may be changed to unit dose albuterol without an order, except for those patients receiving Xopenex due to cardiac arrhythmias. 1. The dosage for these patients should be 0.63 mg. and may be changed from 1.25 mg. to 0.63 mg per P & T Committee by the RCP completing the assessment. 6. Patients who are not experiencing cardiac arrhythmias, and are ordered Xopenex and Atrovent may be changed to Duoneb. VII. Safety:        I. The following safety issues shall be monitored:  1. The RCP will perform hand hygiene per hospital policy utilizing Standard Precautions for all patients and following transmission-based isolation as indicated per hospital policy. 2. The RCP must exercise professional judgment in classifying the patient for frequency of therapy. 3. Appropriate classification of the patient will require an evaluation utilizing the Therapy Assessment Protocol Guidelines. 4. The RCP will confer with the physician concerning the care of the patient at any time questions or problems arise. 5. If during therapy, the patient exhibits no improvement, or deterioration in clinical status the RCP will notify the physician and the patient's nurse. VIII. Interventions:   F. The patient's nurse is responsible concerning all items related to his/her care. Ongoing communication with nursing is essential to successful protocol management. G. The RCP recognizes the value of the team approach in meeting the patient's needs. Nursing input regarding the patient's pulmonary condition will be sought as needed. IX. Reportable conditions: The RCP will inform the physician if:  1. There are acute changes in patient's respiratory status. 2. The therapist is unable to determine appropriate care plan upon assessment. 3. The patient fails to reach therapeutic objective.   4. A change or additional medication is needed. X.  Patient Education:    D. Patient will receive instruction on the followin. The treatment modality, including objectives and proper technique of therapy  2. Respiratory medications  E. Documentation shall occur in the patient education section of the patient's EMR. XI. Documentation: Record all findings as described above in the patient's EMR. Related Protocols: A. Aerosolized Medication Protocol  B. Bronchial Hygiene   C. Oxygen Protocol   D. [facility] Fast Track Weaning Protocol  E. Asthma Treatment protocol ER  F. Alpha-1 antitrypsin Deficiency Protocol  G. Prone Positioning Protocol  H. Respiratory Care COPD Protocol  I. Home Oxygen assessment Protocol  J. Ventilator Weaning Protocols   K. Volume Expansion protocol    Indications      Frequency          Level  A. Aerosol therapy   1.  q4h     Severe SOB, wheezing, unable to sleep 1   2.  qid, q4 wa or q6h   Moderate SOB, wheezing   2   3.  tid      Hx of asthma, or COPD mild wheezing,         or facilitate secretion removal              3   4.  bid      Asthma, or COPD, Intermittent wheezing 4   5. PRN, i.e. tid PRN, qid PRN Asthma, or COPD, occasional wheezing 5       B. Bronchopulmonary Hygiene    1. q4h             Copious secretions, SOB, unable to sleep 1   2. qid & PRN            Moderate amounts of secretions   2   3. tid           Small amounts of secretions and poor cough,               history of secretions    3    4. PRN, i.e. tid PRN, qid PRN     Breathing exercises, encourage cough only 4      C. Oxygen Therapy     Follow hospital approved Oxygen Protocol      Note:  qid treatments are due 0800, 1200, 1600, and 2000. tid treatments are due 0800, 1400, and 2000  Q6h treatments are due 0800, 1400, 2000, 0200  Q4 wa teatments are due 0800, 1200, 1600, and 2000. Q4h treatments are due  0800, 1200, 1600, 2000, 0000, and 0400.         The Level 1-5 rating system is only to be used as criteria for determining appropriate frequency of therapy. References:   N   Joint Commission Consolidated Henrry Standard   L    Respiratory Care Department Policy, Procedure and Protocol Guideline Manual, 1995, KINZA MARTÍNEZ  Therapist Driven Respiratory Care Protocols - A Practitioner's Guide for Criteria-Based Respiratory Care by Pricilla Ha M.D., and KINZA Mcdowell RRT. L  The rationale for therapist-driven protocols: an update. Respiratory Care 1998; 28:949-658   L Therapist Driven Respiratory Care Protocols - A Practitioner's Guide for Criteria-Based Respiratory Care by Pricilla Ha M.D., and KINZA Mcdowell RRT. L The rationale for therapist-driven protocols: an update. Respiratory Care 1998; [id number]. N   [facility] Clinical Practice Guidelines. D. The RCP will perform a respiratory assessment in the following manner:  1. Perform hand hygiene per hospital policy utilizing Standard Precautions for all patients and following transmission-based isolation as indicated per policy. 2. Identify patient via ID bracelet verifying patient name and birth date. 3. General observations: color, pattern and effort of breathing, chest expansion, (symmetrical and bilateral), level of consciousness and the ability to ambulate. 4. The RCP will assess patients cough ability and determine if Nasotracheal suctioning is needed. If patient is unable to produce sputum, at that time, the RCP should question the patient with regard to their sputum: production, color consistency, frequency and amount. 5. Auscultation: Using a stethoscope, the RCP will listen and note quality of breath sounds and presence or absence of adventitious breath sounds in all lung fields, both anteriorly and posteriorly. 6. Upon completing the EMR review and physical assessment, the RCP will document findings in the RT Assessment section of the EMR. The score level will be provided and will be used to determine the frequency of therapy.     V. Indications:   A. Indications for Bronchial Hygiene Protocol will include:  1. Potential for or presence of atelectasis. 2. Need for hydration and removal of retained secretions. 3. Need for improvement of cough effectiveness. 4. Presence of conditions associated with disorder of pulmonary clearance:  a. Cystic fibrosis  b. Bronchiectasis  B. Indications for Aerosolized Medication(s) Protocol should include:  1. Treatment of bronchospasm/wheezing  2. Improvement of mucociliary clearance  3. Treatment of stridor  4. History of Asthma or COPD             C.  Indications for Oxygen Therapy Protocol should include:  1. Documented hypoxemia  2. Severe trauma  3. Acute myocardial infarction  4. Short-term therapy (e.g. post anesthesia recovery)    VI. Maintenance:    D. Timely patient assessment is an integral part of this protocol therefore the following will be applied:  1. All non- critical care patients will be evaluated upon receiving initial respiratory care orders within 24 hours and re-evaluated within 48 hours (or more as needed). 2. Orders requesting a Respiratory Consult will be responded to in the following manner:  a. In patient emergency situations, the RCP assigned to the floor will respond immediately to the patient, provide an initial respiratory assessment, and contact the patients physician as necessary for appropriate orders. b. In non-emergent situations, the RCP assigned to the floor will respond to the patient within 90 minutes and provide an initial respiratory assessment and contact patients physician as necessary for appropriate orders. c. An RCP will provide a comprehensive assessment as soon as possible. 3. Upon completion of an evaluation, the RCP will complete documentation in the patients EMR in the RT Assessment section. 4. The RCP who completes the assessment will document orders for therapy in the orders section of the patients EMR selecting new order.  Next, per protocol should be selected indicating it is a protocol order and sign orders should be selected to complete the process. 5. The Pharmacy and Therapeutics (P&T) Committee has mandated that the medication Xopenex may be changed to unit dose albuterol without an order, except for those patients receiving Xopenex due to cardiac arrhythmias. The dosage for these patients should be 0.63 mg. and may be changed from 1.25 mg. to 0.63 mg per P & T Committee by the RCP completing the assessment. 6. Patients who are not experiencing cardiac arrhythmias, and are ordered Xopenex and Atrovent may be changed to Duoneb. VII. Safety:        A. The following safety issues shall be monitored:  1. The RCP will perform hand hygiene per hospital policy utilizing Standard Precautions for all patients and following transmission-based isolation as indicated per hospital policy. 2. The RCP must exercise professional judgment in classifying the patient for frequency of therapy. 3. Appropriate classification of the patient will require an evaluation utilizing the Therapy Assessment Protocol Guidelines. 4. The RCP will confer with the physician concerning the care of the patient at any time questions or problems arise. 5. If during therapy, the patient exhibits no improvement or deterioration in clinical status the RCP will notify the physician and the patients nurse. VIII. Interventions:   A. The patients nurse is responsible concerning all items related to his/her care. Ongoing communication with nursing is essential to successful protocol management. B. The RCP recognizes the value of the team approach in meeting the patients needs. Nursing input regarding the patients pulmonary condition will be sought as needed. IX. Reportable conditions: The RCP will inform the physician if:  1. There are acute changes in patients respiratory status.   2. The therapist is unable to determine appropriate care plan upon assessment. 3. The patient fails to reach therapeutic objective. 4. A change or additional medication is needed. X.  Patient Education:    A. Patient will receive instruction on the followin. The treatment modality, including objectives and proper technique of therapy  2. Respiratory medications  B. Documentation shall occur in the patient education section of the patients EMR. XI. Documentation: Record all findings as described above in the patients EMR. Related Protocols: A. Aerosolized Medication Protocol  B. Bronchial Hygiene  C. Oxygen Protocol   D. Volume Expansion/Secretion Clearance  E. Ventilator Weaning Protocols    References:  N   Joint Commission Consolidated [place] Standard   L    Respiratory Care Department Policy, Procedure and Protocol Guideline Manual, , KINZA Mendez. L  Therapist Driven Respiratory Care Protocols - A Practitioners Guide for Criteria-Based Respiratory Care by Charlene Vásquez M.D., and KINZA Wright RRT. L  The rationale for therapist-driven protocols: an update. Respiratory Care 1998; [facility] Guidelines. Respiratory Care Services Policy Number: -[id number]    Title: Aerosolized Medication Protocol    Effective Date: 10/1998    Revised Date: , , ,      Reviewed Date: / 03/15 , ,    I. Policy: The Aerosolized Medication Protocol shall by implemented by Respiratory Care Practitioners (RCP) for patients with orders to receive aerosol therapy with medication. II. Purpose: To open and maintain obstructed airways, the RCP, will utilize the following   protocol to select the indicated aerosolized medication(s) and determine the most effective method of delivery to the patient. III. Patient Type: All patients who are determined to meet aerosolized medication criteria as          outlined in this protocol. IV.  Responsibility: Director, Codey Sánchez, registered Respiratory Care Practitioners (RCP's) with documented competency in the performance of respiratory therapeutic techniques. V. Equipment needed:  H. Stethoscope  I. Pulse oximeter  J. AeroEclipse nebulizer  K. Dry Powder Inhaler (DPI)     VI. Protocol:   G. The following conditions are accepted indications for aerosolized medication therapy. 5. Bronchospasm/wheezing  6. Impaired mucociliary clearance  7. Tracheobronchial mucosal congestion/and laryngeal stridor  8. Diseases which commonly require aerosolized medication therapy include, but are not limited to:  f. Asthma/reactive airway disease  g. Bronchitis/emphysema (COPD)  h. Cystic fibrosis  i. Severe laryngitis/tracheitis  j. Bronchiectasis  k. Smoke inhalation or chemical trauma to the lung or upper airway  l. Physical trauma to the upper airway  m. Laryngotracheobronchitis  n. Bronchiolitis  o. Non-specific wheezing              B. Indications for bronchodilator medications will include:  d. Bronchospasm/ wheezing  e. Asthma/reactive airway disease  f. Chronic obstructive pulmonary disease  g. Obstructive defect on pulmonary function testing  C. Administration of medications  5. If a bronchodilator or any other type of respiratory medication is needed, a physician order must be indicated in the medication section in the patient's EMR. 6. When the physician specifies the medication and dosage at the time of request, the ordered medication will be used as part of the care plan. D. The following guidelines will be utilized in the evaluation and selection of the appropriate delivery device for indicated medication(s):  1. Unassisted aerosol (UA) is the preferred method of aerosol delivery and indicated if  c. Ventilation is inadequate  d. Patient demonstrates wheezing   e. Routine treatments shall be given via the AeroEclipse nebulizer.   f. The Aerogen nebulizer shall be used in the following circumstances:  i. ER patients and they will continue with this nebulizer if admitted to 8th floor or ICU.  ii. Patients in ICU   iii. Patients on 8th floor with severe wheezing (at the RCP's discretion)  2. Dry PowderInhaler (DPI)   d. Patient should be alert/cooperative  e. Able to perform 3 second breath hold. f. Patient has used DPI therapy previously, either at home or in the hospital.  g. Note: The only approved inhaler on formulary is Spiriva. VII. Guidelines:   Monitor patient's vital signs and evaluate patient's clinical status. The need to change medication and/or modality may be indicated by:  5. A pulse greater than 120 bpm, or if a pulse increase of 20 bpm occurs with bronchodilator medications. 6. Significant worsening of dyspnea or wheezing occurring during or within 30 minutes of discontinuing therapy. 7. Worsening of patient's sensorium (e.g. patient becomes confused or obtunded, and unable to follow directions). 8. Worsening of patient's chest x-ray. 9. Change in sputum (e.g. increased pulmonary infiltrate, which might indicate need for volume expansion therapy). 10. Patient has difficulty coughing up secretions, which might indicate need for acetylcysteine and/or bronchial hygiene therapy. 11. Call physician immediately if dyspnea worsens and is not responsive to modifications allowed by protocol. VIII. Clinical Responsibility:  2. The therapy assessment guidelines will be used to evaluate all patients receiving aerosolized medications with the exception of critical care areas. 5. RCP's will perform changes in therapy per protocol. 6. It will be the responsibility of RCP to provide instruction regarding respiratory medications, possible side effects, aerosol therapy and proper DPI technique, as well as, spacer usage to patients ordered DPI therapy. 7. Current therapy that is part of a patient's home regimen will not be discontinued. a. Provide spacer and educate patient on proper inhaler technique if needed.     IX. Documentation  D. Document assessment findings in the respiratory assessment section of the patient's EMR. E. Document changes in therapy per protocol in the respiratory orders section and in the care plan section of the patient's EMR. F. Document patient education in the patient education section of the patient's EMR. X. Outcome Criteria:  I. Relief of wheezes and obstruction  J. Improved cough and sputum color and consistency  K. Improved chest x-ray  L. Improved arterial oxygen tension and or SaO2  M. Improved Peak Flow on asthmatic patients        XI. Related Protocols:  J. Respiratory Patient Care Protocols  K. Bronchial Hygiene Therapy  L. Oxygen Protocol    Reference:  L - Respiratory Care Department Policy, Procedure and Protocol Guideline Manual, 1995, KINZA Mendez. L -  Therapist Driven Respiratory Care Protocols - A Practitioner's Guide for Criteria-Based Respiratory Care by Sotero Mace M.D., and KINZA Adhikari RRT. L - The rationale for therapist-driven protocols: an update. Respiratory Care 1998; [id number]. Pending sale to [facility] Clinical Practice Guidelines. Respiratory Care Services     Policy Number: -[id number]    Title: Bronchial Hygiene Protocol    Effective Date: 01/1999    Revised Date: 12/2014, 11/2017, 7/2019    Reviewed Date: 06/2013, 05/2014, 03/2015, 03/2016, 06/2017, 4/2018     I. Purpose: The Respiratory Care Practitioner (RCP) will utilize the following protocol to select and initiate bronchial hygiene therapy to open and maintain obstructed airways when indicated. II. Patients: All patients who are ordered bronchial hygiene therapy. III. Clinical Area: All general patient floors     IV. Protocol: The following conditions or diseases are indications for bronchial hygiene                           therapy. L. Oscillating PEP Therapy        Indications should include:   9. Atelectasis caused by mucus plugging or foreign body  10.  Chronic mucociliary clearance disorders  11. Retained secretions which may be associated with the following conditions:  p. Bronchitis  q. Bronchiectasis  r. Pneumonia  M. PAP- Positive airway pressure therapy  Indications include:  7. Patients with post-operative atelectasis or to prevent post operative atelectasis. 8. Patients who cannot perform deep breathing exercises due to pain. 9. Patients requiring lung expansion therapy who cannot follow instructions. 10. Patients requiring lung expansion therapy with poor inspiratory capacity <10cc/kg. 11. Patients requiring aerosol therapy in conjunction with opening their airways. N. Vibratory / Acoustical Airway Clearance Therapy (ACT)- i.e. (Vibralung, Vest, or Percussor)  Indications should include  12. Patient conditions  that involve retained secretions, increased mucus production and defective mucociliary clearance such as:  h. Cystic fibrosis  i. Chronic bronchitis  j. Bronchiectasis  k. Pneumonia  l. Asthma  m. Muscular dystrophy  n. Post-operative atelectasis  o. Neuromuscular respiratory impairments  p. ACT may be considered in patients with COPD with  symptomatic secretion retention, guided by patient preference,  toleration, and effectiveness of therapy Hari Ignacio et al., 2013). O. Nasotracheal suctioning indications should include:  8. Inability to cough effectively  9. Excessive secretions  10. Artificial airway      V. Equipment:   A. PEP therapy device   B. Vest therapy equipment   Ursula Major   D. AccuPap   Norrine Mo NT suction equipment       VI. Guidelines:   Monitor patient's vital signs and evaluate patient's clinical status. The need to                                change therapy modality may be indicated by:  Lavanda Band in patient's sensorium (patient now confused or obtunded, and unable to follow directions). I. A significant deterioration is evident on patient's chest radiograph or increased sputum production.   J. Increased thickening of secretions (e.g. mucolytic therapy may be indicated.)  K. Development of wheezing  L. Decrease in oxygen saturation  M. Development of chest pain. VII. Clinical Responsibility: The Therapy Assessment Protocol guidelines will be used to                re-evaluate all patients on bronchial hygiene therapy (See Therapy Assessment Protocol). N. RCP's will perform changes in therapy according to protocol. Kash Desir Bronchial hygiene therapy may be discontinued when goals of therapy are met, e.g., secretions easily expectorated for 48 hours, atelectasis is resolved, etc.  P. PAP Therapy may be utilized in place of IPPB therapy per discretion of the RCP, as approved by the Pulmonary Medicine and [address] [place] [address]. VIII. Outcome Criteria:  Outcome criteria for bronchial hygiene therapy should include:  M. Decrease in sputum production  N. Improved breath sounds  O. Improved arterial oxygen tension and/or SaO2  P. Improved chest X-ray  Q. Subjective response to therapy    IX. Documentation  G. Document assessment findings in the respiratory assessment section of the patient's EMR. H. Document changes in therapy per protocol in the respiratory orders section and in the care plan section of the patient's EMR. I. Document patient education in the patient education section of the patient's EMR. X. Related Protocols:  3. Respiratory Patient Care Assessment Protocols  2. Aerosolized Medication Protocol  2. Oxygen Therapy Protocol        Reference:    L - Respiratory Care Department Policy, Procedure and Protocol Guideline Manual, 1995, KINZA Mendez. L - Therapist Driven Respiratory Care Protocols - A Practitioner's Guide for Criteria-Based         Respiratory Care by Jason Evangelista M.D., and KINZA Soria, KURT. N - [facility] Clinical Practice Guidelines. Danie Wolff., Xavi Lee., Rex Hastings., Arnav Menard., Srinivasa Parham., . . . HOMERO Olguin (2013, December).  [facility] Clinical Practice Guideline: Effectiveness of Nonpharmacologic Airway Clearance Therapies in Hospitalized Patients. Respiratory Care, 58(37), 9974-8767. Retrieved June 28, 2019                      Guideline     Guideline Number: -[id number]     Title: Management of the Patient with Mechanical Ventilation (including weaning) and ABCDE Bundle    Effective Date:  03/00    Revised Date: 02/09, 03/10, 7/12, 5/13,                                  10/13, 8/14  Reviewed Date: 07/2015, 04/2016, 06/2017       I. Policy:  Management of the patient requiring mechanical ventilation, including readiness to wean and weaning protocol. The information provided serves as a guideline for patient management. Included in this guidelines is the ABCDE Bundle to provide guidance to staff for evidence based management of pain, agitation/anxiety and delirium in the intensive care unit. The goals of critical care analgesia and sedation are to facilitate mechanical ventilation, to prevent patient and caregiver injury, and to avoid the psychological and physiologic consequences of inadequate treatment of pain, anxiety, agitation, and delirium by maintaining a light level of sedation. Pain occurs commonly in adult ICU patients, regardless of admitting diagnosis. Therefore, pain should be frequently assessed and analgesic medications titrated to prevent adverse effects associated with either inadequate or excessive analgesia. Once pain has been addressed, anxiolytic and/or antipsychotic medications can be utilized to treat unresolved agitation/anxiety and delirium, with the goal to prevent over- or under sedation by using the Phillips Agitation Sedation Scale (RASS). Assertive management of these issues has been shown to reduce costs, improve ICU outcomes such as successful extubation and ICU length of stay, and allow for patients to participate in their own care. II. Purpose:   The respiratory care practitioner and the critical care RN will utilize the following guideline to provide the most efficient and effective management of mechanical ventilators and weaning and extubation processes. Goals of Treatment:  1. Adequate management of patients pain and discomfort while maintaining a light level of                   sedation (RASS score of 0 to -1)  2. Both chronic and acute sources of pain should be identified and treated. 3. Sedative agents should be considered if patient still expresses discomfort and/or is not at RASS         goal of 0 to -1 despite adequate management of pain. 4. Patients requiring neuromuscular blockage must have continuous infusions of analgesic and              sedative agents. III. Responsibility: Director Respiratory Care Services and all Respiratory Care Practitioners  with documented competency as well as Critical Care RN staff. General Guidelines    1. Introduction to Ventilator Plan Phase  a. Ventilator Management Phase, General Statement  -  The plan should be initiated in patients who have a secure airway/require invasive mechanical ventilation (endotracheal or tracheostomy) only  -   The provider determines the appropriate medications used for analgesia and agitation/anxiety along with the clinical pharmacist    2. Monitoring Levels of Comfort  a. Pain Assessment  - Pain is monitored using the numerical scales  - A pain assessment should be conducted, at a minimum, every 4 hours and as needed and per guidelines. - The level of pain should be determined as satisfactory by the patient based on patients baseline level of pain , considering any chronic pain that the patient may have. - If the patient is unable to communicate pain level, the nurse can assess for nonverbal indicators including facial grimacing, moaning, tachypnea, tachycardia, hypertension, diaphoresis, etc as a cue to begin further pain assessment.             b.  Sedation Assessment  - Sedation is monitored using the Phillips Agitation Sedation Scale (RASS)  Target RASS RASS Description   +4 Combative, violent, danger to staff     +3 Pulls or removes tubes(s) or catheters; aggressive   +2 Frequent nonpurposeful movement, fights ventilator   +1 Anxious, apprehensive, but not aggressive   0 Alert and calm   -1 Awakens to voice (eye opening/contact) > 10 sec   -2 Light sedation, briefly awakens to voice (eye opening/contact) < 10 sec   -3 Moderate sedation, movement or eye opening. No eye contact   -4 Deep sedation, no response to voice, but movement or eye opening to physical stimulation   -5 Unarousable, no response to voice or physical stimulation     -  Goal RASS is 0 to -1, unless otherwise specified by providers order.  - Nursing staff should conduct the RASS every 4 hours and as needed to maintain goal RASS of 0 to -1.  - If RASS is outside of goal range, discuss treatment options with provider.  - A RASS score of +2 to +4 requires further assessment by the nurse. Causes of agitation/anxiety that should be considered include:  a. Pulmonary -   endotracheal tube malposition or patency, mode of ventilation, pneumothorax, hypoxemia, hypercarbia  b. Metabolic - hypoglycemia, hyponatremia, acute renal or hepatic failure  c. Emotional upset - with information and awareness of critical condition, prognosis, need for surgical or invasive procedures, other interventions or complications, family or personal stressors  - C. Sedation Assessment while using Neuromusclar Blocking Agents  - D. Delirium Assessment  a. the ICU ([facility] - ICU)   3. Analgesia  The incidence of significant pain has been reported to be 50% or higher in both medical and surgical ICU patients.   These patients also experience discomfort during routine/procedural ICU care and at rest.  However, patients may be unable to self-report their pain (either verbally or with other signs) because of an altered level of consciousness, the use of mechanical ventilation, or high doses of sedative agents or neuromuscular blocking agents. The short and long term consequences of unrelieved or inadequately treated pain are significant and include patient discomfort, decreased satisfaction with care by family and patient, delirium, agitation/anxiety, post traumatic stress disorder and depression. Therefore, routine assessment and treatment of pain should occur in all ICU patients. Causes and Treatment of Pain in the ICU  a. Acute pain (post-operative, procedural pain, discomfort with usual ICU care or other acute episodes of pain-related to underlying disease)  1. Consider use of PCA for alert and oriented patients with pain needs not met by PRN dosing or opoids. 2. Preemptive analgesia should occur prior to chest tube removal, and should be considered for other procedural pain such as turning and repositioning, would drain removal, wound dressing change, tracheal suctioning, femoral catheter removal or place of central venous catheter. 3. Appropriate analgesic medications for preemptive analgesia are short acting intravenous (IV) agents (i.e. fentanyl, morphine, hydromorphone)  a. Administration of analgesia before patient experiences noxious stimuli prevents amplification and hyperexcitability of the central nervous system. b. Analgesia for Mechanically Ventilated Patients:  1. The approach to sedation and analgesia management for mechanically ventilated patients favors use of analgesia first sedation. The primary goal of this strategy is to address pain and discomfort first, and then if necessary, add anxiolytic agent. 2. Analgesia first sedation reduces dose escalation of medications, decreases the duration of mechanical ventilation and the incidence of VAP, improves the probability of successful extubation, and ultimately shortens ICU length of stay. 3. For pain management, analgesic medications are determined by the provider.    Intermittent dosing of the analgesic should be attempted first.    If the patient requires more than 3 doses within 1 hour then provider should be contacted to consider continuous infusion. 4.  Analgesic options for mechanically ventilated patients include:  a. Fentanyl which is considered the drug of choice for patients requiring continuous infusion. b.Morphine may be considered for those patients without renal dysfunction who are hemodynamically stable and require intermittent pain medication. Continuous infusions of morphine may be used for patientl who are receiving comfort care as part of end of life care. c.Hydromorphone is reserved for patients who are refractory to fentanyl or morphine and is typically admininstered by intermittent dosing. 4.  Agitation/Anxiety    Background  Agitation and anxiety frequently occur in ICU patients. Anxiolytic/sedation agents may be indicated to help relieve discomfort, improve synchrony with mechanical ventilation, and decrease the overall work of breathing. Pain control alone may be sufficient to make patients comfortable enough to require no anxiolytic/sedative agent. In addition, non-pharmacologic interventions such as repositioning or verbal assurance may be helpful to comfort or redirect an agitated patient. If these methods are unsuccessful, then anxiolytic/sedative medications such as propofol, dexmedetomidine, or benzodiazepines can be used. Selection of an anxiolytic should be based on the pharmacokinetic properties of the medication, patient specific characteristics, and sedation goal.   However, nonbenzodiazepine sedatives (ie propofol or dexmedetomidine) may be preferable over benzodiazepines (ie midazolam or larazepam) due to more favorable outcomes such as delirum. Causes and Treatment of Agitation/Anxiety  a. Possible underlying causes of agitation and anxiety include pain, delirium, hypoxemia, hypoglycemia, hypotension, or withdrawal from alcohol  and other drugs.   b. Analgesia first sedation should be attempted initially to manage pain and provide sedation in appropriate patients. Analgesia alone may be adequate to reach RASS goal of 0 to -1. If patient remains agitated or anxious despite adequate analgesia (ie RASS +2 to +4) then anxiolytic/sedative should be considered. c. The choice of anxiolytic should be based on desired levels of sedation (ie light sedation or deep sedation) with preference for the use of nonbenzodiazepines such as propofol or dexmedetomidine if appropriate. While light sedation (ie RASS 0 to -1) is preferred for most patients, there are instances when deep sedation (ie RASS -4 to -5) is desired. For example, in the setting of ventilator dysynchrony due to ARDS or for patients receiving NMB agents. d. Medications to maintain light sedation (ie RASS 0 to -1) include  1. Propofol continuous infusion can be considered for hemodynamically stable (ie SBP = 100, MAP = 65 and/or not requiring vasopressor support) patients requiring light sedation. Propofol has a quick onset (1-2 minutes) and offset of action, making it a good agent to assess neurological status and facilitate liberation from the mechanical ventilator. 2.Dexmedetomidine continuous infusion is a good option for hemodynamically stable patients requiring light sedation as it allows for a more awake, interactive patient is associated with less delirium. It has an intermediate onset of action (5-10 min). Therefore, abrupt titrations should be avoided, but use of prn haloperidol or benzodiazepine may be useful to manage agitation until the medication takes effect. 3. Antipsychotics are another option. In particular, haloperidol intermittently dosed may be useful for patients with symptoms of agitation/anxiety and delirium. 4.Benzodiazapines can also be considered for light sedation, but should be intermittently doses. Midazolam is an option for patients without renal dysfunction.   It has a short onset of action (2-5 minutes) making it a good agent for acute agitation/anxiety, but short duration of action resulting in frequent dosing. Lorazepam is another option. It has a longer onset of action (15-20 minutes) in comparison to midazolam, but longer duration of action. e. Medications to maintain deep sedation (RASS -4 to -5) include:  1) Propofol continuous infusion should be considered as a first line option for hemodynamically stable patients. 2)Benzodiazepines can be considered as second line options for deep sedation. Studies comparing these agents to other sedatives have shown that they lead to worse outcomes including delirium, oversedation, delayed extubation, and longer time to discharge. Midazolam is one option for patients without renal dysfunction and lorazepam is another options. If patient requires more than 3 doses within 1 hour then contact provider  to consider initiation of continuous infusion. 5.  Daily Sedation Awakening Trial (SAT) from IV Continuous Analgesia/Sedation  a. Patients are to have daily awakening from sedation while on continuous IV analgesia and/or sedation in the ICU. Continuous analgesia infusions may be maintained only if needed for active pain and RASS is at goal 0 - -1. Unit guideline is for the SAT to occur following ICP rounds each morning.    b. The sedation awakening trial (SAT) is done regardless if the patient meets criteria for spontaneous breathing trial (SBT). c. SAT safety screen is assessed and SAT should not be performed if sedation is being used for active seizures, alcohol withdrawal, hemodynamically unstable or requiring support of vasoactive medications , in conjunction with NMB agents, if ICP is greater than  20mmHg or if sedation is being used to control ICP, patients RASS is +3 or +4 (very agitated or combative).   Other exclusion criteria are:  if there is documentation of myocardial ischemia in the past 24 hours; or patient is receiving high frequency oscillator ventilation (HFOV) , if the patient has an open chest /abdomen or is receiving comfort care. d. Criteria for passing the SAT are the patient opened their eyes to verbal stimuli or tolerated sedative interruption without exhibiting failure criteria.  e. Patients fail the SAT if the develop sustained anxiety, agitation, or pain; a respiratory rate of 35 per minutes for 5 minutes or longer, an SpO2 less than 88% for 5 minutes or longer; an acute cardiac dysrhythmia; two or more signs of respiratory distress including tachycardia, bradycardia; use of accessory muscles; diaphoresis; marked dyspnea; or myocardial ischemia. f. Respiratory therapy staff must verify with the nurse that continuous IV analgesia (unless being use  for active pain) and sedation (unless patient is receiving dexmedetomidine) is off prior to placing patient on SBT. g. DO NOT interrupt infusion of analgesia and sedation medications if patient is receiving neuromuscular blockade.  h. Monitor level of wakefulness unless patient is awake and follows commands (RASS 0 to -1) or patient becomes uncomfortable or agitated (RASS +3 to +4)  i. If agitation prevents successful awakening , administer bolus of analgesia and/or sedation then resume infusion of the medication at ½ previous dose and titrate as needed.  j. If oversedation prevents successful awakening, hole infusion until at goal and resume ½ of prior infusion rate/dose if clinically indicated. 6. Delirium  Background  Delirium is characterized by the acute onset of cerebral dysfunction with a change or fluctuation baseline  mental status, inattention, and either disorganized thinking or an altered level of consciousness.     It affects up to 80% of mechanically ventilated adult ICU patients, and is associated with increased mortality,   and treatment is important and may in turn allow for a patient to be conscious yet cooperative enough to participate   in ventilator weaning trials and early mobilization efforts. Delirium can only be assessed in patients who are able to sufficiently interact and communicate with bedside  clinicians (ie RASS -3 to +4). IV. Procedure:  A. Assessment: The following criteria must be assessed prior to the initiation of weaning from mechanical ventilation. Note: The criteria are general guidelines and must be individualized for each patient. The patients primary nurse will be responsible, in coordination with the RT, the Spontaneous Awakening Trial). The RT will perform the SBT. B. Spontaneous Awakening Trials (SATs - also referred to as Sedation Vacation) and Spontaneous Breathing Trials (SBTs) performed to determine readiness to wean. 1. For patients who meet established criteria, such as those without active seizures, alcohol withdrawal and agitation, myocardial ischemia or those requiring cardiac support devices, without increased intracranial pressure and those not receiving neuromuscular blockade, the nurse will reduce the infusions of sedative by 50% of current used for sedation that was used to achieve a level of light sedation (Mack Score 2 or RASS score of 0 to -1) and evaluate patient response to reduction of sedation. Analgesics required for pain control are continued during the test.  Obtain MD order to cover no SAT for that time period if patient has any exclusion criteria as described above. 2. Failure of the spontaneous awakening trial occurs when the patient shows symptoms such as increased agitation, anxiety, pain or signs of respiratory distress including respiratory rate >35/min or oxygen saturation <88% as well as development of acute cardiac arrhythmias. If these symptoms develop during the SAT, the nurse then restarts sedation at 75% of the previous dose and titrates the medications until the patient is comfortable and/or symptoms have abated.     3.  If the patient passes the SAT then the patient moves on to the Spontaneous Breathing Trials as performed by the RT. The SBT Safety Screen included the following:  No agitation, oxygen saturation > 88%, FIO2 < 50%, PEEP < 7.5 cm H20, no myocardial ischemia, no vasopressor use, and with inspiratory efforts. 4. Patients who pass the spontaneous awakening trial but fail the spontaneous breathing trial are placed back on full ventilator support and reassessed the next day. 5. Failure of the SBT (spontaneous breathing trail) includes any of the following:  Respiratory rate > 35/min, respiratory rate < 8/min, oxygen saturation < 88%, respiratory distress, mental status change, acute cardiac arrhythmia. 6. Extubation is considered for patients who tolerate the spontaneous awakening trial and pass the spontaneous breathing trial.    C. Can the cause of respiratory failure be reversed (i.e. absence of high spinal cord injury or advanced ALS)? D. Is gas exchange adequate? 1. PaO2/FIO2 ratio > 150 - 200,  2. PEEP < 8 cm H20  3. FIO2 < 50  4. pH > 7.30  5. Rapid shallow breathing index (f/VT) < 105  E. Is patient hemodynamically stable? 1. Absence of clinically significant hypotension (minimal vasopressors such as Dopamine < 5mcg/kg/minute)? F. Is there evidence of intact respiratory drive (NIP/NIF >-04 IWL01, stable VC02)? G. Does patient have an adequate cough, airway clearance ability? H. Is there absence of excessive secretions? V. Initiation:     A. The therapist shall consult with RN to determine if sedation can be discontinued or significantly decreased. If this can be achieved, the therapist shall implement the                     followin. Identify patient and verify name and account number via ID bracelet. 2. Perform hand hygiene per hospital policy utilizing Standard Precautions for all patients and following transmission-based isolation as indicated per hospital policy. 3. Perform a ONE-MINUTE SPONTANEOUS TRIAL AND ASSESSMENT.    4. Measure Rapid Shallow Breathing Index (RSBI) and monitor SpO2 and cardiovascular parameters during the spontaneous breathing assessment. 5. If SpO2 and cardiovascular parameters are stable, continue spontaneous breathing trial for at least 30 minutes and up to 120 minutes, as patient tolerates. 6. Monitor ventilatory status, SpO2, and cardiovascular status during spontaneous breathing trial.  7. If patient has a successful trial, consider patient as a candidate for extubation and obtain order. 8. If patient fails the weaning trial, place back on ventilator and adjust settings to provide a non-fatiguing form of ventilatory support for the remainder of the day and night. 9. One attempt at weaning shall be performed each day until successful weaning occurs. The RCP will make every attempt to begin the spontaneous breathing trials between 0500 and 0600 to provide documentation of the trial when the pulmonologist makes rounds. B.  Assessment of SBT or PST:  1. Is gas exchange acceptable? 2. PaO2 > 60 mm Hg. 3. PH > 7.30  4. Increase in PaCO2  < 10 mm Hg  C. Is patient hemodynamically stable? 1. HR < 120 beats/minute  2. HR  < 20%   3. Systolic BP < 736 and > 90 mmHg  4. BP  < 20%, no vasopressors required  D. Does patient have stable ventilatory pattern? 1. Sustained RR < 30 breaths per minute  2. Normal and stable VCO2  3. Patient is not demonstrating any signs of increased work of breathing, such as increased use of accessory muscles. E. Mental status stable throughout trial?  1. Absence of changes such as somnolence, excessive agitation or anxiety  2. Absence of diaphoresis during trial?  IV. Safety:    A. The RCP shall monitor patient according to the above guidelines. If at any time during the weaning process, the respiratory therapist or nurse feels that the patient is not tolerating weaning, the therapist shall place patient back on previous ventilator settings. B.  The patient shall be reassessed and the weaning process should be continued the following day. V. Reportable Conditions:    A. The therapist shall notify the physician, as appropriate, for any of the following         conditions:  1. FIO2 increase (sustained) at 10% or greater  2. Poor patient/ventilator interface in spite of adjustments  3. Need for increased sedation for respiratory distress  4. Need for increasing ventilating pressures (i.e. PEEP, PIP, MAP)  5. ABG results meeting panic value criteria or other clinical signs indicating deterioration of patients condition. 6. Unplanned extubation. 7. Unexplained sustained increase in PIP greater than 10 cm H2O.  8. Assessment results regarding ventilator discontinuance process. VI. Ventilator protocol management   A. The following items should be maintained for patients who are being mechanically           ventilated. 1. Obtain STAT Chest X-Ray for ET tube placement after insertion. 2. Chest X-Ray q a.m. while on ventilator. 3. ABGs 30 - 60 minutes after being stable on the ventilator. 4. ABG's q a.m. while on ventilator and prn.  5. Do spontaneous breathing trials when patient is hemodynamically stable, responsive, and without fever. 6. Terminate trials if patient exhibits signs of respiratory distress. 7. Therapists should maintain ABG s as follows:      a. pH -  7.30 - 7.50                   b. PaO2 -   60 - 100        8. Racemic Epinephrine (0.5cc) for post extubation stridor (2 UA treatments max.)    VII.   Early Mobilization    Mobility Level Criteria Start at Level 1 if:   PaO2/FIO2 <250   Positive end-expiratory Pressure (PEEP) >=10 cm H2O   O2 saturation <90%   Respiratory Rate (RR) Not within 10-30 per min   Cardiac arrhythmias or ischemia New onset   Heart Rate  (HR) <60 or >120 beats per min   Mean arterial pressure (MAP) <55 or >173 mmHg   Systolic blood pressure (SBP) <90 or > 180 mmHg   Vasopressor infusion New or increasing   Phillips Agitation Scale (RASS) < - 3       Level I:   Breathe (Rass -5 to -3)  HOB Angle - improve VAP protocol compliance   Visually confirm the [facility] is elevated >= 30 degrees to comply with VAP prevention protocols   The Centers for Disease Control and Prevention recommends an HOB angle of 30-45 degrees , unless contraindicated  Additional activities to be implemented   Every 2 hour turning   Passive range of motion   Up to 20 degrees reverse trendelenburg with lower extremity exercise/retracting footboard   Continuous lateral rotation therapy can be considered part of early mobility therapy in patients who are at high risk for pulmonary complications  Move to Level 2 when the patient  - Has acceptable oxygenation/hemodynamics  - Tolerates q 2 turning  - Tolerates HOB > 30 degrees or up to 20 degrees reverse trendelenburg    Level 2 :Tilt  Patient Assessment Rass > -3  (eg, opens eyes, may have profound weakness)  Up to 20 degrees Reverse Trendelenburg position and 10 degrees minimum HOB  - Reverse Trendelenburg positioning allows for orthostatic position in fragile patients  - If available , use in conjunction with retracting foot section to allow for partial weight bearing prior to sitting up in the bed or getting out of bed  Additional activities to be implemented  -  Maintain HOB >/= 30 degrees  - Q 2 hour turning  - Passive/active range of motion  - Legs dependent  - PT consultation       Move to Level 3 when the patient . .    -Tolerates active- assistance exercises 2 times per day    -Tolerates lower extremity exercises against footboard/Up to 20 degrees Reverse Trendelenburg    -Tolerates legs dependent /HOB 45 degrees  Level 3 :  SIT  (Rass >- 1 (eg , weak but may move arms/legs independently)   Full chair position (footboard on)   Full upright positioning allows for diaphragmatic excursion and lung expansion   Sitting with legs in a dependent position facilitates gas exchange  Additional activities to be implemented  - Maintain HOB >= 30 degrees  - Q 2 hour turning (assisted)  - Active range of motion - PT/OT actively involved  - Encourage activities of daily living  - Dangling, if patient can move arm against gravity  Move to Level 4 when the patient . .  - Tolerates increasing active exercise in bed  - Actively assists with every- 2- hour turning or turns independently  - Tolerates full chair position 3 times/day  Level 4:  Stand ( RASS >0 (eg, weak but may tolerate increased activity)      Stand Attempts   Full chair position (footboard off/feet on the floor)   Consider using a sit-to-stand lift   Pivot to chair, it tolerates partial weight bearing  Additional activities to be implemented  - Maintain head of bed >= 30 degrees  - Q 2 hr turning (self/assisted)  - Active range of motion  - Encourage activities of daily living  - PT/OT actively involved      Move to Level 5 when the patient .  - Can successfully comply with all activities  - Tolerates trial periods of full chair position (footboard off/feet on floor) 3 times per day  - Tolerates partial weight-bearing stand and pivots to chair    Level 5 :  Move  (RASS > 0    (eg, weak but may tolerate increased activity)   Achieve out of bed   Utilize mobile floor life to ambulate to bedside chair  Additional activities to be implemented  - Maintain HOB > = 30 degrees  - Q 2 hour turning (self/assisted)  - Active range of motion  - Patient stands/bears weight > 1 minute  - Patient marches in place  - PT/OT actively involved    Patient continues to ambulate progressively longer distances as tolerated until they consistently participate and move independently.         E Approved by [address] 2-19-09   N [facility] Clinical Guidelines             [facility] Flowsheet Content Variables to select when addressing section Comments   EMY Initiated  Yes/No  RN to address minimum q 24 hours (day shift)   Target RASS  0 = alert and oriented   -1 = drowsy   -2 = light sedation   -3= moderate sedation   -4= deep sedation Target on standard ventilator setting should be -2; -4 with oscillator   CAM -ICU  Positive   Negative   Unable to assess Delirium assessment   SAT Safety Screen Passed  Yes   No Select yes if proceeding on to the sedation vacation (reduction of continuous sedative drip by directed by MD)  Select no if your patient has any of the below reasons for not proceeding on to the daily awakening sedation vacation trial   SAT Screen for Failure  Active seizures   Acute delirium tremors   Agitation that threatens accidental line/tube removal   On paralytics   MI (24-48hr)   Abnormal ICP   Open abdomen Select one of the options when the patient will not undergo the sedation vacation - ALSO MUST OBTAIN AN ORDER FOR no SEDATION VACATION written under nursing miscellaneous for now by either the NP or Intensivist   Daily sedation Vacation/assessment of   Yes   No   Not applicable If yes, MUST see the reduction in sedation on the [facility] and please place in the comment section of the sedative sedation vacation started   SBT Safety Screen Passed  Yes   No Select Yes if the patient has none of the below listed reasons for not proceeding on to the SBT following reduction of sedation   SBT Screen Reason for Failure  Agitation   O2 Sat < or = 88%   FIO2 > 50%   PEEP >7   MI   Vasopressor Use   Bilevel setting on Vent   Oscillator in use   Increased resp effort Select reason as appropriate for NOT proceeding on to the SBT                                               Wake Up and Breathe Protocol

## 2020-07-10 NOTE — PROGRESS NOTES
Nutrition Note    Nutrition:  Reason for assessment: Nutrition Consult for Electrolyte Management per Nutritional Support Protocols (Dr. Karine Cornejo)   Assessment:  Diet order(s): NPO  Food/Nutrition History:  Hx of DM,parkinson's. Presented with seizure. Intubated in ED at [facility] 7/9 and transferred to [facility]. Anthropometrics: Height 5'7\", Weight Source: Bed, Weight: 71.3 kg (157 lb 3 oz), Body mass index is 24.62 kg/m². Debbie Ask BMI class of normal weight. Macronutrient needs:  · EER:  9371-4500 kcal /day (20-25 kcal/kg bedscale BW)  · EPR:  57-71 grams protein/day (0.8-1 grams/kg IBW)  Pertinent labs:  Lab Results   Component Value Date/Time    Potassium 3.8 07/10/2020 03:45 AM    Sodium 148 (H) 07/10/2020 03:45 AM    Phosphorus 2.1 (L) 07/10/2020 03:45 AM    Magnesium 1.7 (L) 07/10/2020 03:45 AM    Glucose 171 (H) 07/10/2020 03:45 AM    Creatinine 0.89 07/10/2020 03:45 AM     Nutrition Diagnosis:   Inadequate mineral intake ( Mg and Phos) related to decreased ability to consume sufficient amount of minerals as evidenced by low Mg and Phos. Inadequate oral intake r/t neuro status as evidenced by seizure requiring intubation preludes po intake. Intervention:   Meals and snacks: . Await extubation anf  initiation and/or progression of p.o. diet as GI status allows  Nutrition Supplement Therapy: Activate Nutrition Support Orders for Electrolyte Replacement. Replace Phos and Mg based on protocol guidelines. EN: If it is expected that patient will be require intubation > 72 hr, suggest place FT and start TF. If TF is pursued, please order nutrition consult for TPN management. Nutrition discharge plan:  Too soon to determine        Electronically signed by Murtaza Eid RD on 7/10/2020 at 9:32 AM    Contact: Liberty Villalobos, [address], [address] [place] [address], [address] [place] [address]

## 2020-07-10 NOTE — H&P
HISTORY AND PHYSICAL      Alan Muñoz    7/10/2020    Date of Admission:  7/10/2020    The patient's chart is reviewed and the patient is discussed with the staff. Subjective:     Called by Chani Perez Attending Dr. Candy Whalen patient with status epileptics. Patient is a assisted living resident with seizure history on 2 agent (keepra and valproic acid)/DM/HTN BIBA for seizure. DR. Castle felt possible ?post ictal. Initially was responsive to him but then had witnessed seizure by staff and given ativan. Not very alert with worsening oxygenation, and acidosis on ABG. He then intubated the patient. BP low and given Neosynephroine. Then another seizure noted and given ativan and loaded keepra at 60 mg/kg. CT head done and noted:     *BRAIN:      -  There are no early signs of territorial or lacunar infarction by CT.     -  Symmetric supratentorial atrophy.     -  For patient's age, the scattered areas of white matter hypodensities may  represent a chronic small vessel white matter ischemia.  However this is  Nonspecific.             Dr. Candy Whalen called me than. He called family and inquired code status and per him his brother, Colin Gayle, though patient had DNR but was not on file. He would like to try. His number is [phone]     DR. Dewitt called Teleneurology for recommendation. Unfortunately, patient with seiures again and loaded with Versed. He reported patient was already on Diprivan drip for sedation.      UA negatve. Drug screen negative. ETOH <3, WBC is 12 with N77. No fevers. CXR was clear except chronic scarring in RUL. ETT in place          Patient to be transported downtown for Status Epilepticus monitoring. Awaiting patient. Patient just arrived downtown now. Apparently high dose versed stopped due to bradycardia and also they stopped his diprivan. Noted to be moving his right hand rhtymically for a few seconds. Told to restart his diprovan. SBP in 180's. EEG being done now.      Review of Systems  Sedated and not able to assess    Patient Active Problem List   Diagnosis Code    Status epilepticus ([facility] G40.901    DM2 (diabetes mellitus, type 2) ([facility] E11.9    Acute kidney insufficiency N28.9    Acute respiratory failure with hypoxia (HCC) J96.01    Lactic acid increased E87.2    Essential hypertension I10       Prior to Admission Medications   Prescriptions Last Dose Informant Patient Reported? Taking? LORazepam (ATIVAN) 0.5 mg tablet   Yes No   Sig: Take  by mouth. albuterol (PROVENTIL HFA, VENTOLIN HFA, PROAIR HFA) 90 mcg/actuation inhaler   No No   Sig: Take 2 Puffs by inhalation every six (6) hours as needed for Wheezing. alogliptin (NESINA) 6.25 mg tablet   Yes No   Sig: Take 12.5 mg by mouth. amantadine HCl (SYMMETREL) 100 mg capsule   Yes No   Sig: Take 100 mg by mouth. aspirin 81 mg chewable tablet   Yes No   Sig: Take 81 mg by mouth three (3) times daily as needed for Pain (as needed for pain or fever). atorvastatin (LIPITOR) 40 mg tablet   Yes No   Sig: Take 20 mg by mouth. diphenhydrAMINE (BENADRYL) 25 mg capsule   Yes No   Sig: Take 25 mg by mouth three (3) times daily. divalproex ER (DEPAKOTE ER) 250 mg ER tablet   Yes No   Sig: Take 250 mg by mouth two (2) times a day. glipiZIDE (GLUCOTROL) 10 mg tablet   Yes No   Sig: Take 10 mg by mouth two (2) times a day. levETIRAcetam (KEPPRA) 750 mg tablet   No No   Sig: Take 1 Tab by mouth two (2) times a day. lisinopril (PRINIVIL, ZESTRIL) 10 mg tablet   Yes No   Sig: Take 10 mg by mouth daily. Take 1 half tab every day by mouth   magnesium oxide (MAG-OX) 400 mg tablet   No No   Sig: Take 1 Tab by mouth two (2) times a day. metFORMIN (GLUCOPHAGE) 1,000 mg tablet   Yes No   Sig: Take 1,000 mg by mouth two (2) times a day. polyethylene glycol (MIRALAX) 17 gram packet   Yes No   Sig: Take 17 g by mouth daily. propranolol (INDERAL) 10 mg tablet   Yes No   Sig: Take  by mouth daily.    risperiDONE (RISPERDAL M-TAB) 2 mg disintegrating tablet   No No   Si tab every 8 hours as needed for agitation   sAXagliptin (ONGLYZA) 5 mg tab tablet   Yes No   Sig: Take  by mouth daily. tamsulosin (FLOMAX) 0.4 mg capsule   Yes No   Sig: Take 0.4 mg by mouth. Facility-Administered Medications: None       Past Medical History:   Diagnosis Date    Diabetes ([facility]     Hypertension      No past surgical history on file. Social History     Socioeconomic History    Marital status: UNKNOWN     Spouse name: Not on file    Number of children: Not on file    Years of education: Not on file    Highest education level: Not on file   Occupational History    Not on file   Social Needs    Financial resource strain: Not on file    Food insecurity     Worry: Not on file     Inability: Not on file    Transportation needs     Medical: Not on file     Non-medical: Not on file   Tobacco Use    Smoking status: Former Smoker     Years: 60.00    Smokeless tobacco: Never Used   Substance and Sexual Activity    Alcohol use: Not Currently    Drug use: Not on file    Sexual activity: Not on file   Lifestyle    Physical activity     Days per week: Not on file     Minutes per session: Not on file    Stress: Not on file   Relationships    Social connections     Talks on phone: Not on file     Gets together: Not on file     Attends Voodoo service: Not on file     Active member of club or organization: Not on file     Attends meetings of clubs or organizations: Not on file     Relationship status: Not on file    Intimate partner violence     Fear of current or ex partner: Not on file     Emotionally abused: Not on file     Physically abused: Not on file     Forced sexual activity: Not on file   Other Topics Concern    Not on file   Social History Narrative    Not on file     No family history on file. No Known Allergies    No current facility-administered medications for this encounter.             Objective:     Vitals:    07/10/20 0102 Pulse: 81   Resp: 18   SpO2: 95%         Ventilator Settings  Mode FIO2 Rate Tidal Volume Pressure PEEP   VC+  51 %    0.45 ml     8 cm H20      Peak airway pressure: 19 cm H2O   Minute ventilation: 7.01 l/min          PHYSICAL EXAM     Constitutional:  the patient is well developed and in no acute distress on Vent. EENMT:  Sclera clear, pupils pinpoint b/l. No response to light. Respiratory: CTA b/l. No wheezing  Cardiovascular:  RRR without M,G,R  Gastrointestinal: soft and non-tender; with positive bowel sounds. Musculoskeletal: warm without cyanosis. There is no lower extremity edema. Skin:  no jaundice or rashes, no visible wounds   Neurologic: pinpoint pupils. Mild decorticate posturing in Right hand to pain. no  Psychiatric:  Unresponsive and sedated    CXR: intubated and Right upper lobe with minimal scarring.        Recent Labs     07/09/20  1809   WBC 11.9*   HGB 12.6*   HCT 38.4*        Recent Labs     07/09/20  2256 07/09/20  1809    138   K 5.4* 4.8   * 107   * 218*   CO2 18* 21   BUN 24* 24*   CREA 1.27 1.27   MG  --  2.1   CA 7.9* 9.2   ALB  --  3.7   TBILI  --  0.4   ALT  --  26     Recent Labs     07/09/20  2101 07/09/20  1947   PHI 7.35 7.15*   PCO2I 30.2* 37.9   PO2I 106* 119*   HCO3I 16.8* 13.2*     Recent Labs     07/09/20  2103   LAC 5.7*       Assessment:  (Medical Decision Making)     Hospital Problems  Never Reviewed          Codes Class Noted POA    Status epilepticus ([facility] ICD-10-CM: G40.901  ICD-9-CM: 345.3  7/9/2020 Unknown        DM2 (diabetes mellitus, type 2) ([facility] ICD-10-CM: E11.9  ICD-9-CM: 250.00  7/10/2020 Unknown        Acute kidney insufficiency ICD-10-CM: N28.9  ICD-9-CM: 593.9  7/10/2020 Unknown        Acute respiratory failure with hypoxia (HCC) ICD-10-CM: J96.01  ICD-9-CM: 518.81  7/10/2020 Unknown        Lactic acid increased ICD-10-CM: E87.2  ICD-9-CM: 276.2  7/10/2020 Unknown        Essential hypertension ICD-10-CM: I10  ICD-9-CM: 401.9 7/10/2020 Unknown            Elderly debiliated patient with seizures history(on Depakote and Keppra)/DM/HTN came in with seizures and now in status epileptics and loaded with Keepra/Diprovan Drip/Versed drip. On Vent. CT head with no acute pathology a lot of atrophy and chronic changes. Plan:  (Medical Decision Making)     --admit to ICU  --start EEG and will need 24 hour monitoring  --continue above seizures agents -- Keepra/diprovan drip/Versed drip with ativan prn for now  --get neurology to see in AM  --check his Valproic acid levels  --continue Vent  --new ABG shortly. --trend LA level -- this is not sepsis. No fevers, no elevated WBC. UA negative. CXR is unchanged compared to prior studies. Does have 2 B/C as well. --f/u sugars r/o hypoglycemia. --stop his Risperdal since can cause seizures as SE  --get new labs and x-ray shortly. --DVt/PUD prophylaxis. Clarify goals of care with Family in AM. AS per above -- per Dr. Trevor Hendricks (ER attending) his brother, Leo Basilio thought the patient was a DNR. However, the patientdid not have a  DNR  on file. Will request ICU doctor reassess patient in AM and call his brother at  [phone]. Condition is critical  Time spent with care tonight was 56 minutes. More than 50% of the time documented was spent in face-to-face contact with the patient and in the care of the patient on the floor/unit where the patient is located.     Kellee Hernandez MD

## 2020-07-10 NOTE — PROCEDURES
CEEG Interim Report    CEEG reviewed through time 3:17 PM on 7/10/2020. Findings:    1) Continued poorly organized background of low amplitude    2) Patient event button pressed at 1211, 1308, and 1318 for some jerking of his left hand. No epileptiform activity seen.       Full report to follow.

## 2020-07-10 NOTE — CONSULTS
Palliative Care    Patient: Malinda Ko MRN: [id number]  SSN: [id number]    YOB: 1945  Age: 77 y.o. Sex: male       Date of Request: 7/10/2020  Date of Consult:  7/10/2020  Reason for Consult:  goals of care and medical decision making  Requesting Physician: Dr. Kana eLe     Assessment/Plan:     Principal Diagnosis:    Altered Mental Status R41.82    Additional Diagnoses:   · Acute Respiratory Failure, Unspecified  J96.00  · Debility, Unspecified  R53.81  · Counseling, Encounter for Medical Advice  Z71.9  · Encounter for Palliative Care  Z51.5    Palliative Performance Scale (PPS):   PPS: sedated    Medical Decision Making:   Reviewed and summarized labs and imaging. Pt sedated and intubated. I spoke with pt brother and updated on current condition. Seizures currently suppressed. I discussed options if we are able to wake pt and wean off vent. Pt brother would like for pt to remain full code at this time given quick turnaround of pt. I explained that should pt be oriented we will also discuss with him what his wishes would be. Brother expressed his understanding and gratitude for ongoing care. He asked to be updated with any significant changes. Will discuss findings with members of the interdisciplinary team.      Thank you for this referral.         Subjective:     History obtained from:  Family and Chart    Chief Complaint: altered mental status  History of Present Illness:  Patient is a assisted living resident with seizure history on 2 agent (keepra and valproic acid)/DM/HTN BIBA for seizure. DR. Castle felt possible ?post ictal. Initially was responsive to him but then had witnessed seizure by staff and given ativan. Not very alert with worsening oxygenation, and acidosis on ABG. He then intubated the patient. BP low and given Neosynephroine. Then another seizure noted and given ativan and loaded keepra at 60 mg/kg.     Advance Directive: No       Code Status:  Full Code Health Care Power of : pt brother would be decision maker    Past Medical History:   Diagnosis Date    Diabetes ([facility] [address] [facility]     Hypertension       No past surgical history on file. No family history on file. Social History     Tobacco Use    Smoking status: Former Smoker     Years: 60.00    Smokeless tobacco: Never Used   Substance Use Topics    Alcohol use: Not Currently     Prior to Admission medications    Medication Sig Start Date End Date Taking? Authorizing Provider   alogliptin (NESINA) 6.25 mg tablet Take 12.5 mg by mouth. Anthony Awan MD   amantadine HCl (SYMMETREL) 100 mg capsule Take 100 mg by mouth. Anthony Awan MD   atorvastatin (LIPITOR) 40 mg tablet Take 20 mg by mouth. Anthony Awan MD   LORazepam (ATIVAN) 0.5 mg tablet Take  by mouth. Anthony Awan MD   tamsulosin (FLOMAX) 0.4 mg capsule Take 0.4 mg by mouth. Anthony Awan MD   levETIRAcetam (KEPPRA) 750 mg tablet Take 1 Tab by mouth two (2) times a day. 9/11/19   Arti Guy MD   magnesium oxide (MAG-OX) 400 mg tablet Take 1 Tab by mouth two (2) times a day. 9/11/19   Arti Guy MD   albuterol (PROVENTIL HFA, VENTOLIN HFA, PROAIR HFA) 90 mcg/actuation inhaler Take 2 Puffs by inhalation every six (6) hours as needed for Wheezing. 2/20/17   Conchis Jose MD   risperiDONE (RISPERDAL M-TAB) 2 mg disintegrating tablet 1 tab every 8 hours as needed for agitation 2/20/17   Conchis Jose MD   metFORMIN (GLUCOPHAGE) 1,000 mg tablet Take 1,000 mg by mouth two (2) times a day. Anthony Awan MD   glipiZIDE (GLUCOTROL) 10 mg tablet Take 10 mg by mouth two (2) times a day. Anthony Awan MD   divalproex ER (DEPAKOTE ER) 250 mg ER tablet Take 250 mg by mouth two (2) times a day. Anthony Awan MD   sAXagliptin (ONGLYZA) 5 mg tab tablet Take  by mouth daily. Anthony Awan MD   diphenhydrAMINE (BENADRYL) 25 mg capsule Take 25 mg by mouth three (3) times daily.     Anthony Awan MD   polyethylene glycol (MIRALAX) 17 gram packet Take 17 g by mouth daily. Anthony Awan MD   lisinopril (PRINIVIL, ZESTRIL) 10 mg tablet Take 10 mg by mouth daily. Take 1 half tab every day by mouth    Anthony Awan MD   propranolol (INDERAL) 10 mg tablet Take  by mouth daily. Anthony Awan MD   aspirin 81 mg chewable tablet Take 81 mg by mouth three (3) times daily as needed for Pain (as needed for pain or fever). Anthony Awan MD       No Known Allergies     Review of systems unable to obtain due to mentation      Objective:     Visit Vitals  BP (!) 89/56   Pulse 76   Temp 98.3 °F (36.8 °C)   Resp 18   Wt 157 lb 3 oz (71.3 kg)   SpO2 100%   BMI 24.62 kg/m²        Physical Exam:    General:  Sedated . Eyes:  Conjunctivae/corneas clear    Nose: Nares normal. Septum midline. Neck: Supple, symmetrical, trachea midline, no JVD   Lungs:   Clear to auscultation bilaterally, unlabored   Heart:  Regular rate and rhythm, no murmur    Abdomen:   Soft, non-tender, non-distended. Positive bowel sounds   Extremities: Normal, atraumatic, no cyanosis or edema   Skin: Skin color, texture, turgor normal. No rash or lesions.    Neurologic: sedated   Psych: sedated      Assessment:     Hospital Problems  Never Reviewed          Codes Class Noted POA    DM2 (diabetes mellitus, type 2) ([facility] (Chronic) ICD-10-CM: E11.9  ICD-9-CM: 250.00  7/10/2020 Unknown        Acute kidney insufficiency ICD-10-CM: N28.9  ICD-9-CM: 593.9  7/10/2020 Unknown        Acute respiratory failure with hypoxia ([facility] ICD-10-CM: J96.01  ICD-9-CM: 518.81  7/10/2020 Unknown        Lactic acid increased ICD-10-CM: E87.2  ICD-9-CM: 276.2  7/10/2020 Unknown        Essential hypertension (Chronic) ICD-10-CM: I10  ICD-9-CM: 401.9  7/10/2020 Unknown        * (Principal) Status epilepticus ([facility] ICD-10-CM: G40.901  ICD-9-CM: 345.3  7/9/2020 Unknown              Signed By: Bill Leyva MD     July 10, 2020

## 2020-07-10 NOTE — PROGRESS NOTES
Labs noted and WBC is up slighty. On ABX already. Sodium going up and will add some D5. Lytes are off and will replace with nutrition support orders. PH down and will get ABG for later today. Ordered valproic acid level. Do not see before.      Shalini Wright MD        LABS    Recent Labs     07/10/20  0345 07/09/20  1809   WBC 14.6* 11.9*   HGB 12.7* 12.6*   HCT 39.8* 38.4*    231     Recent Labs     07/10/20  0345 07/09/20  2256 07/09/20  1809   * 139 138   K 3.8 5.4* 4.8   * 111* 107   * 224* 218*   CO2 14* 18* 21   BUN 19 24* 24*   CREA 0.89 1.27 1.27   MG 1.7*  --  2.1   PHOS 2.1*  --   --    INR 1.0  --   --      Recent Labs     07/10/20  0306 07/09/20  2101 07/09/20  1947   PHI 7.28* 7.35 7.15*   PCO2I 37.1 30.2* 37.9   PO2I 159* 106* 119*   HCO3I 17.4* 16.8* 13.2*     Recent Labs     07/10/20  0340 07/09/20  2103   LAC 2.7* 5.7*

## 2020-07-10 NOTE — PROCEDURES
[facility] Neurology   Routine Electroencephalogram Report      DATE:  07/10/2020  Time Start: 0159 Time End: 1595    EEG Number:      Indication:  77year old male with history of seizures who presented to the ER with seizure and then acutely became unresponsive.     Medications:   Current Facility-Administered Medications   Medication Dose Route Frequency Provider Last Rate Last Dose    amantadine HCL (SYMMETREL) capsule 100 mg  100 mg Oral DAILY Gregorio Freeman MD        atorvastatin (LIPITOR) tablet 40 mg  40 mg Oral QHS Penelope Sheppard MD   Stopped at 07/10/20 0200    lisinopriL (PRINIVIL, ZESTRIL) tablet 10 mg  10 mg Oral DAILY Gregorio Freeman MD        polyethylene glycol (MIRALAX) packet 17 g  17 g Oral DAILY Gregorio Freeman MD        propranoloL (INDERAL) tablet 10 mg  10 mg Oral DAILY Gregorio Freeman MD        tamsulosin (FLOMAX) capsule 0.4 mg  0.4 mg Oral DAILY Gregorio Freeman MD        propofoL (DIPRIVAN) 10 mg/mL injection  0-50 mcg/kg/min IntraVENous TITRATE Gregorio Freeman MD 6.3 mL/hr at 07/10/20 0316 15 mcg/kg/min at 07/10/20 0316    midazolam in normal saline (VERSED) 1 mg/mL infusion  0-10 mg/hr IntraVENous CONTINUOUS Gregorio Freeman MD 8 mL/hr at 07/10/20 0719 8 mg/hr at 07/10/20 0719    sodium chloride (NS) flush 5-40 mL  5-40 mL IntraVENous Q8H Penelope Sheppard MD   10 mL at 07/10/20 0629    sodium chloride (NS) flush 5-40 mL  5-40 mL IntraVENous PRN Penelope Sheppard MD        heparin (porcine) injection 5,000 Units  5,000 Units SubCUTAneous Q8H Bryson Freeman MD   5,000 Units at 07/10/20 0629    insulin regular (NOVOLIN R, HUMULIN R) injection   SubCUTAneous Q6H Bryson Freeman MD   2 Units at 07/10/20 0633    [START ON 7/11/2020] levETIRAcetam (KEPPRA) 1,000 mg in 0.9% sodium chloride 100 mL IVPB  1,000 mg IntraVENous Q12H Gregorio Freeman MD        pantoprazole (PROTONIX) 40 mg in 0.9% sodium chloride 10 mL injection  40 mg IntraVENous Q24H Gregorio Freeman MD        hydrALAZINE (APRESOLINE) 20 mg/mL injection 10 mg  10 mg IntraVENous Q6H PRN Joseline Freeman MD   10 mg at 07/10/20 0320    dextrose 5% infusion  50 mL/hr IntraVENous CONTINUOUS Boaz Austin MD           Technique: The EEG was recorded on a 32 channel [employer] digital EEG machine. A full electrode headset was applied in accordance with the International 10-20 System of Electrode Placement. All impedances are less than 5000 Ohms. Time locked digital video of the patient was recorded and reviewed as needed for clinical correlation     State of Consciousness: Unresponsive    Background: Mixed delta and theta, poorly organized with no clear alpha rhythm. Sleep: No sleep architecture seen. EKG: Sinus Rhythm    Activation Procedures:  Hyperventilation: Not performed  Photic Stimulation: Not performed     Non-epileptiform abnormalities: Extensive muscle artifact    Epileptiform abnormalities: None    Seizures: None      Interpretation: This EEG is abnormal due to poorly organized low amplitude background consistent with a mild encephalopathy. No seizures or epileptiform activity was seen. The absence of epileptiform activity does not rule out the presence of a seizure disorder. Clinical correlation is indicated.

## 2020-07-10 NOTE — ED NOTES
Nurse suctioned PT for comfort. Vitals remain stable. Travis catheter has put out 300 cc of clear/ yellow urine. Pt repositioned for comfort.

## 2020-07-10 NOTE — ED NOTES
Nurse went to obtain urine specimen from Pt and medicate the pt. MD made aware of pt's current status. MD at bedside. Verbal orders received.

## 2020-07-10 NOTE — ED NOTES
Pt moved to room 7 for intubation. Pt was intubated at appx 2005 by Dr Miguel Butt. Tasha respiratory in room to assist. Richar Rolon RN, Shannon Chen RN, and Ana Duncan [facility] in room to help assist . Medication Amidate and Succinylcholine were given by Richar Rolon RN with verbal order from Dr Miguel Butt with a verbal feedback to Dr Miguel Butt. A liter of Normal Saline was given to help with post-intubation hypotension with verbal order from Dr Miguel Butt. Dr Miguel Butt also gave 3 doses Neoynenohrine to help with hypotension.

## 2020-07-10 NOTE — ED NOTES
Report taken from Carlos Shaw, [address] [facility]. MD at bedside.  Pt sitting up in bed talking to MD.

## 2020-07-10 NOTE — PROCEDURES
CEEG Interim Report    CEEG reviewed through time 8:03 AM on 7/10/2020. Findings:    1) Continued poorly organized background of low amplitude throughout the night with less muscle artifact. 2) No seizures or epileptiform activity seen. Full report to follow.

## 2020-07-10 NOTE — PROGRESS NOTES
Ventilator check complete; patient has a #7.5 ET tube secured at the 25 at the lip. Patient is sedated. Patient is not able to follow commands. Breath sounds are diminished. Trachea is midline, Negative for subcutaneous air, and chest excursion is symmetric. Patient is also Negative for cyanosis. All alarms are set and audible. Resuscitation bag is at the head of the bed. Ventilator Settings  Mode FIO2 Rate Tidal Volume Pressure PEEP I:E Ratio   VC+  30 %   18 450 ml     8 cm H20  1:2      Peak airway pressure: 18 cm H2O   Minute ventilation: 8.14 l/min     ABG: No results for input(s): PH, PCO2, PO2, HCO3 in the last 72 hours.       Marilyn Junior, RT

## 2020-07-10 NOTE — CONSULTS
Consult    Patient: Dwain Garcia MRN: [id number]  SSN: [id number]    YOB: 1945  Age: 77 y.o. Sex: male        Assessment:     1. Seizure disorder    2. Status epilepticus, resolved  Hospital Problems  Never Reviewed          Codes Class Noted POA    DM2 (diabetes mellitus, type 2) ([facility] (Chronic) ICD-10-CM: E11.9  ICD-9-CM: 250.00  7/10/2020 Unknown        Acute kidney insufficiency ICD-10-CM: N28.9  ICD-9-CM: 593.9  7/10/2020 Unknown        Acute respiratory failure with hypoxia (HCC) ICD-10-CM: J96.01  ICD-9-CM: 518.81  7/10/2020 Unknown        Lactic acid increased ICD-10-CM: E87.2  ICD-9-CM: 276.2  7/10/2020 Unknown        Essential hypertension (Chronic) ICD-10-CM: I10  ICD-9-CM: 401.9  7/10/2020 Unknown        * (Principal) Status epilepticus ([facility] ICD-10-CM: G40.901  ICD-9-CM: 345.3  7/9/2020 Unknown              Plan:     Continue video EEG monitoring    Gradually wean propofol and midazolam.  Hope for extubation soon    Increase Keppra to 1500 mg IV every 12 hours    Fosphenytoin 100 mg IV every 8 hours    Depacon 500 mg IV every 8 hours    Check total and free phenytoin level    Check total and free valproic acid level    Consider MRI head once extubated    Subjective:      Dwain Garcia is a 77 y.o. male who is being seen for neurological consultation for management of status epilepticus. Patient originally presented yesterday to [facility] [place] with multiple seizures. He was initially treated with IV Keppra ultimately with a full loading dose of 4200 mg. He received multiple doses of Ativan. Patient required intubation in the emergency department and was transferred to [facility] where he was seen overnight by specialist on call. Patient's been placed on long-term video EEG monitoring. No further seizure activity has been detected on EEG monitoring. Patient has a previous history of epilepsy.   He has a complex neurological and psychiatric history including history of schizophrenia and Parkinson's disease. He is DNR. Review of care everywhere reveals an emergency department encounter at [facility] on April 29, 2019 for seizures. At that time he was noted to have a 3 cm soft tissue density in the left infrahyoid neck. This was a concern for possible malignancy. MRI of the brain was performed at that time it showed no evidence of metastatic disease to the brain. Keppra dose was increased to 1 g p.o. twice daily at that time. Valproic acid level was low at 31    Past Medical History:   Diagnosis Date    Diabetes ([address]     Hypertension      No past surgical history on file. No family history on file.   Social History     Tobacco Use    Smoking status: Former Smoker     Years: 60.00    Smokeless tobacco: Never Used   Substance Use Topics    Alcohol use: Not Currently      Current Facility-Administered Medications   Medication Dose Route Frequency Provider Last Rate Last Dose    amantadine HCL (SYMMETREL) capsule 100 mg  100 mg Oral DAILY Bryson Freeman MD   100 mg at 07/10/20 1038    atorvastatin (LIPITOR) tablet 40 mg  40 mg Oral QHS Stephon Ni MD   Stopped at 07/10/20 0200    lisinopriL (PRINIVIL, ZESTRIL) tablet 10 mg  10 mg Oral DAILY Stephon Ni MD   Stopped at 07/10/20 0900    polyethylene glycol (MIRALAX) packet 17 g  17 g Oral DAILY Stephon Ni MD   Stopped at 07/10/20 0900    propranoloL (INDERAL) tablet 10 mg  10 mg Oral DAILY Stephon Ni MD   Stopped at 07/10/20 0900    tamsulosin (FLOMAX) capsule 0.4 mg  0.4 mg Oral DAILY Stephon Ni MD   Stopped at 07/10/20 0900    propofoL (DIPRIVAN) 10 mg/mL injection  0-50 mcg/kg/min IntraVENous TITRATE Mikal Freeman MD   Stopped at 07/10/20 1010    midazolam in normal saline (VERSED) 1 mg/mL infusion  0-10 mg/hr IntraVENous CONTINUOUS Mikal Freeman MD 4 mL/hr at 07/10/20 1010 4 mg/hr at 07/10/20 1010    sodium chloride (NS) flush 5-40 mL  5-40 mL IntraVENous Q8H Fahad Freeman MD   10 mL at 07/10/20 0629    sodium chloride (NS) flush 5-40 mL  5-40 mL IntraVENous PRN Esperanza Rodrigues MD        heparin (porcine) injection 5,000 Units  5,000 Units SubCUTAneous Q8H Fahad Freeman MD   5,000 Units at 07/10/20 0629    insulin regular (Chalino Cambric R, HUMULIN R) injection   SubCUTAneous Q6H Fahad Freeman MD   2 Units at 07/10/20 3164    pantoprazole (PROTONIX) 40 mg in 0.9% sodium chloride 10 mL injection  40 mg IntraVENous Q24H Esperanza Rodrigues MD   40 mg at 07/10/20 1019    hydrALAZINE (APRESOLINE) 20 mg/mL injection 10 mg  10 mg IntraVENous Q6H PRN Fahad Freeman MD   10 mg at 07/10/20 0320    dextrose 5% infusion  50 mL/hr IntraVENous CONTINUOUS Fahad Freeman MD 50 mL/hr at 07/10/20 1040 50 mL/hr at 07/10/20 1040    NUTRITIONAL SUPPORT ELECTROLYTE PRN ORDERS   Does Not Apply PRN Fahad Freeman MD        potassium phosphate 20 mmol in 0.9% sodium chloride 250 mL infusion   IntraVENous ONCE Esperanza Rodrigues MD        [START ON 7/11/2020] levETIRAcetam (KEPPRA) 1,500 mg in 0.9% sodium chloride 100 mL IVPB  1,500 mg IntraVENous Q12H Kezia ALVARADO MD        fosphenytoin (CEREBYX) 100 mg PE in 0.9% sodium chloride 100 mL IVPB  100 mg PE IntraVENous Q8H Christiano Easton  mL/hr at 07/10/20 1116 100 mg PE at 07/10/20 1116    valproate (DEPACON) 500 mg in 0.9% sodium chloride 50 mL IVPB  500 mg IntraVENous Johnny Lowe MD            No Known Allergies    Review of Systems:  Patient is intubated and cannot provide a review of systems    Objective:     Vitals:    07/10/20 0831 07/10/20 0846 07/10/20 0906 07/10/20 1205   BP: (!) 85/57 (!) 89/56     Pulse: 77 78 76 72   Resp: 18 18 18 18   Temp:       SpO2: 100% 100% 100% 100%   Weight:            Physical Exam:    Patient is intubated  Pupils 3 mm reactive to light  No motor response to pain  Plantar responses flexor bilaterally  Recent Results (from the past 24 hour(s))   CBC WITH AUTOMATED DIFF Collection Time: 07/09/20  6:09 PM   Result Value Ref Range    WBC 11.9 (H) 4.3 - 11.1 K/uL    RBC 3.98 (L) 4.23 - 5.6 M/uL    HGB 12.6 (L) 13.6 - 17.2 g/dL    HCT 38.4 (L) 41.1 - 50.3 %    MCV 96.5 79.6 - 97.8 FL    MCH 31.7 26.1 - 32.9 PG    MCHC 32.8 31.4 - 35.0 g/dL    RDW 13.1 11.9 - 14.6 %    PLATELET 002 856 - 009 K/uL    MPV 10.4 9.4 - 12.3 FL    ABSOLUTE NRBC 0.00 0.0 - 0.2 K/uL    DF AUTOMATED      NEUTROPHILS 77 43 - 78 %    LYMPHOCYTES 15 13 - 44 %    MONOCYTES 7 4.0 - 12.0 %    EOSINOPHILS 2 0.5 - 7.8 %    BASOPHILS 0 0.0 - 2.0 %    IMMATURE GRANULOCYTES 0 0.0 - 5.0 %    ABS. NEUTROPHILS 9.1 (H) 1.7 - 8.2 K/UL    ABS. LYMPHOCYTES 1.7 0.5 - 4.6 K/UL    ABS. MONOCYTES 0.8 0.1 - 1.3 K/UL    ABS. EOSINOPHILS 0.2 0.0 - 0.8 K/UL    ABS. BASOPHILS 0.1 0.0 - 0.2 K/UL    ABS. IMM. GRANS. 0.0 0.0 - 0.5 K/UL   METABOLIC PANEL, COMPREHENSIVE    Collection Time: 07/09/20  6:09 PM   Result Value Ref Range    Sodium 138 136 - 145 mmol/L    Potassium 4.8 3.5 - 5.1 mmol/L    Chloride 107 98 - 107 mmol/L    CO2 21 21 - 32 mmol/L    Anion gap 10 7 - 16 mmol/L    Glucose 218 (H) 65 - 100 mg/dL    BUN 24 (H) 8 - 23 MG/DL    Creatinine 1.27 0.8 - 1.5 MG/DL    GFR est AA >60 >60 ml/min/1.73m2    GFR est non-AA 59 (L) >60 ml/min/1.73m2    Calcium 9.2 8.3 - 10.4 MG/DL    Bilirubin, total 0.4 0.2 - 1.1 MG/DL    ALT (SGPT) 26 12 - 65 U/L    AST (SGOT) 34 15 - 37 U/L    Alk.  phosphatase 54 50 - 136 U/L    Protein, total 7.2 6.3 - 8.2 g/dL    Albumin 3.7 3.2 - 4.6 g/dL    Globulin 3.5 2.3 - 3.5 g/dL    A-G Ratio 1.1 (L) 1.2 - 3.5     ETHYL ALCOHOL    Collection Time: 07/09/20  6:09 PM   Result Value Ref Range    ALCOHOL(ETHYL),SERUM <3 MG/DL   MAGNESIUM    Collection Time: 07/09/20  6:09 PM   Result Value Ref Range    Magnesium 2.1 1.8 - 2.4 mg/dL   POC G3    Collection Time: 07/09/20  7:47 PM   Result Value Ref Range    Device: Non rebreather      pH (POC) 7.15 (LL) 7.35 - 7.45      pCO2 (POC) 37.9 35 - 45 MMHG    pO2 (POC) 119 (H) 75 - 100 MMHG    HCO3 (POC) 13.2 (L) 22 - 26 MMOL/L    sO2 (POC) 97 95 - 98 %    Base deficit (POC) 15 mmol/L    Allens test (POC) YES      Site RIGHT RADIAL      Specimen type (POC) ARTERIAL      Performed by [email or web address]     CO2, POC 14 MMOL/L    Respiratory comment: PhysicianNotified     COLLECT TIME 1,945     EKG, 12 LEAD, INITIAL    Collection Time: 07/09/20  8:43 PM   Result Value Ref Range    Ventricular Rate 88 BPM    Atrial Rate 88 BPM    P-R Interval 174 ms    QRS Duration 90 ms    Q-T Interval 388 ms    QTC Calculation (Bezet) 469 ms    Calculated P Axis 75 degrees    Calculated R Axis 74 degrees    Calculated T Axis -22 degrees    Diagnosis       Normal sinus rhythm  Abnormal QRS-T angle, consider primary T wave abnormality  Abnormal ECG  No previous ECGs available  Confirmed by SIRIA TORRES (), Izabella Quigley ([id number]) on 7/10/2020 7:03:19 AM     POC G3    Collection Time: 07/09/20  9:01 PM   Result Value Ref Range    Device: VENT      FIO2 (POC) 50 %    pH (POC) 7.35 7.35 - 7.45      pCO2 (POC) 30.2 (L) 35 - 45 MMHG    pO2 (POC) 106 (H) 75 - 100 MMHG    HCO3 (POC) 16.8 (L) 22 - 26 MMOL/L    sO2 (POC) 98 95 - 98 %    Base deficit (POC) 8 mmol/L    Mode ASSIST CONTROL      Tidal volume 500 ml    Set Rate 22 bpm    PEEP/CPAP (POC) 8 cmH2O    Allens test (POC) YES      Inspiratory Time 0.9 sec    Site RIGHT RADIAL      Specimen type (POC) ARTERIAL      Performed by [email or web address]     CO2, POC 18 MMOL/L    Respiratory comment: PhysicianNotified     Exhaled minute volume 11.00 L/min    COLLECT TIME 2,100     LACTIC ACID    Collection Time: 07/09/20  9:03 PM   Result Value Ref Range    Lactic acid 5.7 (HH) 0.4 - 2.0 MMOL/L   URINALYSIS W/ RFLX MICROSCOPIC    Collection Time: 07/09/20  9:08 PM   Result Value Ref Range    Color YELLOW      Appearance CLEAR      Specific gravity 1.021 1.001 - 1.023      pH (UA) 5.5 5.0 - 9.0      Protein 100 (A) NEG mg/dL    Glucose 100 mg/dL    Ketone TRACE (A) NEG mg/dL Bilirubin Negative NEG      Blood SMALL (A) NEG      Urobilinogen 0.2 0.2 - 1.0 EU/dL    Nitrites Negative NEG      Leukocyte Esterase Negative NEG      WBC 5-10 0 /hpf    RBC 3-5 0 /hpf    Epithelial cells 3-5 0 /hpf    Bacteria 0 0 /hpf    Casts 20-50 0 /lpf   DRUG SCREEN, URINE    Collection Time: 07/09/20  9:08 PM   Result Value Ref Range    PCP(PHENCYCLIDINE) Negative      BENZODIAZEPINES Negative      COCAINE Negative      AMPHETAMINES Negative      METHADONE Negative      THC (TH-CANNABINOL) Negative      OPIATES Negative      BARBITURATES Negative     GLUCOSE, POC    Collection Time: 07/09/20  9:17 PM   Result Value Ref Range    Glucose (POC) 242 (H) 65 - 095 mg/dL   METABOLIC PANEL, BASIC    Collection Time: 07/09/20 10:56 PM   Result Value Ref Range    Sodium 139 136 - 145 mmol/L    Potassium 5.4 (H) 3.5 - 5.1 mmol/L    Chloride 111 (H) 98 - 107 mmol/L    CO2 18 (L) 21 - 32 mmol/L    Anion gap 10 7 - 16 mmol/L    Glucose 224 (H) 65 - 100 mg/dL    BUN 24 (H) 8 - 23 MG/DL    Creatinine 1.27 0.8 - 1.5 MG/DL    GFR est AA >60 >60 ml/min/1.73m2    GFR est non-AA 59 (L) >60 ml/min/1.73m2    Calcium 7.9 (L) 8.3 - 10.4 MG/DL   CK    Collection Time: 07/09/20 10:56 PM   Result Value Ref Range     21 - 215 U/L   GLUCOSE, POC    Collection Time: 07/10/20  2:05 AM   Result Value Ref Range    Glucose (POC) 169 (H) 65 - 100 mg/dL   POC G3    Collection Time: 07/10/20  3:06 AM   Result Value Ref Range    Device: VENT      FIO2 (POC) 50 %    pH (POC) 7.28 (L) 7.35 - 7.45      pCO2 (POC) 37.1 35 - 45 MMHG    pO2 (POC) 159 (H) 75 - 100 MMHG    HCO3 (POC) 17.4 (L) 22 - 26 MMOL/L    sO2 (POC) 99 (H) 95 - 98 %    Base deficit (POC) 9 mmol/L    Mode Pressure regulated volume control      Tidal volume 450 ml    Set Rate 18 bpm    PEEP/CPAP (POC) 8 cmH2O    Allens test (POC) YES      Inspiratory Time 0.85 sec    Site RIGHT RADIAL      Specimen type (POC) ARTERIAL      Performed by Jill     CO2, POC 19 MMOL/L Critical value read back 03:04     Respiratory comment: NurseNotified     Exhaled minute volume 10.30 L/min    COLLECT TIME 300     LACTIC ACID    Collection Time: 07/10/20  3:40 AM   Result Value Ref Range    Lactic acid 2.7 (HH) 0.4 - 2.0 MMOL/L   METABOLIC PANEL, COMPREHENSIVE    Collection Time: 07/10/20  3:45 AM   Result Value Ref Range    Sodium 148 (H) 136 - 145 mmol/L    Potassium 3.8 3.5 - 5.1 mmol/L    Chloride 120 (H) 98 - 107 mmol/L    CO2 14 (L) 21 - 32 mmol/L    Anion gap 14 7 - 16 mmol/L    Glucose 171 (H) 65 - 100 mg/dL    BUN 19 8 - 23 MG/DL    Creatinine 0.89 0.8 - 1.5 MG/DL    GFR est AA >60 >60 ml/min/1.73m2    GFR est non-AA >60 >60 ml/min/1.73m2    Calcium 6.8 (L) 8.3 - 10.4 MG/DL    Bilirubin, total 0.3 0.2 - 1.1 MG/DL    ALT (SGPT) 25 12 - 65 U/L    AST (SGOT) 28 15 - 37 U/L    Alk.  phosphatase 46 (L) 50 - 136 U/L    Protein, total 5.8 (L) 6.3 - 8.2 g/dL    Albumin 2.6 (L) 3.2 - 4.6 g/dL    Globulin 3.2 2.3 - 3.5 g/dL    A-G Ratio 0.8 (L) 1.2 - 3.5     CBC W/O DIFF    Collection Time: 07/10/20  3:45 AM   Result Value Ref Range    WBC 14.6 (H) 4.3 - 11.1 K/uL    RBC 3.94 (L) 4.23 - 5.6 M/uL    HGB 12.7 (L) 13.6 - 17.2 g/dL    HCT 39.8 (L) 41.1 - 50.3 %    .0 (H) 79.6 - 97.8 FL    MCH 32.2 26.1 - 32.9 PG    MCHC 31.9 31.4 - 35.0 g/dL    RDW 13.2 11.9 - 14.6 %    PLATELET 907 129 - 714 K/uL    MPV 10.1 9.4 - 12.3 FL    ABSOLUTE NRBC 0.00 0.0 - 0.2 K/uL   PROTHROMBIN TIME + INR    Collection Time: 07/10/20  3:45 AM   Result Value Ref Range    Prothrombin time 13.8 12.0 - 14.7 sec    INR 1.0     MAGNESIUM    Collection Time: 07/10/20  3:45 AM   Result Value Ref Range    Magnesium 1.7 (L) 1.8 - 2.4 mg/dL   PHOSPHORUS    Collection Time: 07/10/20  3:45 AM   Result Value Ref Range    Phosphorus 2.1 (L) 2.3 - 3.7 MG/DL   GLUCOSE, POC    Collection Time: 07/10/20  6:33 AM   Result Value Ref Range    Glucose (POC) 166 (H) 65 - 100 mg/dL       No results found for: CHOL, CHOLPOCT, CHOLX, CHLST, CHOLV, HDL, HDLPOC, HDLP, LDL, LDLCPOC, LDLC, DLDLP, VLDLC, VLDL, TGLX, TRIGL, TRIGP, TGLPOCT, CHHD, CHHDX     No results found for: HBA1C, HGBE8, KGL1VIEK, PJT7TCDN     CT Results (most recent):  Results from Hospital Encounter encounter on 07/09/20   CT HEAD WO CONT    Narrative CT HEAD WITHOUT CONTRAST     HISTORY:  Seizure. COMPARISON: 9/7/2019    TECHNIQUE: Axial imaging was performed without intravenous contrast utilizing  5mm slice thickness. Sagittal and coronal reformats were performed. Radiation  dose reduction techniques were used for this study. Our CT scanner uses one or  all of the following:    Automated exposure control, adjustment of the MAS or KUB according to patient's  size and iterative reconstruction. FINDINGS:        *BRAIN:      -  There are no early signs of territorial or lacunar infarction by CT.     -  Symmetric supratentorial atrophy. -  For patient's age, the scattered areas of white matter hypodensities may  represent a chronic small vessel white matter ischemia. However this is  nonspecific. *VISUALIZED PARANASAL SINUSES: Well aerated. *MASTOIDS:  Clear. *CALVARIUM AND SCALP: Unremarkable. Impression IMPRESSION:    No acute abnormalities. Date of Dictation: 7/9/2020 8:43 PM         Results for orders placed or performed during the hospital encounter of 07/09/20   EKG, 12 LEAD, INITIAL   Result Value Ref Range    Ventricular Rate 88 BPM    Atrial Rate 88 BPM    P-R Interval 174 ms    QRS Duration 90 ms    Q-T Interval 388 ms    QTC Calculation (Bezet) 469 ms    Calculated P Axis 75 degrees    Calculated R Axis 74 degrees    Calculated T Axis -22 degrees    Diagnosis       Normal sinus rhythm  Abnormal QRS-T angle, consider primary T wave abnormality  Abnormal ECG  No previous ECGs available  Confirmed by Gertrudis Bernal MD ()Bimal ([id number]) on 7/10/2020 7:03:19 AM          MRI Results (most recent):  No results found for this or any previous visit.      [address] Results (most recent):  No results found for this or any previous visit. Most recent CTA  Results from [facility] encounter on 07/09/20   CT HEAD WO CONT    Narrative CT HEAD WITHOUT CONTRAST     HISTORY:  Seizure. COMPARISON: 9/7/2019    TECHNIQUE: Axial imaging was performed without intravenous contrast utilizing  5mm slice thickness. Sagittal and coronal reformats were performed. Radiation  dose reduction techniques were used for this study. Our CT scanner uses one or  all of the following:    Automated exposure control, adjustment of the MAS or KUB according to patient's  size and iterative reconstruction. FINDINGS:        *BRAIN:      -  There are no early signs of territorial or lacunar infarction by CT.     -  Symmetric supratentorial atrophy. -  For patient's age, the scattered areas of white matter hypodensities may  represent a chronic small vessel white matter ischemia. However this is  nonspecific. *VISUALIZED PARANASAL SINUSES: Well aerated. *MASTOIDS:  Clear. *CALVARIUM AND SCALP: Unremarkable. Impression IMPRESSION:    No acute abnormalities. Date of Dictation: 7/9/2020 8:43 PM         Most recent MRI  No results found for this or any previous visit.         Signed By: Veronica Kohler MD     July 10, 2020

## 2020-07-10 NOTE — PROGRESS NOTES
Ventilator check complete; patient has a #7.5 ET tube secured at the 25 at the lip. Patient is sedated. Patient is not able to follow commands. Breath sounds are clear and diminished. Trachea is midline, Negative for subcutaneous air, and chest excursion is symmetric. Patient is also Negative for cyanosis and is Negative for pitting edema. All alarms are set and audible. Resuscitation bag is at the head of the bed. Ventilator Settings  Mode FIO2 Rate Tidal Volume Pressure PEEP I:E Ratio   VC+  51 %   18 0.45 ml     8 cm H20         Peak airway pressure: 19 cm H2O   Minute ventilation: 7.01 l/min     ABG: No results for input(s): PH, PCO2, PO2, HCO3 in the last 72 hours.       Abebe Garcia

## 2020-07-10 NOTE — ED NOTES
Pt being transported to [facility]. PT hooked up to EMS monitoring device. Current vitals 160/95 hr 80 rr 18 o2 100 on vent settings. Nurse riding along with patient in EMS.

## 2020-07-10 NOTE — ED NOTES
TRANSFER - OUT REPORT:    Verbal report given to CARMEN Marroquin (name) on Sophie Farrell  being transferred to ICU room number 3108(unit) for routine progression of care       Report consisted of patients Situation, Background, Assessment and   Recommendations(SBAR). Information from the following report(s) SBAR, Kardex, ED Summary, Procedure Summary, MAR, Recent Results and Cardiac Rhythm Normal Sinus  was reviewed with the receiving nurse. Lines:   Peripheral IV 07/09/20 Right Antecubital (Active)       Peripheral IV 07/09/20 Right Hand (Active)       Peripheral IV 07/09/20 Left (Active)        Opportunity for questions and clarification was provided.       Patient transported with:  Registered Nurse, IV Medications, Vent, Cardiac Monitor

## 2020-07-10 NOTE — PROGRESS NOTES
Pt chart reviewed and pt discussed in am IDR. Admitted from [facility] s/p status epileptics, currently intubated/vent. Versed gtt. Call to Rach [facility] [employer], pt's brother currently on tractor. Aware of hospital admission. Unsure of where pt lives, but knew facility. Able to confirm admission from [facility] after speaking with Kalen [phone]. States pt has been in [place] about 4-5 years. In w/c mostly, can transfer self. Feeds and dressing self, but needs assist with bathing. Goes to [place] for PCP and gets Rx from there. Patricia Liphu is emergency contact per facility. Aware CM will follow for assist and d/c POC when stable. Care Management Interventions  [facility]  Mode of Transport at Discharge: Other (see comment)  Transition of Care Consult (CM Consult): Assisted Living([facility])  Current Support Network: Assisted Living(brother, Patricia Hahn -[phone]/CFSB have sister, but Patricia Lipoma takes care of pt.  [facility] -[phone]-)  Confirm Follow Up Transport: Other (see comment)  Freedom of Choice List was Provided with Basic Dialogue that Supports the Patient's Individualized Plan of Care/Goals, Treatment Preferences and Shares the Quality Data Associated with the Providers?: Yes  [employer] Information Provided?: (MCR/supplemental/gets RX from [place])  Discharge Location  Discharge Placement: Unable to determine at this time

## 2020-07-11 ENCOUNTER — APPOINTMENT (OUTPATIENT)
Dept: GENERAL RADIOLOGY | Age: 75
DRG: 100 | End: 2020-07-11
Attending: INTERNAL MEDICINE
Payer: MEDICARE

## 2020-07-11 ENCOUNTER — APPOINTMENT (OUTPATIENT)
Dept: CT IMAGING | Age: 75
DRG: 100 | End: 2020-07-11
Attending: INTERNAL MEDICINE
Payer: MEDICARE

## 2020-07-11 LAB
ALBUMIN SERPL-MCNC: 2.4 G/DL (ref 3.2–4.6)
ALBUMIN/GLOB SERPL: 0.8 {RATIO} (ref 1.2–3.5)
ALP SERPL-CCNC: 47 U/L (ref 50–136)
ALT SERPL-CCNC: 24 U/L (ref 12–65)
ANION GAP SERPL CALC-SCNC: 7 MMOL/L (ref 7–16)
ARTERIAL PATENCY WRIST A: YES
ARTERIAL PATENCY WRIST A: YES
AST SERPL-CCNC: 27 U/L (ref 15–37)
BASE DEFICIT BLD-SCNC: 6 MMOL/L
BASE DEFICIT BLD-SCNC: 7 MMOL/L
BASOPHILS # BLD: 0 K/UL (ref 0–0.2)
BASOPHILS NFR BLD: 0 % (ref 0–2)
BDY SITE: ABNORMAL
BDY SITE: ABNORMAL
BILIRUB SERPL-MCNC: 0.4 MG/DL (ref 0.2–1.1)
BUN SERPL-MCNC: 18 MG/DL (ref 8–23)
CALCIUM SERPL-MCNC: 7.9 MG/DL (ref 8.3–10.4)
CHLORIDE SERPL-SCNC: 112 MMOL/L (ref 98–107)
CO2 BLD-SCNC: 18 MMOL/L
CO2 BLD-SCNC: 19 MMOL/L
CO2 SERPL-SCNC: 23 MMOL/L (ref 21–32)
COLLECT TIME,HTIME: 1550
COLLECT TIME,HTIME: 340
CREAT SERPL-MCNC: 1.15 MG/DL (ref 0.8–1.5)
DIFFERENTIAL METHOD BLD: ABNORMAL
EOSINOPHIL # BLD: 0 K/UL (ref 0–0.8)
EOSINOPHIL NFR BLD: 0 % (ref 0.5–7.8)
ERYTHROCYTE [DISTWIDTH] IN BLOOD BY AUTOMATED COUNT: 13.6 % (ref 11.9–14.6)
ERYTHROCYTE [DISTWIDTH] IN BLOOD BY AUTOMATED COUNT: 13.7 % (ref 11.9–14.6)
EXHALED MINUTE VOLUME, VE: 9.8 L/MIN
FLOW RATE ISTAT,IFRATE: 2 L/MIN
GAS FLOW.O2 O2 DELIVERY SYS: ABNORMAL L/MIN
GAS FLOW.O2 O2 DELIVERY SYS: ABNORMAL L/MIN
GAS FLOW.O2 SETTING OXYMISER: 18 BPM
GLOBULIN SER CALC-MCNC: 3.2 G/DL (ref 2.3–3.5)
GLUCOSE BLD STRIP.AUTO-MCNC: 133 MG/DL (ref 65–100)
GLUCOSE BLD STRIP.AUTO-MCNC: 135 MG/DL (ref 65–100)
GLUCOSE BLD STRIP.AUTO-MCNC: 150 MG/DL (ref 65–100)
GLUCOSE BLD STRIP.AUTO-MCNC: 153 MG/DL (ref 65–100)
GLUCOSE BLD STRIP.AUTO-MCNC: 174 MG/DL (ref 65–100)
GLUCOSE SERPL-MCNC: 139 MG/DL (ref 65–100)
HCO3 BLD-SCNC: 17 MMOL/L (ref 22–26)
HCO3 BLD-SCNC: 18.2 MMOL/L (ref 22–26)
HCT VFR BLD AUTO: 36.8 % (ref 41.1–50.3)
HCT VFR BLD AUTO: 37 % (ref 41.1–50.3)
HGB BLD-MCNC: 11.4 G/DL (ref 13.6–17.2)
HGB BLD-MCNC: 11.5 G/DL (ref 13.6–17.2)
IMM GRANULOCYTES # BLD AUTO: 0.1 K/UL (ref 0–0.5)
IMM GRANULOCYTES NFR BLD AUTO: 1 % (ref 0–5)
INR PPP: 1.1
INSPIRATION.DURATION SETTING TIME VENT: 0.85 SEC
LYMPHOCYTES # BLD: 2 K/UL (ref 0.5–4.6)
LYMPHOCYTES NFR BLD: 11 % (ref 13–44)
MAGNESIUM SERPL-MCNC: 2.4 MG/DL (ref 1.8–2.4)
MCH RBC QN AUTO: 31 PG (ref 26.1–32.9)
MCH RBC QN AUTO: 31.3 PG (ref 26.1–32.9)
MCHC RBC AUTO-ENTMCNC: 31 G/DL (ref 31.4–35)
MCHC RBC AUTO-ENTMCNC: 31.1 G/DL (ref 31.4–35)
MCV RBC AUTO: 100 FL (ref 79.6–97.8)
MCV RBC AUTO: 100.8 FL (ref 79.6–97.8)
MONOCYTES # BLD: 1.3 K/UL (ref 0.1–1.3)
MONOCYTES NFR BLD: 7 % (ref 4–12)
NEUTS SEG # BLD: 15.2 K/UL (ref 1.7–8.2)
NEUTS SEG NFR BLD: 82 % (ref 43–78)
NRBC # BLD: 0 K/UL (ref 0–0.2)
NRBC # BLD: 0 K/UL (ref 0–0.2)
O2/TOTAL GAS SETTING VFR VENT: 30 %
PCO2 BLD: 29 MMHG (ref 35–45)
PCO2 BLD: 30.2 MMHG (ref 35–45)
PEEP RESPIRATORY: 8 CMH2O
PH BLD: 7.38 [PH] (ref 7.35–7.45)
PH BLD: 7.39 [PH] (ref 7.35–7.45)
PHOSPHATE SERPL-MCNC: 3.1 MG/DL (ref 2.3–3.7)
PLATELET # BLD AUTO: 163 K/UL (ref 150–450)
PLATELET # BLD AUTO: 171 K/UL (ref 150–450)
PMV BLD AUTO: 10.1 FL (ref 9.4–12.3)
PMV BLD AUTO: 10.4 FL (ref 9.4–12.3)
PO2 BLD: 61 MMHG (ref 75–100)
PO2 BLD: 76 MMHG (ref 75–100)
POTASSIUM SERPL-SCNC: 4 MMOL/L (ref 3.5–5.1)
PROT SERPL-MCNC: 5.6 G/DL (ref 6.3–8.2)
PROTHROMBIN TIME: 14.2 SEC (ref 12–14.7)
RBC # BLD AUTO: 3.67 M/UL (ref 4.23–5.6)
RBC # BLD AUTO: 3.68 M/UL (ref 4.23–5.6)
SAO2 % BLD: 91 % (ref 95–98)
SAO2 % BLD: 95 % (ref 95–98)
SERVICE CMNT-IMP: ABNORMAL
SODIUM SERPL-SCNC: 142 MMOL/L (ref 136–145)
SPECIMEN TYPE: ABNORMAL
SPECIMEN TYPE: ABNORMAL
VENTILATION MODE VENT: ABNORMAL
VT SETTING VENT: 450 ML
WBC # BLD AUTO: 13.6 K/UL (ref 4.3–11.1)
WBC # BLD AUTO: 18.6 K/UL (ref 4.3–11.1)

## 2020-07-11 PROCEDURE — 84145 PROCALCITONIN (PCT): CPT

## 2020-07-11 PROCEDURE — 85025 COMPLETE CBC W/AUTO DIFF WBC: CPT

## 2020-07-11 PROCEDURE — 74011636320 HC RX REV CODE- 636/320: Performed by: INTERNAL MEDICINE

## 2020-07-11 PROCEDURE — 92610 EVALUATE SWALLOWING FUNCTION: CPT

## 2020-07-11 PROCEDURE — 84484 ASSAY OF TROPONIN QUANT: CPT

## 2020-07-11 PROCEDURE — 0042T CT PERF W CBF: CPT

## 2020-07-11 PROCEDURE — 65610000001 HC ROOM ICU GENERAL

## 2020-07-11 PROCEDURE — 70450 CT HEAD/BRAIN W/O DYE: CPT

## 2020-07-11 PROCEDURE — 74011250637 HC RX REV CODE- 250/637: Performed by: INTERNAL MEDICINE

## 2020-07-11 PROCEDURE — 82962 GLUCOSE BLOOD TEST: CPT

## 2020-07-11 PROCEDURE — 85027 COMPLETE CBC AUTOMATED: CPT

## 2020-07-11 PROCEDURE — 74011636637 HC RX REV CODE- 636/637: Performed by: INTERNAL MEDICINE

## 2020-07-11 PROCEDURE — 80053 COMPREHEN METABOLIC PANEL: CPT

## 2020-07-11 PROCEDURE — 36415 COLL VENOUS BLD VENIPUNCTURE: CPT

## 2020-07-11 PROCEDURE — 82550 ASSAY OF CK (CPK): CPT

## 2020-07-11 PROCEDURE — 74011250636 HC RX REV CODE- 250/636: Performed by: PSYCHIATRY & NEUROLOGY

## 2020-07-11 PROCEDURE — 74011250636 HC RX REV CODE- 250/636: Performed by: INTERNAL MEDICINE

## 2020-07-11 PROCEDURE — 82803 BLOOD GASES ANY COMBINATION: CPT

## 2020-07-11 PROCEDURE — 83735 ASSAY OF MAGNESIUM: CPT

## 2020-07-11 PROCEDURE — 74011000250 HC RX REV CODE- 250: Performed by: PSYCHIATRY & NEUROLOGY

## 2020-07-11 PROCEDURE — 74011000258 HC RX REV CODE- 258: Performed by: PSYCHIATRY & NEUROLOGY

## 2020-07-11 PROCEDURE — 84100 ASSAY OF PHOSPHORUS: CPT

## 2020-07-11 PROCEDURE — 85610 PROTHROMBIN TIME: CPT

## 2020-07-11 PROCEDURE — 94003 VENT MGMT INPAT SUBQ DAY: CPT

## 2020-07-11 PROCEDURE — 36600 WITHDRAWAL OF ARTERIAL BLOOD: CPT

## 2020-07-11 PROCEDURE — 99232 SBSQ HOSP IP/OBS MODERATE 35: CPT | Performed by: PSYCHIATRY & NEUROLOGY

## 2020-07-11 PROCEDURE — 77010033678 HC OXYGEN DAILY

## 2020-07-11 PROCEDURE — 71045 X-RAY EXAM CHEST 1 VIEW: CPT

## 2020-07-11 PROCEDURE — 99233 SBSQ HOSP IP/OBS HIGH 50: CPT | Performed by: INTERNAL MEDICINE

## 2020-07-11 PROCEDURE — 74011000258 HC RX REV CODE- 258: Performed by: INTERNAL MEDICINE

## 2020-07-11 PROCEDURE — 70496 CT ANGIOGRAPHY HEAD: CPT

## 2020-07-11 PROCEDURE — C9113 INJ PANTOPRAZOLE SODIUM, VIA: HCPCS | Performed by: INTERNAL MEDICINE

## 2020-07-11 PROCEDURE — 74011000250 HC RX REV CODE- 250: Performed by: INTERNAL MEDICINE

## 2020-07-11 RX ORDER — AMANTADINE HYDROCHLORIDE 100 MG/1
100 CAPSULE, GELATIN COATED ORAL DAILY
Status: DISCONTINUED | OUTPATIENT
Start: 2020-07-11 | End: 2020-07-13

## 2020-07-11 RX ORDER — LISINOPRIL 5 MG/1
10 TABLET ORAL DAILY
Status: DISCONTINUED | OUTPATIENT
Start: 2020-07-11 | End: 2020-07-14

## 2020-07-11 RX ORDER — POLYETHYLENE GLYCOL 3350 17 G/17G
17 POWDER, FOR SOLUTION ORAL DAILY
Status: DISCONTINUED | OUTPATIENT
Start: 2020-07-11 | End: 2020-07-14

## 2020-07-11 RX ORDER — SODIUM CHLORIDE 0.9 % (FLUSH) 0.9 %
5-10 SYRINGE (ML) INJECTION
Status: COMPLETED | OUTPATIENT
Start: 2020-07-11 | End: 2020-07-11

## 2020-07-11 RX ORDER — PROPRANOLOL HYDROCHLORIDE 10 MG/1
10 TABLET ORAL DAILY
Status: DISCONTINUED | OUTPATIENT
Start: 2020-07-11 | End: 2020-07-14

## 2020-07-11 RX ORDER — ATORVASTATIN CALCIUM 40 MG/1
40 TABLET, FILM COATED ORAL
Status: DISCONTINUED | OUTPATIENT
Start: 2020-07-11 | End: 2020-07-14

## 2020-07-11 RX ADMIN — Medication 10 ML: at 14:38

## 2020-07-11 RX ADMIN — SODIUM CHLORIDE 40 MG: 9 INJECTION, SOLUTION INTRAMUSCULAR; INTRAVENOUS; SUBCUTANEOUS at 09:46

## 2020-07-11 RX ADMIN — SODIUM CHLORIDE 2 MG/HR: 900 INJECTION, SOLUTION INTRAVENOUS at 00:48

## 2020-07-11 RX ADMIN — LISINOPRIL 10 MG: 5 TABLET ORAL at 09:37

## 2020-07-11 RX ADMIN — Medication 10 ML: at 21:08

## 2020-07-11 RX ADMIN — FOSPHENYTOIN 100 MG PE: 50 INJECTION INTRAMUSCULAR; INTRAVENOUS at 14:32

## 2020-07-11 RX ADMIN — HEPARIN SODIUM 5000 UNITS: 5000 INJECTION INTRAVENOUS; SUBCUTANEOUS at 05:34

## 2020-07-11 RX ADMIN — HUMAN INSULIN 2 UNITS: 100 INJECTION, SOLUTION SUBCUTANEOUS at 12:00

## 2020-07-11 RX ADMIN — DEXTROSE MONOHYDRATE 50 ML/HR: 5 INJECTION, SOLUTION INTRAVENOUS at 09:53

## 2020-07-11 RX ADMIN — POLYETHYLENE GLYCOL 3350 17 G: 17 POWDER, FOR SOLUTION ORAL at 09:37

## 2020-07-11 RX ADMIN — HEPARIN SODIUM 5000 UNITS: 5000 INJECTION INTRAVENOUS; SUBCUTANEOUS at 21:25

## 2020-07-11 RX ADMIN — HEPARIN SODIUM 5000 UNITS: 5000 INJECTION INTRAVENOUS; SUBCUTANEOUS at 14:32

## 2020-07-11 RX ADMIN — VALPROATE SODIUM 500 MG: 100 INJECTION, SOLUTION INTRAVENOUS at 05:44

## 2020-07-11 RX ADMIN — LEVETIRACETAM 1500 MG: 500 INJECTION, SOLUTION INTRAVENOUS at 09:28

## 2020-07-11 RX ADMIN — IOPAMIDOL 100 ML: 755 INJECTION, SOLUTION INTRAVENOUS at 15:32

## 2020-07-11 RX ADMIN — SODIUM CHLORIDE 100 ML: 900 INJECTION, SOLUTION INTRAVENOUS at 15:32

## 2020-07-11 RX ADMIN — HUMAN INSULIN 2 UNITS: 100 INJECTION, SOLUTION SUBCUTANEOUS at 17:58

## 2020-07-11 RX ADMIN — FOSPHENYTOIN 100 MG PE: 50 INJECTION INTRAMUSCULAR; INTRAVENOUS at 05:34

## 2020-07-11 RX ADMIN — LEVETIRACETAM 1500 MG: 500 INJECTION, SOLUTION INTRAVENOUS at 21:07

## 2020-07-11 RX ADMIN — Medication 10 ML: at 05:17

## 2020-07-11 RX ADMIN — VALPROATE SODIUM 500 MG: 100 INJECTION, SOLUTION INTRAVENOUS at 14:32

## 2020-07-11 RX ADMIN — PROPRANOLOL HYDROCHLORIDE 10 MG: 10 TABLET ORAL at 09:37

## 2020-07-11 RX ADMIN — Medication 10 ML: at 15:32

## 2020-07-11 RX ADMIN — AMANTADINE HYDROCHLORIDE 100 MG: 100 CAPSULE ORAL at 09:37

## 2020-07-11 NOTE — PROCEDURES
[facility] Neurology   Continuous EEG Monitoring Report      DATE:  07/10/2020-07/11/2020  Time Start: 0159 Time End: 0109    EEG Number:      Indication:  77year old male with history of seizures who presented to the ER with multiple seizures.     Medications:   Current Facility-Administered Medications   Medication Dose Route Frequency Provider Last Rate Last Dose    amantadine HCL (SYMMETREL) capsule 100 mg  100 mg Per NG tube DAILY Unruly Vickers MD        atorvastatin (LIPITOR) tablet 40 mg  40 mg Per NG tube QHS Unruly Vickers MD        lisinopriL (PRINIVIL, ZESTRIL) tablet 10 mg  10 mg Per NG tube DAILY Unruly Vickers MD        polyethylene glycol (MIRALAX) packet 17 g  17 g Per NG tube DAILY Unruly Vickers MD        propranoloL (INDERAL) tablet 10 mg  10 mg Per NG tube DAILY Unruly Vickers MD        tamsulosin (FLOMAX) capsule 0.4 mg  0.4 mg Oral DAILY Carolyne Freeman MD   Stopped at 07/10/20 0900    midazolam in normal saline (VERSED) 1 mg/mL infusion  0-10 mg/hr IntraVENous CONTINUOUS Carolyne Freeman MD   Stopped at 07/11/20 0537    sodium chloride (NS) flush 5-40 mL  5-40 mL IntraVENous Q8H Bryson Freeman MD   10 mL at 07/11/20 0517    sodium chloride (NS) flush 5-40 mL  5-40 mL IntraVENous PRN Carolyne Freeman MD        heparin (porcine) injection 5,000 Units  5,000 Units SubCUTAneous Q8H Carolyne Freeman MD   5,000 Units at 07/11/20 0534    insulin regular (NOVOLIN R, HUMULIN R) injection   SubCUTAneous Q6H Bryson Freeman MD   Stopped at 07/11/20 0000    pantoprazole (PROTONIX) 40 mg in 0.9% sodium chloride 10 mL injection  40 mg IntraVENous Q24H Erik Simpson MD   40 mg at 07/10/20 1019    hydrALAZINE (APRESOLINE) 20 mg/mL injection 10 mg  10 mg IntraVENous Q6H PRN Carolyne Freeman MD   10 mg at 07/10/20 0320    dextrose 5% infusion  50 mL/hr IntraVENous CONTINUOUS Carolyne Freeman MD 50 mL/hr at 07/10/20 1040 50 mL/hr at 07/10/20 1040    NUTRITIONAL SUPPORT ELECTROLYTE PRN ORDERS   Does Not Apply PRN Joseline Freeman MD        levETIRAcetam (KEPPRA) 1,500 mg in 0.9% sodium chloride 100 mL IVPB  1,500 mg IntraVENous Q12H Paris ALVARADO MD        fosphenytoin (CEREBYX) 100 mg PE in 0.9% sodium chloride 100 mL IVPB  100 mg PE IntraVENous Q8H Bailey Yeung  mL/hr at 07/11/20 0534 100 mg PE at 07/11/20 0534    valproate (DEPACON) 500 mg in 0.9% sodium chloride 50 mL IVPB  500 mg IntraVENous Q8H Bailey Yeung MD 50 mL/hr at 07/11/20 0544 500 mg at 07/11/20 0544    propofol (DIPRIVAN) 10 mg/mL infusion  0-50 mcg/kg/min IntraVENous TITRATE Connie Marinelli MD 4.3 mL/hr at 07/11/20 0812 10 mcg/kg/min at 07/11/20 3966       Technique: Continuous EEG monitoring was performed using the international 10-20 electrode system, with concomitant digital video. An event marker button was provided to staff to radha suspicious clinical events. Activation procedures were not performed. State of Consciousness: Obtunded    Background: Mixed delta and theta, poorly organized with no clear alpha rhythm. Sleep: Minimal sleep architecture seen. Epileptiform abnormalities: None    Nonepileptiform abnormalities: Muscle artifact seen throughout the study. Patient event button pressed at 1211, 1308, and 1318 for some jerking of his left hand. No epileptiform activity seen. Seizures: None        Interpretation:  Abnormal continuous EEG due to diffuse slowing of the background that is poorly organized indicative of an encephalopathy. No seizures or epileptiform activity was seen. The absence of epileptiform activity does not rule out the presence of a seizure disorder. Clinical correlation is indicated.

## 2020-07-11 NOTE — PROGRESS NOTES
Problem: Dysphagia (Adult)  Goal: *Acute Goals and Plan of Care (Insert Text)  Description: ST. Pt. Will tolerate PO trials with SLP with no overt s/sx of aspiration/penetration. 2. Pt. Will participate in modified barium swallow with 100% participation to fully evaluate swallow function if further indicated in plan of care. LTG: Pt. Will tolerate least restrictive diet without respiratory decline. Outcome: Progressing Towards Goal   SPEECH LANGUAGE PATHOLOGY: DYSPHAGIA- Initial Assessment    NAME/AGE/GENDER: Michelet Sawyer is a 77 y.o. male  DATE: 2020  PRIMARY DIAGNOSIS: Status epilepticus ([facility] [address] [G40.901]  Status epilepticus ([facility] [address] [G40.901]       ICD-10: Treatment Diagnosis: R13.12 Dysphagia, Oropharyngeal Phase    RECOMMENDATIONS   DIET:   NPO    MEDICATIONS: Non-oral     ASPIRATION PRECAUTIONS  Oral care     COMPENSATORY STRATEGIES/MODIFICATIONS  None     EDUCATION:  Recommendations discussed with Nursing  Patient     CONTINUATION OF SKILLED SERVICES/MEDICAL NECESSITY:  Patient is expected to demonstrate progress in  swallow strength, swallow timeliness, swallow function, diet tolerance, and swallow safety in order to  improve swallow safety, work toward diet advancement, and decrease aspiration risk. Patient continues to require skilled intervention due to dysphagia. RECOMMENDATIONS for CONTINUED SPEECH THERAPY:   YES: Anticipate need for ongoing speech therapy during this hospitalization and at next level of care. ASSESSMENT   Patient presents with multiple effortful swallows with each presentation of PO. Delayed cough present with each presentation. Recommend strict NPO. Follow for PO trials at bedside. REHABILITATION POTENTIAL FOR STATED GOALS: Fair    PLAN    FREQUENCY/DURATION: Continue to follow patient 3 times a week for duration of hospital stay to address above goals.     - Recommendations for next treatment session: Next treatment will address PO trials    SUBJECTIVE   Short with answers to questions, nods head no to pain. History of Present Injury/Illness: Mr. Palomo Valderrama  has a past medical history of Diabetes ([address] and Hypertension. Susan Mo He also  has no past surgical history on file. Problem List:  (Impairments causing functional limitations):  dysphagia    Previous Dysphagia: NONE REPORTED  Diet Prior to Evaluation: NPO    Orientation:   Person    Pain: Pain Scale 1: Numeric (0 - 10)  Pain Intensity 1: 0    Cognitive-Linguistic Screen:  Speech Production:   Impaired  Expressive Language:  Impaired     Receptive Language:  Impaired     Cognition:   Impaired   Needs further assessment     OBJECTIVE   Oral Motor:   Labial: Decreased rate  Oral Hygiene: Dry  Lingual: Decreased rate    Swallow evaluation:   Patient consumed trials of ice chip, thin liquid via tsp and honey thick liquid via tsp. Delayed oral prep, multiple effortful swallows with each presentation. Delayed cough present with each presentation. Oxygen dropping into mid 80s with presentations of honey thick liquids. Not appropriate for further trials this date. INTERDISCIPLINARY COLLABORATION: Speech Therapist and Registered Nurse  PRECAUTIONS/ALLERGIES: Patient has no known allergies.     Tool Used: Dysphagia Outcome and Severity Scale (DEANNE)    Score Comments   Normal Diet  [] 7 With no strategies or extra time needed   Functional Swallow  [] 6 May have mild oral or pharyngeal delay   Mild Dysphagia  [] 5 Which may require one diet consistency restricted    Mild-Moderate Dysphagia  [] 4 With 1-2 diet consistencies restricted   Moderate Dysphagia  [] 3 With 2 or more diet consistencies restricted   Moderate-Severe Dysphagia  [] 2 With partial PO strategies (trials with ST only)   Severe Dysphagia  [x] 1 With inability to tolerate any PO safely      Score:  Initial: 1 Most Recent: n/a (Date 07/11/20 )   Interpretation of Tool: The Dysphagia Outcome and Severity Scale (DEANNE) is a simple, easy-to-use, 7-point scale developed to systematically rate the functional severity of dysphagia based on objective assessment and make recommendations for diet level, independence level, and type of nutrition. Current Medications:   No current facility-administered medications on file prior to encounter. Current Outpatient Medications on File Prior to Encounter   Medication Sig Dispense Refill    alogliptin (NESINA) 6.25 mg tablet Take 12.5 mg by mouth. amantadine HCl (SYMMETREL) 100 mg capsule Take 100 mg by mouth. atorvastatin (LIPITOR) 40 mg tablet Take 20 mg by mouth. LORazepam (ATIVAN) 0.5 mg tablet Take  by mouth. tamsulosin (FLOMAX) 0.4 mg capsule Take 0.4 mg by mouth.      levETIRAcetam (KEPPRA) 750 mg tablet Take 1 Tab by mouth two (2) times a day. 60 Tab 0    magnesium oxide (MAG-OX) 400 mg tablet Take 1 Tab by mouth two (2) times a day. 60 Tab 0    albuterol (PROVENTIL HFA, VENTOLIN HFA, PROAIR HFA) 90 mcg/actuation inhaler Take 2 Puffs by inhalation every six (6) hours as needed for Wheezing. 1 Inhaler 0    risperiDONE (RISPERDAL M-TAB) 2 mg disintegrating tablet 1 tab every 8 hours as needed for agitation 20 Tab 0    metFORMIN (GLUCOPHAGE) 1,000 mg tablet Take 1,000 mg by mouth two (2) times a day. glipiZIDE (GLUCOTROL) 10 mg tablet Take 10 mg by mouth two (2) times a day. divalproex ER (DEPAKOTE ER) 250 mg ER tablet Take 250 mg by mouth two (2) times a day. sAXagliptin (ONGLYZA) 5 mg tab tablet Take  by mouth daily. diphenhydrAMINE (BENADRYL) 25 mg capsule Take 25 mg by mouth three (3) times daily. polyethylene glycol (MIRALAX) 17 gram packet Take 17 g by mouth daily. lisinopril (PRINIVIL, ZESTRIL) 10 mg tablet Take 10 mg by mouth daily. Take 1 half tab every day by mouth      propranolol (INDERAL) 10 mg tablet Take  by mouth daily.       aspirin 81 mg chewable tablet Take 81 mg by mouth three (3) times daily as needed for Pain (as needed for pain or fever). After treatment position/precautions:  Upright in bed    Total Treatment Duration:   Time In: 1248  Time Out: 63 Blanche Siddiqui MS, CCC-SLP

## 2020-07-11 NOTE — PROGRESS NOTES
Responded to code S  Patient was being cared for by staff at bedside  No family present    Will follow closely    Juliann Baron, staff

## 2020-07-11 NOTE — PROGRESS NOTES
Ventilator check complete; patient has a #7.5 ET tube secured at the 25 at the lip. Patient is sedated. Patient is not able to follow commands. Breath sounds are diminished. Trachea is midline, Negative for subcutaneous air, and chest excursion is symmetric. Patient is also Negative for cyanosis. All alarms are set and audible. Resuscitation bag is at the head of the bed. Ventilator Settings  Mode FIO2 Rate Tidal Volume Pressure PEEP I:E Ratio   VC+  30 %   14 450 ml     8 cm H20  1:2.92      Peak airway pressure: 31 cm H2O   Minute ventilation: 9.58 l/min     ABG: No results for input(s): PH, PCO2, PO2, HCO3 in the last 72 hours.       Sarbjit Sanchez, RT

## 2020-07-11 NOTE — PROGRESS NOTES
Ventilator check complete; patient has a #7.5 ET tube secured at the 25 at the lip. Patient is sedated. Patient is not able to follow commands. Breath sounds are clear and diminished. Trachea is midline, Negative for subcutaneous air, and chest excursion is symmetric. Patient is also Negative for cyanosis and is Negative for pitting edema. All alarms are set and audible. Resuscitation bag is at the head of the bed. Ventilator Settings  Mode FIO2 Rate Tidal Volume Pressure PEEP I:E Ratio   VC+  30 %   18 0.45 ml     8 cm H20  1:2.92      Peak airway pressure: 19 cm H2O   Minute ventilation: 7.87 l/min     ABG: No results for input(s): PH, PCO2, PO2, HCO3 in the last 72 hours.       [place] Saint

## 2020-07-11 NOTE — PROCEDURES
CEEG Interim Report    CEEG reviewed through time 8:46 AM on 7/11/2020. Findings:    1) Little change overnight. Less discontinuous and more well organized background rhythm. Patient is opening eyes and seems to respond to nurse manipulation. 2) No epileptiform activity or seizures seen. Full report to follow.

## 2020-07-11 NOTE — PROGRESS NOTES
Critical Care Daily Progress Note: 7/11/2020  Admission Date: 7/10/2020     The patient's chart is reviewed and the patient is discussed with the staff. Called by Jovani Person Attending Dr. Gorge Lopez patient with status epileptics. Patient is a assisted living resident with seizure history on 2 agent (keepra and valproic acid)/DM/HTN BIBA for seizure. DR. Castle felt possible ?post ictal. Initially was responsive to him but then had witnessed seizure by staff and given ativan. Not very alert with worsening oxygenation, and acidosis on ABG. He then intubated the patient. BP low and given Neosynephroine. Then another seizure noted and given ativan and loaded keepra at 60 mg/kg. CT head done and noted: atrophy    Subjective:     Opens eyes, looks somewhat anxious, has parkinson tremor. On propofol 10 currently. Changed to PSV 8/8 at 0905.      Current Facility-Administered Medications   Medication Dose Route Frequency    amantadine HCL (SYMMETREL) capsule 100 mg  100 mg Per NG tube DAILY    atorvastatin (LIPITOR) tablet 40 mg  40 mg Per NG tube QHS    lisinopriL (PRINIVIL, ZESTRIL) tablet 10 mg  10 mg Per NG tube DAILY    polyethylene glycol (MIRALAX) packet 17 g  17 g Per NG tube DAILY    propranoloL (INDERAL) tablet 10 mg  10 mg Per NG tube DAILY    tamsulosin (FLOMAX) capsule 0.4 mg  0.4 mg Oral DAILY    midazolam in normal saline (VERSED) 1 mg/mL infusion  0-10 mg/hr IntraVENous CONTINUOUS    sodium chloride (NS) flush 5-40 mL  5-40 mL IntraVENous Q8H    sodium chloride (NS) flush 5-40 mL  5-40 mL IntraVENous PRN    heparin (porcine) injection 5,000 Units  5,000 Units SubCUTAneous Q8H    insulin regular (NOVOLIN R, HUMULIN R) injection   SubCUTAneous Q6H    pantoprazole (PROTONIX) 40 mg in 0.9% sodium chloride 10 mL injection  40 mg IntraVENous Q24H    hydrALAZINE (APRESOLINE) 20 mg/mL injection 10 mg  10 mg IntraVENous Q6H PRN    dextrose 5% infusion  50 mL/hr IntraVENous CONTINUOUS    NUTRITIONAL SUPPORT ELECTROLYTE PRN ORDERS   Does Not Apply PRN    levETIRAcetam (KEPPRA) 1,500 mg in 0.9% sodium chloride 100 mL IVPB  1,500 mg IntraVENous Q12H    fosphenytoin (CEREBYX) 100 mg PE in 0.9% sodium chloride 100 mL IVPB  100 mg PE IntraVENous Q8H    valproate (DEPACON) 500 mg in 0.9% sodium chloride 50 mL IVPB  500 mg IntraVENous Q8H    propofol (DIPRIVAN) 10 mg/mL infusion  0-50 mcg/kg/min IntraVENous TITRATE     Review of Systems  Unobtainable due to patient status. Objective:     Vitals:    07/11/20 0731 07/11/20 0732 07/11/20 0746 07/11/20 0801   BP: 134/65  145/70 146/75   Pulse: 87 86 80 79   Resp: 23 20 26 26   Temp: 98.2 °F (36.8 °C)      SpO2: 97% 97% 99% 98%   Weight:           Intake/Output Summary (Last 24 hours) at 7/11/2020 0858  Last data filed at 7/11/2020 0542  Gross per 24 hour   Intake 1355.98 ml   Output 675 ml   Net 680.98 ml     Physical Exam:          Constitutional:  intubated and mechanically ventilated.   EENMT:  Sclera clear, pupils equal, oral mucosa moist  Respiratory: clear, vent  Cardiovascular:  RRR with no M,G,R;  Gastrointestinal:  soft with no tenderness; positive bowel sounds present  Musculoskeletal:  warm with no cyanosis, no lower extremity edema  Skin:  no jaundice or ecchymosis  Neurologic: no gross neuro deficits     Psychiatric:  Awake, slowly follows commands     LINES:    Peripheral IV 07/09/20 Right Hand 2 days  Peripheral IV 07/09/20 Left 2 days  Peripheral IV 07/09/20 Right Antecubital 1 day  Peripheral IV 07/10/20 Right Forearm Less than 1 day  Urinary Catheter 07/09/20 Coude 1 day  Orogastric Tube 07/10/20 1 day  Airway - Endotracheal Tube 07/09/20 Oral 1     DRIPS:    Versed off  Propofol off  D5 50    CXR:  Clear, ETT      Ventilator Settings  Mode FIO2 Rate Tidal Volume Pressure PEEP   VC+  30 %    450 ml     8 cm H20      Peak airway pressure: 31 cm H2O   Minute ventilation: 9.58 l/min     ABG:   Recent Labs     07/11/20  0347 07/10/20  0306 07/09/20  2101   PHI 7.38 7.28* 7.35   PCO2I 29.0* 37.1 30.2*   PO2I 76 159* 106*   HCO3I 17.0* 17.4* 16.8*     LAB  Recent Labs     07/11/20  0521 07/11/20  0001 07/10/20  1741 07/10/20  1343 07/10/20  0633   GLUCPOC 133* 135* 160* 138* 166*     Recent Labs     07/11/20  0321 07/10/20  0345 07/09/20  1809   WBC 13.6* 14.6* 11.9*   HGB 11.5* 12.7* 12.6*   HCT 37.0* 39.8* 38.4*    172 231   INR 1.1 1.0  --      Recent Labs     07/11/20  0321 07/10/20  0345 07/09/20  2256 07/09/20  1809    148* 139 138   K 4.0 3.8 5.4* 4.8   * 120* 111* 107   CO2 23 14* 18* 21   * 171* 224* 218*   BUN 18 19 24* 24*   CREA 1.15 0.89 1.27 1.27   MG 2.4 1.7*  --  2.1   PHOS 3.1 2.1*  --   --    CA 7.9* 6.8* 7.9* 9.2   ALB 2.4* 2.6*  --  3.7     Recent Labs     07/10/20  0340 07/09/20  2103   LAC 2.7* 5.7*     Patient Active Problem List   Diagnosis Code    [facility] epilepticus ([facility] G40.901    DM2 (diabetes mellitus, type 2) (HCC) E11.9    Acute kidney insufficiency N28.9    Acute respiratory failure with hypoxia (HCC) J96.01    Lactic acid increased E87.2    Essential hypertension I10     Assessment:  (Medical Decision Making)     Hospital Problems  Never Reviewed          Codes Class Noted POA    DM2 (diabetes mellitus, type 2) (HCC) (Chronic) ICD-10-CM: E11.9  ICD-9-CM: 250.00  7/10/2020 Unknown        Acute kidney insufficiency ICD-10-CM: N28.9  ICD-9-CM: 593.9  7/10/2020 Unknown        Acute respiratory failure with hypoxia (HCC) ICD-10-CM: J96.01  ICD-9-CM: 518.81  7/10/2020 Unknown        Lactic acid increased ICD-10-CM: E87.2  ICD-9-CM: 276.2  7/10/2020 Unknown        Essential hypertension (Chronic) ICD-10-CM: I10  ICD-9-CM: 401.9  7/10/2020 Unknown        * (Principal) Status epilepticus ([facility] ICD-10-CM: G40.901  ICD-9-CM: 345.3  7/9/2020 Unknown              Plan:  (Medical Decision Making)     --neurology follow up, no active seizures, off sedation, awake and okay from neuro standpoint to extubate  --minimize sedation  --PSV, try to extubate in 30 minutes if tolerating PSV trial adequately  --continue Keppra, fosphenytoin-defer to neuro  --SSI  --Will need to evaluate swallow after extubation  --Heparin, famotidine    Full Code, will need to clarify with him as able when extubated. Should sign papers if does want to be DNR as this was questioned    More than 50% of the time documented was spent in face-to-face contact with the patient and in the care of the patient on the floor/unit where the patient is located.     Zoila Amaya MD

## 2020-07-11 NOTE — PROCEDURES
CEEG Interim Report    CEEG reviewed through time 11:16 PM on 7/10/2020. Findings:    1) Slightly more organized background with mixed theta and delta. 2) No events seen with no epileptiform activity seen or events. Full report to follow.

## 2020-07-11 NOTE — PROGRESS NOTES
Neurology Daily Progress Note       I have seen and examined the patient along with Rommel Brantley NP. I agree with the documentation as recorded. In addition I would add:    I have seen the patient independently during which time I reviewed the history and performed a physical examination, reviewed the NP documentation and discussed with the NP . The interval history obtained is notable for: No seizures overnight. Weaning propofol with increasing alertness    The physical examination performed is notable for: Prominent rest tremor noted consistent with Parkinson's disease. Patient remains intubated but is awake and follows commands. EEG reviewed. No seizures    The medical decision making including assessment and recommendations is notable for: Status epilepticus resolved. Continue Keppra 1500 mg p.o. or IV every 12 hours Depacon 500 mg IV every 8 hours and fosphenytoin 100 mg IV every 8 hours. Hope for extubation soon      Leilani Aguero MD      Assessment:     77year old male who presented with status epilepticus, resolved. Hx of epilepsy, schizophrenia and PD. cEEG with diffuse slowing and poorly organized, negative for epileptiform activity or seizures. No seizure activity overnight per nursing. Tremors in BUE secondary to PD. Weaning propofol, plans for extubation later today per nursing. Continue Keppra, Fosphenytoin, and Depacon. Consider MRI head once extubated. Labs pending. Plan:     Discontinue video EEG monitoring     Continue to wean propofol. Hope for extubation soon     Continue Keppra to 1500 mg IV every 12 hours     Continue Fosphenytoin 100 mg IV every 8 hours     Continue Depacon 500 mg IV every 8 hours     Total and free phenytoin level     Total and free valproic acid level     Consider MRI head once extubated    Subjective: Interval history:    Remains intubated and sedated with propofol. Alert, able to follow commands and moves all extremities spontaneously. Continues to have tremor in BUE. cEEG with diffuse slowing and poorly organized, negative for epileptiform activity or seizures. Weaning propofol, plans for extubation later today per nursing. Continue Keppra, Fosphenytoin, and Depacon. MRI of brain and labs pending. History:    Ayse Mcclain is a 77 y.o. male who is being seen for status epilepticus. Review of systems negative with exception of pertinent positives and negatives noted above.        Objective:     Vitals:    07/11/20 0801 07/11/20 0905 07/11/20 0937 07/11/20 0947   BP: 146/75  152/68    Pulse: 79 90 90    Resp: 26 24     Temp:       SpO2: 98% 96%  100%   Weight:              Current Facility-Administered Medications:     amantadine HCL (SYMMETREL) capsule 100 mg, 100 mg, Per NG tube, DAILY, Aiyana Crespo MD, 100 mg at 07/11/20 0937    atorvastatin (LIPITOR) tablet 40 mg, 40 mg, Per NG tube, QHS, Aiyana Crespo MD    lisinopriL (PRINIVIL, ZESTRIL) tablet 10 mg, 10 mg, Per NG tube, DAILY, Aiyana Crespo MD, 10 mg at 07/11/20 0937    polyethylene glycol (MIRALAX) packet 17 g, 17 g, Per NG tube, DAILY, Aiyana Crespo MD, 17 g at 07/11/20 0937    propranoloL (INDERAL) tablet 10 mg, 10 mg, Per NG tube, DAILY, Aiyana Crespo MD, 10 mg at 07/11/20 0937    tamsulosin (FLOMAX) capsule 0.4 mg, 0.4 mg, Oral, DAILY, Bryson Freeman MD, Stopped at 07/10/20 0900    midazolam in normal saline (VERSED) 1 mg/mL infusion, 0-10 mg/hr, IntraVENous, CONTINUOUS, Mikal Freeman MD, Stopped at 07/11/20 0537    sodium chloride (NS) flush 5-40 mL, 5-40 mL, IntraVENous, Q8H, Bryson Freeman MD, 10 mL at 07/11/20 0517    sodium chloride (NS) flush 5-40 mL, 5-40 mL, IntraVENous, PRN, Bryson Freeman MD    heparin (porcine) injection 5,000 Units, 5,000 Units, SubCUTAneous, Q8H, Bryson Freeman MD, 5,000 Units at 07/11/20 0534    insulin regular (NOVOLIN R, HUMULIN R) injection, , SubCUTAneous, Q6H, Mikal Freeman MD, Stopped at 07/11/20 0000    pantoprazole (PROTONIX) 40 mg in 0.9% sodium chloride 10 mL injection, 40 mg, IntraVENous, Q24H, Bryson Freeman MD, 40 mg at 07/11/20 0946    hydrALAZINE (APRESOLINE) 20 mg/mL injection 10 mg, 10 mg, IntraVENous, Q6H PRN, Bryson Freeman MD, 10 mg at 07/10/20 0320    dextrose 5% infusion, 50 mL/hr, IntraVENous, CONTINUOUS, David Freeman MD, Last Rate: 50 mL/hr at 07/11/20 0953, 50 mL/hr at 07/11/20 0953    NUTRITIONAL SUPPORT ELECTROLYTE PRN ORDERS, , Does Not Apply, PRN, David Freeman MD    levETIRAcetam (KEPPRA) 1,500 mg in 0.9% sodium chloride 100 mL IVPB, 1,500 mg, IntraVENous, Q12H, McBurney, Anell Bilis, MD, 1,500 mg at 07/11/20 3780    fosphenytoin (CEREBYX) 100 mg PE in 0.9% sodium chloride 100 mL IVPB, 100 mg PE, IntraVENous, Q8H, McBurney, Anell Bilis, MD, Last Rate: 408 mL/hr at 07/11/20 0534, 100 mg PE at 07/11/20 0534    valproate (DEPACON) 500 mg in 0.9% sodium chloride 50 mL IVPB, 500 mg, IntraVENous, Q8H, McBurney, Anell Bilis, MD, Last Rate: 50 mL/hr at 07/11/20 0544, 500 mg at 07/11/20 0544    propofol (DIPRIVAN) 10 mg/mL infusion, 0-50 mcg/kg/min, IntraVENous, TITRATE, Eriberto Amaya MD, Stopped at 07/11/20 4889    Recent Results (from the past 12 hour(s))   GLUCOSE, POC    Collection Time: 07/11/20 12:01 AM   Result Value Ref Range    Glucose (POC) 135 (H) 65 - 100 mg/dL   MAGNESIUM    Collection Time: 07/11/20  3:21 AM   Result Value Ref Range    Magnesium 2.4 1.8 - 2.4 mg/dL   PHOSPHORUS    Collection Time: 07/11/20  3:21 AM   Result Value Ref Range    Phosphorus 3.1 2.3 - 3.7 MG/DL   METABOLIC PANEL, COMPREHENSIVE    Collection Time: 07/11/20  3:21 AM   Result Value Ref Range    Sodium 142 136 - 145 mmol/L    Potassium 4.0 3.5 - 5.1 mmol/L    Chloride 112 (H) 98 - 107 mmol/L    CO2 23 21 - 32 mmol/L    Anion gap 7 7 - 16 mmol/L    Glucose 139 (H) 65 - 100 mg/dL    BUN 18 8 - 23 MG/DL    Creatinine 1.15 0.8 - 1.5 MG/DL    GFR est AA >60 >60 ml/min/1.73m2    GFR est non-AA >60 >60 ml/min/1.73m2    Calcium 7.9 (L) 8.3 - 10.4 MG/DL    Bilirubin, total 0.4 0.2 - 1.1 MG/DL    ALT (SGPT) 24 12 - 65 U/L    AST (SGOT) 27 15 - 37 U/L    Alk. phosphatase 47 (L) 50 - 136 U/L    Protein, total 5.6 (L) 6.3 - 8.2 g/dL    Albumin 2.4 (L) 3.2 - 4.6 g/dL    Globulin 3.2 2.3 - 3.5 g/dL    A-G Ratio 0.8 (L) 1.2 - 3.5     PROTHROMBIN TIME + INR    Collection Time: 07/11/20  3:21 AM   Result Value Ref Range    Prothrombin time 14.2 12.0 - 14.7 sec    INR 1.1     CBC W/O DIFF    Collection Time: 07/11/20  3:21 AM   Result Value Ref Range    WBC 13.6 (H) 4.3 - 11.1 K/uL    RBC 3.67 (L) 4.23 - 5.6 M/uL    HGB 11.5 (L) 13.6 - 17.2 g/dL    HCT 37.0 (L) 41.1 - 50.3 %    .8 (H) 79.6 - 97.8 FL    MCH 31.3 26.1 - 32.9 PG    MCHC 31.1 (L) 31.4 - 35.0 g/dL    RDW 13.7 11.9 - 14.6 %    PLATELET 468 527 - 803 K/uL    MPV 10.4 9.4 - 12.3 FL    ABSOLUTE NRBC 0.00 0.0 - 0.2 K/uL   POC G3    Collection Time: 07/11/20  3:47 AM   Result Value Ref Range    Device: VENT      FIO2 (POC) 30 %    pH (POC) 7.38 7.35 - 7.45      pCO2 (POC) 29.0 (L) 35 - 45 MMHG    pO2 (POC) 76 75 - 100 MMHG    HCO3 (POC) 17.0 (L) 22 - 26 MMOL/L    sO2 (POC) 95 95 - 98 %    Base deficit (POC) 7 mmol/L    Mode Pressure regulated volume control      Tidal volume 450 ml    Set Rate 18 bpm    PEEP/CPAP (POC) 8 cmH2O    Allens test (POC) YES      Inspiratory Time 0.85 sec    Site LEFT RADIAL      Specimen type (POC) ARTERIAL      Performed by [email or web address]     CO2, POC 18 MMOL/L    Respiratory comment: NurseNotified     Exhaled minute volume 9.80 L/min    COLLECT TIME 340     GLUCOSE, POC    Collection Time: 07/11/20  5:21 AM   Result Value Ref Range    Glucose (POC) 133 (H) 65 - 100 mg/dL         Physical Exam:  General - Well developed, well nourished, in no apparent distress. Intubated and sedated. HEENT - Normocephalic, atraumatic. Conjunctiva are clear. Neck - Supple without masses  Cardiovascular - Regular rate and rhythm.  Normal S1, S2 without murmurs, rubs, or gallops. Lungs - Clear to auscultation. Abdomen - Soft, nontender with normal bowel sounds. Extremities - Peripheral pulses intact. No edema and no rashes. Neurological examination    Level of consciousness- alert, opens eyes to voice. Follows commands intermittently.      Brain stem reflexes  -Pupillary reflex to bright light: 3 mm PERRL  -Ciliospinal reflexes: present  -Oculovestibular and/or oculocephalic reflex response:na  -Corneal reflex:na  -Facial movement to painful stimulus at supraorbital nerve, temporomandibular joint: present  -Cough/gag reflex: present    Motor examination  -Muscle tone: normal  -Motor response to pain: moves all extremities spontaneously, withdraws from pain in all extremities  -Muscle stretch reflexes: 2+ BUE, 2+ patellar  -Response to plantar stimulation: flexor bilaterally          Signed By: Yolanda Mae NP     July 11, 2020

## 2020-07-11 NOTE — PROGRESS NOTES
Bedside shift change report given to Anu Shepard RN (oncoming nurse) by Satish Macdonald RN (offgoing nurse). Report included the following information SBAR, ED Summary, Intake/Output, MAR, Recent Results and Cardiac Rhythm NSR.

## 2020-07-11 NOTE — PROGRESS NOTES
Nutrition Note    Nutrition:  Reason for assessment: BPA via malnutrition screening tool (unsure/unsure)  Assessment:  Diet order(s): NPO  Food/Nutrition History:  Hx of DM,parkinson's. Presented with seizure. Intubated in ED at [facility] 7/9 and transferred to [facility]. Pt is now extubated as of this morning. SLP has assessed pt and recommended strict NPO. No family at bedside. Pt is inappropriate for interview. Anthropometrics: Height 5'7\", Weight Source: Bed, Weight: 71.3 kg (157 lb 3 oz), Body mass index is 24.62 kg/m². [place] Mort BMI class of normal weight. WT / BMI 7/10/2020 7/9/2020 9/11/2019 9/7/2019   WEIGHT 157 lb 3 oz 154 lb 15.7 oz 155 lb 155 lb   Per weights listed in EMR, pt's weight has been relatively stable over the past 10 months. Macronutrient needs:  · EER:  2576-3846 kcal /day (20-25 kcal/kg bedscale BW)  · EPR:  57-71 grams protein/day (0.8-1 grams/kg IBW)  Pertinent labs:  Lab Results   Component Value Date/Time    Potassium 4.0 07/11/2020 03:21 AM    Sodium 142 07/11/2020 03:21 AM    Phosphorus 3.1 07/11/2020 03:21 AM    Magnesium 2.4 07/11/2020 03:21 AM    Glucose 139 (H) 07/11/2020 03:21 AM    Creatinine 1.15 07/11/2020 03:21 AM     Nutrition Diagnosis:   Inadequate oral intake related to dysphagia as evidenced by NPO s/p SLP evaluation. Intervention:   Meals and snacks: NPO  Nutrition Supplement Therapy: Activate Nutrition Support Orders for Electrolyte Replacement. EN: Recommend placement of DHT and initiation of TF. Please consult nutrition services for management of TF, if warranted. Coordination of care: Jeremi Magaña RN  Nutrition discharge plan:  Too soon to determine    Electronically signed by Jessica Tapia [address] [place] [address] on 7/11/2020 at 9:32 AM    Contact: [phone]

## 2020-07-12 ENCOUNTER — APPOINTMENT (OUTPATIENT)
Dept: GENERAL RADIOLOGY | Age: 75
DRG: 100 | End: 2020-07-12
Attending: NURSE PRACTITIONER
Payer: MEDICARE

## 2020-07-12 ENCOUNTER — APPOINTMENT (OUTPATIENT)
Dept: GENERAL RADIOLOGY | Age: 75
DRG: 100 | End: 2020-07-12
Attending: INTERNAL MEDICINE
Payer: MEDICARE

## 2020-07-12 PROBLEM — N17.9 AKI (ACUTE KIDNEY INJURY) (HCC): Status: RESOLVED | Noted: 2020-07-10 | Resolved: 2020-07-12

## 2020-07-12 PROBLEM — E87.20 LACTIC ACID INCREASED: Status: RESOLVED | Noted: 2020-07-10 | Resolved: 2020-07-12

## 2020-07-12 PROBLEM — N28.9 ACUTE KIDNEY INSUFFICIENCY: Status: RESOLVED | Noted: 2020-07-10 | Resolved: 2020-07-12

## 2020-07-12 PROBLEM — R13.10 DYSPHAGIA: Status: ACTIVE | Noted: 2020-07-12

## 2020-07-12 PROBLEM — R65.10 SIRS (SYSTEMIC INFLAMMATORY RESPONSE SYNDROME) (HCC): Status: ACTIVE | Noted: 2020-07-11

## 2020-07-12 LAB
ALBUMIN SERPL-MCNC: 2.1 G/DL (ref 3.2–4.6)
ALBUMIN/GLOB SERPL: 0.7 {RATIO} (ref 1.2–3.5)
ALP SERPL-CCNC: 46 U/L (ref 50–136)
ALT SERPL-CCNC: 18 U/L (ref 12–65)
ANION GAP SERPL CALC-SCNC: 8 MMOL/L (ref 7–16)
ANION GAP SERPL CALC-SCNC: 9 MMOL/L (ref 7–16)
AST SERPL-CCNC: 24 U/L (ref 15–37)
BILIRUB SERPL-MCNC: 0.3 MG/DL (ref 0.2–1.1)
BUN SERPL-MCNC: 11 MG/DL (ref 8–23)
BUN SERPL-MCNC: 12 MG/DL (ref 8–23)
CALCIUM SERPL-MCNC: 7.4 MG/DL (ref 8.3–10.4)
CALCIUM SERPL-MCNC: 7.6 MG/DL (ref 8.3–10.4)
CHLORIDE SERPL-SCNC: 104 MMOL/L (ref 98–107)
CHLORIDE SERPL-SCNC: 106 MMOL/L (ref 98–107)
CK MB CFR SERPL CALC: 1.4 %
CK MB SERPL-MCNC: 2.9 NG/ML (ref 0.5–3.6)
CK SERPL-CCNC: 204 U/L (ref 21–215)
CK SERPL-CCNC: 205 U/L (ref 21–215)
CO2 SERPL-SCNC: 23 MMOL/L (ref 21–32)
CO2 SERPL-SCNC: 23 MMOL/L (ref 21–32)
CREAT SERPL-MCNC: 1.1 MG/DL (ref 0.8–1.5)
CREAT SERPL-MCNC: 1.11 MG/DL (ref 0.8–1.5)
ERYTHROCYTE [DISTWIDTH] IN BLOOD BY AUTOMATED COUNT: 13.6 % (ref 11.9–14.6)
EST. AVERAGE GLUCOSE BLD GHB EST-MCNC: 163 MG/DL
GLOBULIN SER CALC-MCNC: 3.2 G/DL (ref 2.3–3.5)
GLUCOSE BLD STRIP.AUTO-MCNC: 102 MG/DL (ref 65–100)
GLUCOSE BLD STRIP.AUTO-MCNC: 107 MG/DL (ref 65–100)
GLUCOSE BLD STRIP.AUTO-MCNC: 108 MG/DL (ref 65–100)
GLUCOSE BLD STRIP.AUTO-MCNC: 108 MG/DL (ref 65–100)
GLUCOSE BLD STRIP.AUTO-MCNC: 116 MG/DL (ref 65–100)
GLUCOSE SERPL-MCNC: 339 MG/DL (ref 65–100)
GLUCOSE SERPL-MCNC: 345 MG/DL (ref 65–100)
HBA1C MFR BLD: 7.3 % (ref 4.8–6)
HCT VFR BLD AUTO: 29.4 % (ref 41.1–50.3)
HGB BLD-MCNC: 9.8 G/DL (ref 13.6–17.2)
MAGNESIUM SERPL-MCNC: 2 MG/DL (ref 1.8–2.4)
MCH RBC QN AUTO: 32.6 PG (ref 26.1–32.9)
MCHC RBC AUTO-ENTMCNC: 33.3 G/DL (ref 31.4–35)
MCV RBC AUTO: 97.7 FL (ref 79.6–97.8)
NRBC # BLD: 0 K/UL (ref 0–0.2)
PHOSPHATE SERPL-MCNC: 2.7 MG/DL (ref 2.3–3.7)
PLATELET # BLD AUTO: 170 K/UL (ref 150–450)
PMV BLD AUTO: 10.4 FL (ref 9.4–12.3)
POTASSIUM SERPL-SCNC: 3.5 MMOL/L (ref 3.5–5.1)
POTASSIUM SERPL-SCNC: 3.7 MMOL/L (ref 3.5–5.1)
PROCALCITONIN SERPL-MCNC: 0.19 NG/ML
PROT SERPL-MCNC: 5.3 G/DL (ref 6.3–8.2)
RBC # BLD AUTO: 3.01 M/UL (ref 4.23–5.6)
SODIUM SERPL-SCNC: 136 MMOL/L (ref 136–145)
SODIUM SERPL-SCNC: 137 MMOL/L (ref 136–145)
TROPONIN-HIGH SENSITIVITY: 55.6 PG/ML (ref 0–14)
WBC # BLD AUTO: 13.6 K/UL (ref 4.3–11.1)

## 2020-07-12 PROCEDURE — 92526 ORAL FUNCTION THERAPY: CPT

## 2020-07-12 PROCEDURE — 77030040361 HC SLV COMPR DVT MDII -B

## 2020-07-12 PROCEDURE — 74011250636 HC RX REV CODE- 250/636: Performed by: PSYCHIATRY & NEUROLOGY

## 2020-07-12 PROCEDURE — 77030036696 HC BOOT TRACT BUCKS S2SG -A

## 2020-07-12 PROCEDURE — 85027 COMPLETE CBC AUTOMATED: CPT

## 2020-07-12 PROCEDURE — 83735 ASSAY OF MAGNESIUM: CPT

## 2020-07-12 PROCEDURE — 65660000000 HC RM CCU STEPDOWN

## 2020-07-12 PROCEDURE — 87040 BLOOD CULTURE FOR BACTERIA: CPT

## 2020-07-12 PROCEDURE — 74011000258 HC RX REV CODE- 258: Performed by: PSYCHIATRY & NEUROLOGY

## 2020-07-12 PROCEDURE — 71045 X-RAY EXAM CHEST 1 VIEW: CPT

## 2020-07-12 PROCEDURE — 82962 GLUCOSE BLOOD TEST: CPT

## 2020-07-12 PROCEDURE — 74011000258 HC RX REV CODE- 258: Performed by: INTERNAL MEDICINE

## 2020-07-12 PROCEDURE — 74011250636 HC RX REV CODE- 250/636: Performed by: INTERNAL MEDICINE

## 2020-07-12 PROCEDURE — 83036 HEMOGLOBIN GLYCOSYLATED A1C: CPT

## 2020-07-12 PROCEDURE — 99232 SBSQ HOSP IP/OBS MODERATE 35: CPT | Performed by: NURSE PRACTITIONER

## 2020-07-12 PROCEDURE — 97530 THERAPEUTIC ACTIVITIES: CPT

## 2020-07-12 PROCEDURE — 97162 PT EVAL MOD COMPLEX 30 MIN: CPT

## 2020-07-12 PROCEDURE — 84100 ASSAY OF PHOSPHORUS: CPT

## 2020-07-12 RX ORDER — SODIUM CHLORIDE, SODIUM LACTATE, POTASSIUM CHLORIDE, CALCIUM CHLORIDE 600; 310; 30; 20 MG/100ML; MG/100ML; MG/100ML; MG/100ML
75 INJECTION, SOLUTION INTRAVENOUS CONTINUOUS
Status: DISCONTINUED | OUTPATIENT
Start: 2020-07-12 | End: 2020-07-17

## 2020-07-12 RX ORDER — DEXTROSE 40 %
15 GEL (GRAM) ORAL AS NEEDED
Status: DISCONTINUED | OUTPATIENT
Start: 2020-07-12 | End: 2020-07-21 | Stop reason: HOSPADM

## 2020-07-12 RX ORDER — DEXTROSE 50 % IN WATER (D50W) INTRAVENOUS SYRINGE
25-50 AS NEEDED
Status: DISCONTINUED | OUTPATIENT
Start: 2020-07-12 | End: 2020-07-21 | Stop reason: HOSPADM

## 2020-07-12 RX ORDER — INSULIN LISPRO 100 [IU]/ML
0-10 INJECTION, SOLUTION INTRAVENOUS; SUBCUTANEOUS
Status: DISCONTINUED | OUTPATIENT
Start: 2020-07-12 | End: 2020-07-17

## 2020-07-12 RX ADMIN — HEPARIN SODIUM 5000 UNITS: 5000 INJECTION INTRAVENOUS; SUBCUTANEOUS at 14:32

## 2020-07-12 RX ADMIN — AMPICILLIN SODIUM AND SULBACTAM SODIUM 3 G: 2; 1 INJECTION, POWDER, FOR SOLUTION INTRAMUSCULAR; INTRAVENOUS at 14:55

## 2020-07-12 RX ADMIN — SODIUM CHLORIDE, SODIUM LACTATE, POTASSIUM CHLORIDE, AND CALCIUM CHLORIDE 75 ML/HR: 600; 310; 30; 20 INJECTION, SOLUTION INTRAVENOUS at 08:36

## 2020-07-12 RX ADMIN — Medication 10 ML: at 14:32

## 2020-07-12 RX ADMIN — FOSPHENYTOIN 100 MG PE: 50 INJECTION INTRAMUSCULAR; INTRAVENOUS at 22:49

## 2020-07-12 RX ADMIN — AMPICILLIN SODIUM AND SULBACTAM SODIUM 3 G: 2; 1 INJECTION, POWDER, FOR SOLUTION INTRAMUSCULAR; INTRAVENOUS at 09:14

## 2020-07-12 RX ADMIN — HEPARIN SODIUM 5000 UNITS: 5000 INJECTION INTRAVENOUS; SUBCUTANEOUS at 22:50

## 2020-07-12 RX ADMIN — Medication 5 ML: at 05:31

## 2020-07-12 RX ADMIN — AMPICILLIN SODIUM AND SULBACTAM SODIUM 3 G: 2; 1 INJECTION, POWDER, FOR SOLUTION INTRAMUSCULAR; INTRAVENOUS at 20:45

## 2020-07-12 RX ADMIN — HEPARIN SODIUM 5000 UNITS: 5000 INJECTION INTRAVENOUS; SUBCUTANEOUS at 05:31

## 2020-07-12 RX ADMIN — LEVETIRACETAM 1500 MG: 500 INJECTION, SOLUTION INTRAVENOUS at 20:27

## 2020-07-12 RX ADMIN — DEXTROSE MONOHYDRATE 50 ML/HR: 5 INJECTION, SOLUTION INTRAVENOUS at 01:59

## 2020-07-12 RX ADMIN — FOSPHENYTOIN 100 MG PE: 50 INJECTION INTRAMUSCULAR; INTRAVENOUS at 14:32

## 2020-07-12 RX ADMIN — Medication 5 ML: at 22:54

## 2020-07-12 RX ADMIN — LEVETIRACETAM 1500 MG: 500 INJECTION, SOLUTION INTRAVENOUS at 08:39

## 2020-07-12 NOTE — PROGRESS NOTES
Hospitalist Progress Note     Admit Date:  7/10/2020 12:55 AM   Name:  Tru Pickett   Age:  77 y.o.  :  1945   MRN:  [id number]   PCP:  Janna Crenshaw MD  Treatment Team: Attending Provider: Ness Herron MD; Consulting Provider: Sharath Marks MD; Care Manager: Pinky Carlton RN; Utilization Review: Feroz Staley; Consulting Provider: Alyx Malloy MD; Consulting Provider: Ness Herron MD; Charge Nurse: Avery Bearded Speech Language Pathologist: Yoni Dimas    Subjective:   HPI and or CC:     77year old CM who presented with status epilepticus, resolved on . Hx of epilepsy, schizophrenia and Parkinson's disease. He was in the ICU initially intubated, extubated  and transferred to the medical floor . No further seizure activity noted. Neurology continues to follow along, remains on Keppra and Dilantin IV. He is  NPO after ST evaluated patient today noting cough with wet congestion. :  Patient alert and oriented to name, place. Seen by neurology. MRI brain pending. Patient refusing NGT and blood draws at this time. This provider spoke with patient and explained importance of doing these things. He states wanting to eat and would not allow anything to be done. Will wait and try again tomorrow.           Objective:     Patient Vitals for the past 24 hrs:   Temp Pulse Resp BP SpO2   20 0800 97.5 °F (36.4 °C) 71 18 116/65 98 %   20 0614 98.1 °F (36.7 °C) 75 17 126/75 98 %   20 0147 97.6 °F (36.4 °C) 65 18 132/70 98 %   20 0100 -- 76 24 157/70 99 %   20 0030 -- 75 30 136/64 95 %   20 0000 -- 78 30 141/74 96 %   20 2330 -- 70 24 133/62 98 %   20 2300 97.8 °F (36.6 °C) 64 18 160/70 100 %   20 2230 -- 69 28 144/62 98 %   07/11/20 2200 -- 66 17 98/65 94 %   20 -- 65 22 103/57 100 %   20 -- 68 (!) 35 127/58 99 %   20 -- 62 25 130/89 99 %   20 -- 62 22 133/63 100 %   07/11/20 1930 -- 62 22 130/61 99 %   07/11/20 1900 97.9 °F (36.6 °C) 68 23 118/58 98 %   07/11/20 1831 -- 67 23 141/58 97 %   07/11/20 1817 -- 67 20 141/64 100 %   07/11/20 1801 -- 67 24 125/57 99 %   07/11/20 1745 -- 64 27 136/62 99 %   07/11/20 1732 -- 68 20 138/65 98 %   07/11/20 1716 -- 66 24 126/58 98 %   07/11/20 1701 -- 68 20 124/58 96 %   07/11/20 1646 -- 68 26 145/65 97 %   07/11/20 1632 -- 67 24 99/66 97 %   07/11/20 1616 -- 68 26 116/57 97 %   07/11/20 1601 -- 68 26 117/58 97 %   07/11/20 1549 -- 72 22 125/59 --   07/11/20 1501 97.9 °F (36.6 °C) 72 24 142/64 100 %   07/11/20 1445 -- 76 22 118/63 99 %   07/11/20 1431 -- 72 25 111/55 100 %   07/11/20 1416 -- 72 21 126/62 99 %   07/11/20 1345 -- 70 20 115/58 98 %   07/11/20 1331 -- 67 27 122/59 100 %   07/11/20 1316 -- 69 24 139/62 98 %   07/11/20 1301 -- 75 26 143/63 96 %   07/11/20 1245 -- 70 21 152/72 100 %   07/11/20 1231 -- 69 23 141/67 99 %   07/11/20 1216 -- 73 24 155/73 99 %   07/11/20 1201 -- 69 22 139/64 98 %   07/11/20 1145 -- 69 23 148/69 98 %   07/11/20 1132 -- 71 23 157/69 99 %     Oxygen Therapy  O2 Sat (%): 98 % (07/12/20 0800)  Pulse via Oximetry: 75 beats per minute (07/12/20 0100)  O2 Device: Nasal cannula (07/11/20 2300)  O2 Flow Rate (L/min): 3 l/min (07/11/20 2300)  FIO2 (%): 30 % (07/11/20 0905)    Intake/Output Summary (Last 24 hours) at 7/12/2020 1129  Last data filed at 7/12/2020 3891  Gross per 24 hour   Intake 833.28 ml   Output 1550 ml   Net -716.72 ml         REVIEW OF SYSTEMS: Comprehensive ROS performed and negative except as stated in HPI. Physical Examination:  General:    Well nourished. No gross distress. Head:  Normocephalic, atraumatic, nares patent  Eyes:  Extraocular movements intact, normal sclera  CV:   RRR. No  Murmurs, clicks, or gallops, distal pulses intact  Lungs:   Unlabored, no cyanosis, mild wheezing upper right lobe, coarse rales lower bilateral fields.   Abdomen:   Soft, nondistended, nontender. Extremities: Warm and dry. No cyanosis or edema. Skin:     No rashes or jaundice. Neuro:  No gross focal deficits, no tremor  Psych:  Mood and affect appropriate    Data Review:  I have reviewed all labs, meds, telemetry events, and studies from the last 24 hours. Recent Results (from the past 24 hour(s))   GLUCOSE, POC    Collection Time: 07/11/20 12:12 PM   Result Value Ref Range    Glucose (POC) 153 (H) 65 - 100 mg/dL   GLUCOSE, POC    Collection Time: 07/11/20  3:13 PM   Result Value Ref Range    Glucose (POC) 174 (H) 65 - 100 mg/dL   POC G3    Collection Time: 07/11/20  3:55 PM   Result Value Ref Range    Device: NASAL CANNULA      pH (POC) 7.39 7.35 - 7.45      pCO2 (POC) 30.2 (L) 35 - 45 MMHG    pO2 (POC) 61 (L) 75 - 100 MMHG    HCO3 (POC) 18.2 (L) 22 - 26 MMOL/L    sO2 (POC) 91 (L) 95 - 98 %    Base deficit (POC) 6 mmol/L    Allens test (POC) YES      Site LEFT RADIAL      Specimen type (POC) ARTERIAL      Performed by Vannesa     CO2, POC 19 MMOL/L    Flow rate (POC) 2.000 L/min    COLLECT TIME 1,550     CBC WITH AUTOMATED DIFF    Collection Time: 07/11/20  5:00 PM   Result Value Ref Range    WBC 18.6 (H) 4.3 - 11.1 K/uL    RBC 3.68 (L) 4.23 - 5.6 M/uL    HGB 11.4 (L) 13.6 - 17.2 g/dL    HCT 36.8 (L) 41.1 - 50.3 %    .0 (H) 79.6 - 97.8 FL    MCH 31.0 26.1 - 32.9 PG    MCHC 31.0 (L) 31.4 - 35.0 g/dL    RDW 13.6 11.9 - 14.6 %    PLATELET 179 427 - 491 K/uL    MPV 10.1 9.4 - 12.3 FL    ABSOLUTE NRBC 0.00 0.0 - 0.2 K/uL    DF AUTOMATED      NEUTROPHILS 82 (H) 43 - 78 %    LYMPHOCYTES 11 (L) 13 - 44 %    MONOCYTES 7 4.0 - 12.0 %    EOSINOPHILS 0 (L) 0.5 - 7.8 %    BASOPHILS 0 0.0 - 2.0 %    IMMATURE GRANULOCYTES 1 0.0 - 5.0 %    ABS. NEUTROPHILS 15.2 (H) 1.7 - 8.2 K/UL    ABS. LYMPHOCYTES 2.0 0.5 - 4.6 K/UL    ABS. MONOCYTES 1.3 0.1 - 1.3 K/UL    ABS. EOSINOPHILS 0.0 0.0 - 0.8 K/UL    ABS. BASOPHILS 0.0 0.0 - 0.2 K/UL    ABS. IMM.  GRANS. 0.1 0.0 - 0.5 K/UL   GLUCOSE, POC Collection Time: 07/11/20  5:57 PM   Result Value Ref Range    Glucose (POC) 150 (H) 65 - 770 mg/dL   METABOLIC PANEL, BASIC    Collection Time: 07/11/20 11:46 PM   Result Value Ref Range    Sodium 136 136 - 145 mmol/L    Potassium 3.7 3.5 - 5.1 mmol/L    Chloride 104 98 - 107 mmol/L    CO2 23 21 - 32 mmol/L    Anion gap 9 7 - 16 mmol/L    Glucose 339 (H) 65 - 100 mg/dL    BUN 12 8 - 23 MG/DL    Creatinine 1.11 0.8 - 1.5 MG/DL    GFR est AA >60 >60 ml/min/1.73m2    GFR est non-AA >60 >60 ml/min/1.73m2    Calcium 7.4 (L) 8.3 - 10.4 MG/DL   CK    Collection Time: 07/11/20 11:46 PM   Result Value Ref Range     21 - 215 U/L   CK WITH MB    Collection Time: 07/11/20 11:46 PM   Result Value Ref Range    CK - MB 2.9 0.5 - 3.6 ng/ml    CK-MB Index 1.4 <2.5 %     21 - 215 U/L   TROPONIN-HIGH SENSITIVITY    Collection Time: 07/11/20 11:46 PM   Result Value Ref Range    Troponin-High Sensitivity 55.6 (HH) 0 - 14 pg/mL   PROCALCITONIN    Collection Time: 07/11/20 11:46 PM   Result Value Ref Range    Procalcitonin 0.19 ng/mL   GLUCOSE, POC    Collection Time: 07/12/20 12:40 AM   Result Value Ref Range    Glucose (POC) 108 (H) 65 - 100 mg/dL   GLUCOSE, POC    Collection Time: 07/12/20  5:29 AM   Result Value Ref Range    Glucose (POC) 116 (H) 65 - 100 mg/dL   MAGNESIUM    Collection Time: 07/12/20  6:47 AM   Result Value Ref Range    Magnesium 2.0 1.8 - 2.4 mg/dL   PHOSPHORUS    Collection Time: 07/12/20  6:47 AM   Result Value Ref Range    Phosphorus 2.7 2.3 - 3.7 MG/DL   METABOLIC PANEL, COMPREHENSIVE    Collection Time: 07/12/20  6:47 AM   Result Value Ref Range    Sodium 137 136 - 145 mmol/L    Potassium 3.5 3.5 - 5.1 mmol/L    Chloride 106 98 - 107 mmol/L    CO2 23 21 - 32 mmol/L    Anion gap 8 7 - 16 mmol/L    Glucose 345 (H) 65 - 100 mg/dL    BUN 11 8 - 23 MG/DL    Creatinine 1.10 0.8 - 1.5 MG/DL    GFR est AA >60 >60 ml/min/1.73m2    GFR est non-AA >60 >60 ml/min/1.73m2    Calcium 7.6 (L) 8.3 - 10.4 MG/DL Bilirubin, total 0.3 0.2 - 1.1 MG/DL    ALT (SGPT) 18 12 - 65 U/L    AST (SGOT) 24 15 - 37 U/L    Alk.  phosphatase 46 (L) 50 - 136 U/L    Protein, total 5.3 (L) 6.3 - 8.2 g/dL    Albumin 2.1 (L) 3.2 - 4.6 g/dL    Globulin 3.2 2.3 - 3.5 g/dL    A-G Ratio 0.7 (L) 1.2 - 3.5     CBC W/O DIFF    Collection Time: 07/12/20  6:47 AM   Result Value Ref Range    WBC 13.6 (H) 4.3 - 11.1 K/uL    RBC 3.01 (L) 4.23 - 5.6 M/uL    HGB 9.8 (L) 13.6 - 17.2 g/dL    HCT 29.4 (L) 41.1 - 50.3 %    MCV 97.7 79.6 - 97.8 FL    MCH 32.6 26.1 - 32.9 PG    MCHC 33.3 31.4 - 35.0 g/dL    RDW 13.6 11.9 - 14.6 %    PLATELET 947 863 - 028 K/uL    MPV 10.4 9.4 - 12.3 FL    ABSOLUTE NRBC 0.00 0.0 - 0.2 K/uL        All Micro Results     Procedure Component Value Units Date/Time    CULTURE, RESPIRATORY/SPUTUM/BRONCH [facility] [[id number]]     Order Status:  Sent Specimen:  Sputum           Current Meds:  Current Facility-Administered Medications   Medication Dose Route Frequency    ampicillin-sulbactam (UNASYN) 3 g in 0.9% sodium chloride (MBP/ADV) 100 mL  3 g IntraVENous Q6H    lactated Ringers infusion  75 mL/hr IntraVENous CONTINUOUS    insulin lispro (HUMALOG) injection 0-10 Units  0-10 Units SubCUTAneous AC&HS    dextrose 40% (GLUTOSE) oral gel 1 Tube  15 g Oral PRN    glucagon (GLUCAGEN) injection 1 mg  1 mg IntraMUSCular PRN    dextrose (D50W) injection syrg 12.5-25 g  25-50 mL IntraVENous PRN    amantadine HCL (SYMMETREL) capsule 100 mg  100 mg Per NG tube DAILY    atorvastatin (LIPITOR) tablet 40 mg  40 mg Per NG tube QHS    lisinopriL (PRINIVIL, ZESTRIL) tablet 10 mg  10 mg Per NG tube DAILY    polyethylene glycol (MIRALAX) packet 17 g  17 g Per NG tube DAILY    propranoloL (INDERAL) tablet 10 mg  10 mg Per NG tube DAILY    tamsulosin (FLOMAX) capsule 0.4 mg  0.4 mg Oral DAILY    sodium chloride (NS) flush 5-40 mL  5-40 mL IntraVENous Q8H    sodium chloride (NS) flush 5-40 mL  5-40 mL IntraVENous PRN    heparin (porcine) injection 5,000 Units  5,000 Units SubCUTAneous Q8H    hydrALAZINE (APRESOLINE) 20 mg/mL injection 10 mg  10 mg IntraVENous Q6H PRN    NUTRITIONAL SUPPORT ELECTROLYTE PRN ORDERS   Does Not Apply PRN    levETIRAcetam (KEPPRA) 1,500 mg in 0.9% sodium chloride 100 mL IVPB  1,500 mg IntraVENous Q12H    fosphenytoin (CEREBYX) 100 mg PE in 0.9% sodium chloride 100 mL IVPB  100 mg PE IntraVENous Q8H       Diet:  DIET NPO    Other Studies (last 24 hours):  Xr Chest Sngl V    Result Date: 7/12/2020  EXAM: Chest x-ray. INDICATION: Dyspnea. COMPARISON: Yesterday's chest x-ray. TECHNIQUE: Frontal view chest x-ray. FINDINGS: The endotracheal and enteric tubes have been removed. New mild bibasilar lung atelectasis or infiltrates are present. Linear right upper lobe scarring is unchanged. The cardiac size is within normal limits. No pneumothorax or pleural effusion is seen. IMPRESSION: 1. Interval extubation. 2. New mild bibasilar lung atelectasis or infiltrates. Ct Perf W Cbf    Result Date: 7/11/2020  EXAMINATION: CT PERFUSION DATE: 7/11/2020 3:42 PM INDICATION:  The patient is a 77years year old Male presenting with symptoms of right sided facial droop COMPARISON: Head CT 7/11/2020 TECHNIQUE: CT perfusion of the brain was obtained after the administration of intravenous contrast. Perfusion maps and perfusion analysis output were generated using the RAPID perfusion processing software algorithm. Radiation dose reduction techniques were used for this study: All CT scans performed at this facility use one or all of the following: Automated exposure control, adjustment of the mA and/or kVp according to patient's size, iterative reconstruction. Total radiation dose: 6412 mGy-cm FINDINGS: Study is technically adequate. Adequate vascular enhancement is demonstrated.  RAPID Output Values: CBF < 30% volume (best correlation with core infarct volume without overcalls): 0 ml (core infarction volume greater than 50 cc associated with poor outcomes) Tmax > 6 seconds: 0 ml Tmax/CBF Mismatch Volume: 0 ml Tmax/CBF Mismatch Ratio: None Hypoperfusion Intensity Ratio (Tmax > 10 seconds / Tmax > 6 seconds): 0 (values greater than 0.5 associated with poor outcome) Tmax > 10 seconds Volume: 0 ml (volume greater than 100 mL is associated with poor outcome)     IMPRESSION: No evidence of CT cerebral perfusion defect. Please note that the determination of patient treatment is not based solely upon imaging factors or calculation values. Management of ischemia is at the discretion of the primary physician and is based upon a combination of clinical and imaging data, along other factors. Cta Code Neuro Head And Neck W Cont    Result Date: 7/11/2020  Title:  CT arteriogram of the neck and head. Indication: Cerebrovascular accident (CVA). . Technique: Axial images of the neck and head were obtained after the uneventful administration of intravenous iodinated contrast media. Contrast was used to best identify the arterial structures. Images were reviewed on a separate, free standing, three-dimensional workstation as per the referring physicians request.  All stenosis percentages derived by comparing the narrowest segment with the distal Internal Carotid Artery luminal diameter, as described in the [place] American Symptomatic Carotid Endarterectomy Trial (NASCET) criteria. All CT scans at this facility are performed using dose reduction/dose modulation techniques, as appropriate the performed exam, including the following: Automated Exposure Control; Adjustment of the mA and/or kV according to patient size (this includes techniques or standardized protocols for targeted exams where dose is matched to indication/reason for exam); and Use of Iterative Reconstruction Technique. Comparison: None. Findings: Lungs:  No focal consolidation or pleural effusions. No suspicious pulmonary nodules. . Moderate centrilobular emphysema with segmental atelectasis/scarring in the right upper lobe. Bones:  No osseous destruction. .  Paranasal sinuses:  Paranasal sinuses are clear. .  Brain:  No midline shift. No enhancing mass lesion or hydrocephalus. .  Soft tissues:  Within normal limits. .  Dural venous sinuses:  Patent. Aortic arch:  Nonaneurysmal. Moderate to advanced calcific atherosclerosis. .  Right brachiocephalic artery:  No significant stenosis or occlusion. .  . Right subclavian artery:  No significant stenosis or occlusion. .  . Left subclavian artery:  No significant stenosis or occlusion. .  . Right common carotid artery:  No significant stenosis or occlusion. .  . Right external carotid artery:  No significant stenosis or occlusion. .  . Right internal carotid artery: Moderate atherosclerosis burden at the right carotid bulb with a high-grade stenosis. Dense calcific atherosclerosis in the intracranial right ICA with areas of moderate narrowing. Left common carotid artery: No significant stenosis or occlusion. .  . Left external carotid artery:  No significant stenosis or occlusion. .  . Left internal carotid artery: Moderate atherosclerotic burden at the left carotid bulb without high-grade stenosis. There is an approximately 70 to 80% stenosis in the petrous segment of the left ICA. Downstream in the carotid siphon there are multifocal areas of mild to moderate stenoses. Right vertebral artery:  No significant stenosis or occlusion. .. Left vertebral artery:  Multifocal areas of mild stenoses along the course of the left vertebral artery without high-grade stenosis or occlusion. . Left vertebral artery is dominant. Basilar artery:  No significant stenosis, occlusion, or aneurysm. .  Right middle cerebral artery:  No significant stenosis, occlusion, or aneurysm. Right anterior cerebral artery:  No significant stenosis, occlusion, or aneurysm. Tara Adam Anterior communicating artery: No significant stenosis, occlusion, or aneurysm. Tara Adam   Left middle cerebral artery: No significant stenosis, occlusion, or aneurysm. [place] Left anterior cerebral artery:  No significant stenosis, occlusion, or aneurysm. .  Right posterior communicating artery: No significant stenosis, occlusion, or aneurysm. [place] Left posterior communicating artery:  No significant stenosis, occlusion, or aneurysm. .  Right posterior cerebral artery: Moderate stenosis in the proximal right PCA. Left posterior cerebral artery:  No significant stenosis, occlusion, or aneurysm. .      Impression: 1. No acute arterial large vessel occlusion is identified. 2.  There is an approximately 70 to 80% stenosis in the petrous segment of the left ICA. Multifocal areas of mild to moderate stenoses downstream in the left carotid siphon. The right carotid siphon also demonstrates moderate stenoses. 3.  Moderate stenosis of the proximal right PCA. 4.  Emphysematous changes in the lungs with right upper lobe atelectasis/scar. Ct Code Neuro Head Wo Contrast    Result Date: 7/11/2020  Head CT INDICATION: Right facial droop, slurred speech Multiple axial images obtained through the brain without intravenous contrast. Radiation dose reduction techniques were used for this study: All CT scans performed at this facility use one or all of the following: Automated exposure control, adjustment of the mA and/or kVp according to patient's size, iterative reconstruction. Compared with 07/09/2020 FINDINGS: There is stable diffuse cerebral atrophy. Mild chronic changes also again noted in the white matter. There is no CT evidence of acute hemorrhage or infarction. The ventricles are normal in size. There are no extra-axial fluid collections. No masses are seen. The sinuses are clear. There are no bony lesions. IMPRESSION: No CT evidence of acute intracranial abnormality.        Assessment and Plan:     Hospital Problems as of 7/12/2020 Date Reviewed: 7/12/2020          Codes Class Noted - Resolved POA    Dysphagia ICD-10-CM: R13.10  ICD-9-CM: 787.20  7/12/2020 - Present Yes        SIRS (systemic inflammatory response syndrome) (HCC) ICD-10-CM: R65.10  ICD-9-CM: 995.90  7/11/2020 - Present Yes        DM2 (diabetes mellitus, type 2) (HCC) (Chronic) ICD-10-CM: E11.9  ICD-9-CM: 250.00  7/10/2020 - Present Yes        Acute respiratory failure with hypoxia (HCC) ICD-10-CM: J96.01  ICD-9-CM: 518.81  7/10/2020 - Present Yes        Essential hypertension (Chronic) ICD-10-CM: I10  ICD-9-CM: 401.9  7/10/2020 - Present Yes        * (Principal) Status epilepticus ([address] ICD-10-CM: G40.901  ICD-9-CM: 345.3  7/9/2020 - Present Yes        RESOLVED: Lactic acid increased ICD-10-CM: E87.2  ICD-9-CM: 276.2  7/10/2020 - 7/12/2020 Yes              A/P:    1. Status epilepticus resolved with seizure disorder:  Neurology following  Keppra and Dilantin IV  Telemetry  Seizure precautions. 2. Type II DM:  Check A1C  SSI    3. Parkinson's disease:  Continue home medications when able to take oral  (Amantadine)    4. Essential HTN:  Monitor  Prn medications  Home medications once able to take oral    5. Dysphagia:  ST following  NPO for now  LR 75 ml/h  Will need to consider other alternative (TF) if NPO status continues next day or so. Will have NGT placed for medicine administration. 6. New mild bibasilar lung atelectasis or infiltrates:  Continue Unasyn for now  Sputum culture ordered  Repeat procalcitonin and chest x ray in am.  **of note, BC from 7/9 right AC aerobic bottle grew gram  Positive rods, no other documentation noted. Will have redrawn ASAP and continue with Unasyn.                 Signed:  Conner Norris NP

## 2020-07-12 NOTE — PROGRESS NOTES
07/12/20 0146   Dual Skin Pressure Injury Assessment   Dual Skin Pressure Injury Assessment WDL   Second Care Provider (Based on [address] [place] [address]) Mendez Monroy RN    Skin Integumentary   Skin Integumentary (WDL) WDL   Skin Color Appropriate for ethnicity   Skin Condition/Temp Warm;Dry   Skin Integrity Intact   Wound Prevention and Protection Methods   Orientation of Wound Prevention Posterior   Location of Wound Prevention Sacrum/Coccyx   Dressing Present  Yes; Intact, not due to be changed   Dressing Status Intact   Wound Offloading (Prevention Methods) Bed, pressure reduction mattress;Repositioning;Pillows; Foam silicone

## 2020-07-12 NOTE — PROGRESS NOTES
TRANSFER - OUT REPORT:    Verbal report given to Conrado Domingo RN(name) on Vaishali Kidd  being transferred to [address] [place] [address] 50 30 (unit) for routine progression of care       Report consisted of patients Situation, Background, Assessment and   Recommendations(SBAR). Information from the following report(s) Intake/Output, MAR, Recent Results and Cardiac Rhythm NSR was reviewed with the receiving nurse. Lines:   Peripheral IV 07/09/20 Left Hand (Active)   Site Assessment Clean, dry, & intact 07/11/20 2300   Phlebitis Assessment 0 07/11/20 2300   Infiltration Assessment 0 07/11/20 2300   Dressing Status Clean, dry, & intact 07/11/20 2300   Dressing Type Tape;Transparent 07/11/20 2300   Hub Color/Line Status Flushed;Patent 07/11/20 2300   Alcohol Cap Used No 07/11/20 1501       Peripheral IV 07/11/20 Left Arm (Active)   Site Assessment Clean, dry, & intact 07/11/20 2300   Phlebitis Assessment 0 07/11/20 2300   Infiltration Assessment 0 07/11/20 2300   Dressing Status Clean, dry, & intact 07/11/20 2300   Dressing Type Tape;Transparent 07/11/20 2300   Hub Color/Line Status Infusing;Patent 07/11/20 2300   Alcohol Cap Used No 07/11/20 1641        Opportunity for questions and clarification was provided.       Patient transported with:   Registered Nurse

## 2020-07-12 NOTE — PROGRESS NOTES
Patient is refusing for calixto to be discontinued. He states that he can't pee sitting down and the doctors and I do not own him. He stated you better not touch me. Risks of infection explained to patient but continued to refuse. MD notified of patient refusing to have calixto removed.

## 2020-07-12 NOTE — PROGRESS NOTES
Problem: Dysphagia (Adult)  Goal: *Acute Goals and Plan of Care (Insert Text)  Description: ST. Pt. Will tolerate PO trials with SLP with no overt s/sx of aspiration/penetration. 2. Pt. Will participate in modified barium swallow with 100% participation to fully evaluate swallow function if further indicated in plan of care. LTG: Pt. Will tolerate least restrictive diet without respiratory decline. Outcome: Progressing Towards Goal   SPEECH LANGUAGE PATHOLOGY: DYSPHAGIA- Daily Note 1    NAME/AGE/GENDER: Isaac Marinelli is a 77 y.o. male  DATE: 2020  PRIMARY DIAGNOSIS: Status epilepticus ([facility] [address] [G40.901]  Status epilepticus ([facility] [address] [G40.901]      ICD-10: Treatment Diagnosis: R13.12 Dysphagia, Oropharyngeal Phase    RECOMMENDATIONS   DIET:    NPO    MEDICATIONS: Non-oral     ASPIRATION PRECAUTIONS  · Oral care     COMPENSATORY STRATEGIES/MODIFICATIONS  · None     EDUCATION:  · Recommendations discussed with Nursing  · Patient     CONTINUATION OF SKILLED SERVICES/MEDICAL NECESSITY:   Patient is expected to demonstrate progress in  swallow strength, swallow timeliness, swallow function, diet tolerance, and swallow safety in order to  improve swallow safety, work toward diet advancement, and decrease aspiration risk.  Patient continues to require skilled intervention due to dysphagia. RECOMMENDATIONS for CONTINUED SPEECH THERAPY:   YES: Anticipate need for ongoing speech therapy during this hospitalization and at next level of care. ASSESSMENT   Patient continues with multiple effortful swallows with each presentation of PO. Wet, congested baseline. Immediate, strong cough present with each presentation. Recommend continue strict NPO. May benefit from alternate means of nutrition given no significant improvement in swallow function even with improved alertness levels this AM.  Follow for PO trials at bedside.      REHABILITATION POTENTIAL FOR STATED GOALS: Fair    PLAN FREQUENCY/DURATION: Continue to follow patient 3 times a week for duration of hospital stay to address above goals. - Recommendations for next treatment session: Next treatment will address PO trials    SUBJECTIVE   Increased alertness. History of Present Injury/Illness: Mr. Zane Saha  has a past medical history of Diabetes ([address] and Hypertension. Kris Liriano He also  has no past surgical history on file. Problem List:  (Impairments causing functional limitations):  1. dysphagia    Previous Dysphagia: NONE REPORTED  Diet Prior to Evaluation: NPO    Orientation:   Person    Pain: Pain Scale 1: Numeric (0 - 10)  Pain Intensity 1: 0    Cognitive-Linguistic Screen:   Speech Production:   o Impaired   Expressive Language:  o Impaired  o     Receptive Language:  o Impaired  o     Cognition:   o Impaired  o  Needs further assessment     OBJECTIVE   Oral Motor:   · Labial: Decreased rate  · Oral Hygiene: Dry  · Lingual: Decreased rate    Swallow evaluation:   Patient more alert and asking for PO - states he is hungry and has not eaten for 2 days. Patient consumed trials of ice chip, thin liquid via tsp, honey thick liquid via tsp and applesauce. Delayed oral prep, noted tongue pumping, multiple effortful swallows with each presentation. Immediate, strong cough present with ice chip and tsp thin liquid. Delayed cough present with honey thick liquids. Immediate, strong coughing episode with applesauce. Increased WOB noted with presentations, worsening with thicker consistencies. Poor ability to clear secretions. INTERDISCIPLINARY COLLABORATION: Speech Therapist and Registered Nurse  PRECAUTIONS/ALLERGIES: Patient has no known allergies.     Tool Used: Dysphagia Outcome and Severity Scale (DEANNE)    Score Comments   Normal Diet  [] 7 With no strategies or extra time needed   Functional Swallow  [] 6 May have mild oral or pharyngeal delay   Mild Dysphagia  [] 5 Which may require one diet consistency restricted Mild-Moderate Dysphagia  [] 4 With 1-2 diet consistencies restricted   Moderate Dysphagia  [] 3 With 2 or more diet consistencies restricted   Moderate-Severe Dysphagia  [] 2 With partial PO strategies (trials with ST only)   Severe Dysphagia  [x] 1 With inability to tolerate any PO safely      Score:  Initial: 1 Most Recent: n/a (Date 07/12/20 )   Interpretation of Tool: The Dysphagia Outcome and Severity Scale (DEANNE) is a simple, easy-to-use, 7-point scale developed to systematically rate the functional severity of dysphagia based on objective assessment and make recommendations for diet level, independence level, and type of nutrition. Current Medications:   No current facility-administered medications on file prior to encounter. Current Outpatient Medications on File Prior to Encounter   Medication Sig Dispense Refill    alogliptin (NESINA) 6.25 mg tablet Take 12.5 mg by mouth.  amantadine HCl (SYMMETREL) 100 mg capsule Take 100 mg by mouth.  atorvastatin (LIPITOR) 40 mg tablet Take 20 mg by mouth.  LORazepam (ATIVAN) 0.5 mg tablet Take  by mouth.  tamsulosin (FLOMAX) 0.4 mg capsule Take 0.4 mg by mouth.  levETIRAcetam (KEPPRA) 750 mg tablet Take 1 Tab by mouth two (2) times a day. 60 Tab 0    magnesium oxide (MAG-OX) 400 mg tablet Take 1 Tab by mouth two (2) times a day. 60 Tab 0    albuterol (PROVENTIL HFA, VENTOLIN HFA, PROAIR HFA) 90 mcg/actuation inhaler Take 2 Puffs by inhalation every six (6) hours as needed for Wheezing. 1 Inhaler 0    risperiDONE (RISPERDAL M-TAB) 2 mg disintegrating tablet 1 tab every 8 hours as needed for agitation 20 Tab 0    metFORMIN (GLUCOPHAGE) 1,000 mg tablet Take 1,000 mg by mouth two (2) times a day.  glipiZIDE (GLUCOTROL) 10 mg tablet Take 10 mg by mouth two (2) times a day.  divalproex ER (DEPAKOTE ER) 250 mg ER tablet Take 250 mg by mouth two (2) times a day.  sAXagliptin (ONGLYZA) 5 mg tab tablet Take  by mouth daily.  diphenhydrAMINE (BENADRYL) 25 mg capsule Take 25 mg by mouth three (3) times daily.  polyethylene glycol (MIRALAX) 17 gram packet Take 17 g by mouth daily.  lisinopril (PRINIVIL, ZESTRIL) 10 mg tablet Take 10 mg by mouth daily. Take 1 half tab every day by mouth      propranolol (INDERAL) 10 mg tablet Take  by mouth daily.  aspirin 81 mg chewable tablet Take 81 mg by mouth three (3) times daily as needed for Pain (as needed for pain or fever). After treatment position/precautions:  · Upright in bed    Total Treatment Duration:   Time In: 0732  Time Out: [facility].  MS Melchor, CCC-SLP

## 2020-07-12 NOTE — PROGRESS NOTES
TRANSFER - IN REPORT:    Verbal report received from Yoni Navarrete RN (name) on Sophie Farrell  being received from ICU (unit) for routine progression of care      Report consisted of patients Situation, Background, Assessment and   Recommendations(SBAR). Information from the following report(s) SBAR, Kardex, Intake/Output, MAR and Recent Results was reviewed with the receiving nurse. Opportunity for questions and clarification was provided. Assessment to be completed upon patients arrival to unit and care assumed.

## 2020-07-12 NOTE — PROGRESS NOTES
Problem: Falls - Risk of  Goal: *Absence of Falls  Description: Document Donata Carrel Fall Risk and appropriate interventions in the flowsheet. Outcome: Progressing Towards Goal  Note: Fall Risk Interventions:       Mentation Interventions: Adequate sleep, hydration, pain control, Bed/chair exit alarm, Door open when patient unattended, Increase mobility, More frequent rounding, Reorient patient, Update white board    Medication Interventions: Bed/chair exit alarm, Evaluate medications/consider consulting pharmacy, Teach patient to arise slowly, Patient to call before getting OOB    Elimination Interventions: Call light in reach, Bed/chair exit alarm, Patient to call for help with toileting needs, Stay With Me (per policy)              Problem: Patient Education: Go to Patient Education Activity  Goal: Patient/Family Education  Outcome: Progressing Towards Goal     Problem: Pressure Injury - Risk of  Goal: *Prevention of pressure injury  Description: Document Jeffery Scale and appropriate interventions in the flowsheet.   Outcome: Progressing Towards Goal  Note: Pressure Injury Interventions:  Sensory Interventions: Assess changes in LOC, Assess need for specialty bed, Check visual cues for pain, Discuss PT/OT consult with provider, Keep linens dry and wrinkle-free, Pressure redistribution bed/mattress (bed type), Pad between skin to skin         Activity Interventions: Increase time out of bed, Pressure redistribution bed/mattress(bed type), PT/OT evaluation    Mobility Interventions: HOB 30 degrees or less, Pressure redistribution bed/mattress (bed type), PT/OT evaluation    Nutrition Interventions: Document food/fluid/supplement intake, Offer support with meals,snacks and hydration    Friction and Shear Interventions: Foam dressings/transparent film/skin sealants, Minimize layers, Transferring/repositioning devices                Problem: Patient Education: Go to Patient Education Activity  Goal: Patient/Family Education  Outcome: Progressing Towards Goal     Problem: Delirium Treatment  Goal: *Level of consciousness restored to baseline  Outcome: Progressing Towards Goal  Goal: *Level of environmental perceptions restored to baseline  Outcome: Progressing Towards Goal  Goal: *Sensory perception restored to baseline  Outcome: Progressing Towards Goal  Goal: *Emotional stability restored to baseline  Outcome: Progressing Towards Goal  Goal: *Functional assessment restored to baseline  Outcome: Progressing Towards Goal  Goal: *Absence of falls  Outcome: Progressing Towards Goal  Goal: *Will remain free of delirium, CAM Score negative  Outcome: Progressing Towards Goal  Goal: *Cognitive status will be restored to baseline  Outcome: Progressing Towards Goal  Goal: Interventions  Outcome: Progressing Towards Goal     Problem: Patient Education: Go to Patient Education Activity  Goal: Patient/Family Education  Outcome: Progressing Towards Goal     Problem: Seizure Disorder (Adult)  Goal: *STG: Remains free of seizure activity  Outcome: Progressing Towards Goal  Goal: *STG: Maintains lab values within therapeutic range  Outcome: Progressing Towards Goal  Goal: *STG/LTG: Complies with medication therapy  Outcome: Progressing Towards Goal  Goal: *STG: Remains free of injury during seizure activity  Outcome: Progressing Towards Goal  Goal: *STG: Remains safe in hospital  Outcome: Progressing Towards Goal  Goal: Interventions  Outcome: Progressing Towards Goal     Problem: Patient Education: Go to Patient Education Activity  Goal: Patient/Family Education  Outcome: Progressing Towards Goal

## 2020-07-12 NOTE — PROGRESS NOTES
Nutrition Note    Nutrition:  Reason follow up for assessment: BPA via malnutrition screening tool (unsure/unsure)  Assessment:  Diet order(s): NPO  Food/Nutrition History:  Hx of DM,parkinson's. Presented with seizure. Intubated in ED at [facility] 7/9 and transferred to [facility]. Pt extubated 7/11. He remains NPO s/p SLP evaluation. Orders for NGT placement for medication administration. Anthropometrics: Height 5'7\", Weight Source: Bed, Weight: 71.3 kg (157 lb 3 oz), Body mass index is 24.62 kg/m². Kris Rook BMI class of normal weight. WT / BMI 7/10/2020 7/9/2020 9/11/2019 9/7/2019   WEIGHT 157 lb 3 oz 154 lb 15.7 oz 155 lb 155 lb   Per weights listed in EMR, pt's weight has been relatively stable over the past 10 months. Macronutrient needs: (revised)  · EER:  8684-1432 kcal /day (20-25 kcal/kg bedscale BW)(71.3 kg)  · EPR:  73-80 grams protein/day (0.8-1 grams/kg bedscale BW)(71.3 kg)  Pertinent labs:  Lab Results   Component Value Date/Time    Potassium 3.5 07/12/2020 06:47 AM    Sodium 137 07/12/2020 06:47 AM    Phosphorus 2.7 07/12/2020 06:47 AM    Magnesium 2.0 07/12/2020 06:47 AM    Glucose 345 (H) 07/12/2020 06:47 AM    Creatinine 1.10 07/12/2020 06:47 AM    Hemoglobin A1c 7.3 (H) 07/12/2020 06:47 AM     Nutrition Diagnosis:   Inadequate oral intake related to dysphagia as evidenced by NPO s/p SLP evaluation. Intervention:   Meals and snacks: NPO  Nutrition Supplement Therapy: Activate Nutrition Support Orders for Electrolyte Replacement. EN: Recommend initiation of TF once NGT is placed. Please consult nutrition services for management of TF, if warranted. Coordination of care: POC discussed with Paula Lopez NP via perfect serve  Nutrition discharge plan:  Too soon to determine    Electronically signed by Ez Alvar[address], [address] [place] [address], [facility] [address] [place] [address], [facility] [address] on 7/12/2020 at 9:32 AM

## 2020-07-12 NOTE — PROGRESS NOTES
spoke with Suze Jones nurse and pt unable to complete MRI screening. No other family to complete. I told her if exam is necessary, can follow no family protocol.  7-12-20 231pm jh

## 2020-07-12 NOTE — PROGRESS NOTES
Problem: Mobility Impaired (Adult and Pediatric)  Goal: *Acute Goals and Plan of Care (Insert Text)  Outcome: Progressing Towards Goal  Note: Goals:  (1.)Mr. Radu Anton will move from supine to sit and sit to supine , scoot up and down, and roll side to side with CONTACT GUARD ASSIST within 4 treatment day(s). (2.)Mr. Radu Anton will transfer from bed to chair and chair to bed with CONTACT GUARD ASSIST using the least restrictive device within 4 treatment day(s). (3.)Mr. Radu Anton will participate in BLE AROM strength exercises in supine and sitting to improve functional mobility within 4 treatment day(s). (4.)Mr. Radu Anton will be assessed for gait as appropriate once standing and SPTs have been achieved. PHYSICAL THERAPY: Initial Assessment and AM 7/12/2020  INPATIENT:    [place] / [place] / Product Type: Medicare /       NAME/AGE/GENDER: Elvin White is a 77 y.o. male   PRIMARY DIAGNOSIS: Status epilepticus ([address] [G40.901]  Status epilepticus ([address] [G40.901] Status epilepticus ([address] Status epilepticus ([address]        ICD-10: Treatment Diagnosis:    Generalized Muscle Weakness (M62.81)  Difficulty in walking, Not elsewhere classified (R26.2)  Other abnormalities of gait and mobility (R26.89)   Precaution/Allergies:  Patient has no known allergies. ASSESSMENT:     Mr. Radu Anton is a 77year old WM with an admitting diagnosis of Status epilepticus. His PMH includes Parkinson's and Hx of epilepsy and schizophrenia. He presents today sitting up in the bed with him stating he wants something to eat and that he is tired of coughing. He is slow with cognitive processing and slow to respond. He is oriented to his name and where he is. He reports he lives in an assisted living and he primarily uses a w/c for mobility most of the time. He states he can help with standing for transfers with the care staff.   His RUE arm/hand appear swollen but he moves and uses both UEs functionally. His BLE AAROM is WFLs but his legs are stiff to move secondary to his Parkinson's. He required mod/max assist for supine to sit with good static sitting and fair dynamic. Sit to stand was mod/max assist with use of bed elevation to assist and the RW for UE support. He needed additional time to attempt full upright standing with verbal and manual cues. He stood ~ 1 minute, sat, rested and stood a 2nd time with mod/max assist with the RW for another minute of standing. He returned to sit and then back to supine with max assist and was positioned for comfort with max assist.  He was kind and cooperative and would benefit from continued skilled PT to maximize his functional abilities while in the hospital.     This section established at most recent assessment   PROBLEM LIST (Impairments causing functional limitations):  Decreased Strength  Decreased ADL/Functional Activities  Decreased Transfer Abilities  Decreased Ambulation Ability/Technique  Decreased Balance  Decreased Activity Tolerance  Increased Fatigue   INTERVENTIONS PLANNED: (Benefits and precautions of physical therapy have been discussed with the patient.)  Bed Mobility  Therapeutic Activites  Therapeutic Exercise/Strengthening  Transfer Training     TREATMENT PLAN: Frequency/Duration: 3 times a week for duration of hospital stay  Rehabilitation Potential For Stated Goals: [facility] (at time of discharge pending progress):    Placement: It is my opinion, based on this patient's performance to date, that Mr. Nain Allan may benefit from participating in 1-2 additional therapy sessions in order to continue to assess for rehab potential and then make recommendation for disposition at discharge.   Equipment:   None at this time              HISTORY:   History of Present Injury/Illness (Reason for Referral):  Patient is a assisted living resident with seizure history on 2 agent (keepra and valproic acid)/DM/HTN BIBA for seizure. DR. Castle felt possible ?post ictal. Initially was responsive to him but then had witnessed seizure by staff and given ativan. Not very alert with worsening oxygenation, and acidosis on ABG. He then intubated the patient. BP low and given Neosynephroine. Then another seizure noted and given ativan and loaded keepra at 60 mg/kg. Past Medical History/Comorbidities:   Mr. Lalo Piña  has a past medical history of Diabetes ([address] and Hypertension. Mr. Lalo Piña  has no past surgical history on file. Social History/Living Environment:   Home Environment: Assisted living  One/Two Story Residence: One story  Living Alone: Yes  Support Systems: Home care staff  Patient Expects to be Discharged to[de-identified] Assisted living  Current DME Used/Available at Home: Wheelchair, Walker, rolling  Prior Level of Function/Work/Activity:       Patient reports he primarily uses a w/c and only assists with standing and transferring. Number of Personal Factors/Comorbidities that affect the Plan of Care: 1-2: MODERATE COMPLEXITY   EXAMINATION:   Most Recent Physical Functioning:   Gross Assessment:  AROM: Generally decreased, functional  PROM: Generally decreased, functional  Strength: Generally decreased, functional  Coordination: Grossly decreased, non-functional  Tone: Normal(Stiff with movments)  Sensation: Intact               Posture:  Posture (WDL): Exceptions to WDL  Posture Assessment: Forward head, Rounded shoulders, Kyphosis  Balance:  Sitting: Impaired  Sitting - Static: Good (unsupported)  Sitting - Dynamic: Fair (occasional)  Standing: Impaired  Standing - Static: Poor;Constant support  Standing - Dynamic : Poor;Constant support Bed Mobility:  Rolling:  Moderate assistance  Supine to Sit: Moderate assistance;Maximum assistance  Sit to Supine: Maximum assistance  Scooting: Maximum assistance  Wheelchair Mobility:     Transfers:  Sit to Stand: Maximum assistance  Stand to Sit: Moderate assistance  Gait:   NT - patient primarily functions with use of a w/c at the assisted living         Body Structures Involved:  Joints  Muscles Body Functions Affected:  Sensory/Pain  Neuromusculoskeletal  Movement Related Activities and Participation Affected:  General Tasks and Demands  Mobility  Self Care   Number of elements that affect the Plan of Care: 3: MODERATE COMPLEXITY   CLINICAL PRESENTATION:   Presentation: Evolving clinical presentation with changing clinical characteristics: MODERATE COMPLEXITY   CLINICAL DECISION MAKIN[address] [place] [address] AM-PAC [id number] [facility] Mobility Inpatient Short Form  How much difficulty does the patient currently have. .. Unable A Lot A Little None   1. Turning over in bed (including adjusting bedclothes, sheets and blankets)? [] 1   [] 2   [x] 3   [] 4   2. Sitting down on and standing up from a chair with arms ( e.g., wheelchair, bedside commode, etc.)   [] 1   [x] 2   [] 3   [] 4   3. Moving from lying on back to sitting on the side of the bed? [] 1   [x] 2   [] 3   [] 4   How much help from another person does the patient currently need. .. Total A Lot A Little None   4. Moving to and from a bed to a chair (including a wheelchair)? [] 1   [x] 2   [] 3   [] 4   5. Need to walk in hospital room? [x] 1   [] 2   [] 3   [] 4   6. Climbing 3-5 steps with a railing? [x] 1   [] 2   [] 3   [] 4   © , Trustees of [address] [place] [address], under license to [employer]. All rights reserved      Score:  Initial: 11 Most Recent: X (Date: -- )    Interpretation of Tool:  Represents activities that are increasingly more difficult (i.e. Bed mobility, Transfers, Gait). Medical Necessity:     Patient is expected to demonstrate progress in   functional technique   to   decrease assistance required with bed mobility and SPT. Short distance gait to be assessed  . Reason for Services/Other Comments:  Patient continues to require skilled intervention due to   medical complications  .    Use of outcome tool(s) and clinical judgement create a POC that gives a: Questionable prediction of patient's progress: MODERATE COMPLEXITY            TREATMENT:   (In addition to Assessment/Re-Assessment sessions the following treatments were rendered)   Pre-treatment Symptoms/Complaints:  Patient had no complaints of pain but states he has a bad cough  Pain: Initial:     0/10 Post Session:  0/10   PT Initial Assessment    Therapeutic Activity: (   13 minuts): Therapeutic activities including Bed transfers and sit to stand and standing to improve mobility and strength. Required moderate assist and cues to promote static and dynamic balance in sitting and standing and to promote motor control of bilateral.     Braces/Orthotics/Lines/Etc:   IV  aclixto catheter  O2 Device: Nasal cannula 3L with resting sats at 95% with activity  Treatment/Session Assessment:    Response to Treatment:  Patient participated but was focused more on his cough and stated he was hungry and wanted to eat. He is stiff and moves very slow with all activity secondary to his Parkinson's  Interdisciplinary Collaboration:   Physical Therapist  Registered Nurse  Certified Nursing Assistant/Patient Care Technician  After treatment position/precautions:   Supine in bed  Bed in low position  Call light within reach  RN notified   Compliance with Program/Exercises: Will assess as treatment progresses  Recommendations/Intent for next treatment session: \"Next visit will focus on advancements to more challenging activities and reduction in assistance provided\".   Total Treatment Duration:  PT Patient Time In/Time Out  Time In: 0101  Time Out: 1415 [address]

## 2020-07-12 NOTE — PROGRESS NOTES
CM received consult for discharge planning. Patient lives in an assisted living. He uses a wc to ambulate. He typically needs assistance with bathing but dresses and feeds himself. CM consulted PT.

## 2020-07-12 NOTE — PROGRESS NOTES
Problem: Falls - Risk of  Goal: *Absence of Falls  Description: Document Mary Ruts Fall Risk and appropriate interventions in the flowsheet. Outcome: Progressing Towards Goal  Note: Fall Risk Interventions:       Mentation Interventions: Bed/chair exit alarm, Door open when patient unattended, Increase mobility, More frequent rounding, Reorient patient    Medication Interventions: Bed/chair exit alarm, Patient to call before getting OOB, Teach patient to arise slowly    Elimination Interventions: Bed/chair exit alarm, Call light in reach, Patient to call for help with toileting needs, Stay With Me (per policy), Toilet paper/wipes in reach, Toileting schedule/hourly rounds              Problem: Patient Education: Go to Patient Education Activity  Goal: Patient/Family Education  Outcome: Progressing Towards Goal     Problem: Pressure Injury - Risk of  Goal: *Prevention of pressure injury  Description: Document Jeffery Scale and appropriate interventions in the flowsheet.   Outcome: Progressing Towards Goal  Note: Pressure Injury Interventions:  Sensory Interventions: Assess changes in LOC, Avoid rigorous massage over bony prominences    Moisture Interventions: Absorbent underpads, Check for incontinence Q2 hours and as needed    Activity Interventions: Increase time out of bed, Pressure redistribution bed/mattress(bed type), PT/OT evaluation    Mobility Interventions: HOB 30 degrees or less, Pressure redistribution bed/mattress (bed type), PT/OT evaluation    Nutrition Interventions: Document food/fluid/supplement intake, Offer support with meals,snacks and hydration    Friction and Shear Interventions: HOB 30 degrees or less                Problem: Patient Education: Go to Patient Education Activity  Goal: Patient/Family Education  Outcome: Progressing Towards Goal     Problem: Delirium Treatment  Goal: *Level of consciousness restored to baseline  Outcome: Progressing Towards Goal  Goal: *Level of environmental perceptions restored to baseline  Outcome: Progressing Towards Goal  Goal: *Sensory perception restored to baseline  Outcome: Progressing Towards Goal  Goal: *Emotional stability restored to baseline  Outcome: Progressing Towards Goal  Goal: *Functional assessment restored to baseline  Outcome: Progressing Towards Goal  Goal: *Absence of falls  Outcome: Progressing Towards Goal  Goal: *Will remain free of delirium, CAM Score negative  Outcome: Progressing Towards Goal  Goal: *Cognitive status will be restored to baseline  Outcome: Progressing Towards Goal  Goal: Interventions  Outcome: Progressing Towards Goal     Problem: Patient Education: Go to Patient Education Activity  Goal: Patient/Family Education  Outcome: Progressing Towards Goal     Problem: Patient Education: Go to Patient Education Activity  Goal: Patient/Family Education  Outcome: Progressing Towards Goal     Problem: Nutrition Deficit  Goal: *Optimize nutritional status  Outcome: Progressing Towards Goal     Problem: Seizure Disorder (Adult)  Goal: *STG: Remains free of seizure activity  Outcome: Progressing Towards Goal  Goal: *STG: Maintains lab values within therapeutic range  Outcome: Progressing Towards Goal  Goal: *STG/LTG: Complies with medication therapy  Outcome: Progressing Towards Goal  Goal: *STG: Remains free of injury during seizure activity  Outcome: Progressing Towards Goal  Goal: *STG: Remains safe in hospital  Outcome: Progressing Towards Goal  Goal: Interventions  Outcome: Progressing Towards Goal     Problem: Patient Education: Go to Patient Education Activity  Goal: Patient/Family Education  Outcome: Progressing Towards Goal     Problem: Patient Education: Go to Patient Education Activity  Goal: Patient/Family Education  Outcome: Progressing Towards Goal     Problem: Diabetes Self-Management  Goal: *Disease process and treatment process  Description: Define diabetes and identify own type of diabetes; list 3 options for treating diabetes. Outcome: Progressing Towards Goal  Goal: *Incorporating nutritional management into lifestyle  Description: Describe effect of type, amount and timing of food on blood glucose; list 3 methods for planning meals. Outcome: Progressing Towards Goal  Goal: *Incorporating physical activity into lifestyle  Description: State effect of exercise on blood glucose levels. Outcome: Progressing Towards Goal  Goal: *Developing strategies to promote health/change behavior  Description: Define the ABC's of diabetes; identify appropriate screenings, schedule and personal plan for screenings. Outcome: Progressing Towards Goal  Goal: *Using medications safely  Description: State effect of diabetes medications on diabetes; name diabetes medication taking, action and side effects. Outcome: Progressing Towards Goal  Goal: *Monitoring blood glucose, interpreting and using results  Description: Identify recommended blood glucose targets  and personal targets. Outcome: Progressing Towards Goal  Goal: *Prevention, detection, treatment of acute complications  Description: List symptoms of hyper- and hypoglycemia; describe how to treat low blood sugar and actions for lowering  high blood glucose level. Outcome: Progressing Towards Goal  Goal: *Prevention, detection and treatment of chronic complications  Description: Define the natural course of diabetes and describe the relationship of blood glucose levels to long term complications of diabetes.   Outcome: Progressing Towards Goal  Goal: *Developing strategies to address psychosocial issues  Description: Describe feelings about living with diabetes; identify support needed and support network  Outcome: Progressing Towards Goal  Goal: *Insulin pump training  Outcome: Progressing Towards Goal  Goal: *Sick day guidelines  Outcome: Progressing Towards Goal  Goal: *Patient Specific Goal (EDIT GOAL, INSERT TEXT)  Outcome: Progressing Towards Goal     Problem: Patient Education: Go to Patient Education Activity  Goal: Patient/Family Education  Outcome: Progressing Towards Goal

## 2020-07-13 ENCOUNTER — PATIENT OUTREACH (OUTPATIENT)
Dept: CASE MANAGEMENT | Age: 75
End: 2020-07-13

## 2020-07-13 ENCOUNTER — APPOINTMENT (OUTPATIENT)
Dept: GENERAL RADIOLOGY | Age: 75
DRG: 100 | End: 2020-07-13
Attending: NURSE PRACTITIONER
Payer: MEDICARE

## 2020-07-13 LAB
GLUCOSE BLD STRIP.AUTO-MCNC: 123 MG/DL (ref 65–100)
PHENYTOIN FREE SERPL-MCNC: ABNORMAL UG/ML (ref 1–2)
PHENYTOIN SERPL-MCNC: 1.5 UG/ML (ref 10–20)
VALPROATE FREE SERPL-MCNC: 14.4 UG/ML (ref 6–22)

## 2020-07-13 PROCEDURE — 99232 SBSQ HOSP IP/OBS MODERATE 35: CPT | Performed by: PSYCHIATRY & NEUROLOGY

## 2020-07-13 PROCEDURE — 99232 SBSQ HOSP IP/OBS MODERATE 35: CPT | Performed by: INTERNAL MEDICINE

## 2020-07-13 PROCEDURE — 74011250636 HC RX REV CODE- 250/636: Performed by: INTERNAL MEDICINE

## 2020-07-13 PROCEDURE — 74011000258 HC RX REV CODE- 258: Performed by: INTERNAL MEDICINE

## 2020-07-13 PROCEDURE — 92526 ORAL FUNCTION THERAPY: CPT

## 2020-07-13 PROCEDURE — 65660000000 HC RM CCU STEPDOWN

## 2020-07-13 PROCEDURE — 74011250637 HC RX REV CODE- 250/637: Performed by: INTERNAL MEDICINE

## 2020-07-13 PROCEDURE — 74011250637 HC RX REV CODE- 250/637: Performed by: PSYCHIATRY & NEUROLOGY

## 2020-07-13 PROCEDURE — 71045 X-RAY EXAM CHEST 1 VIEW: CPT

## 2020-07-13 PROCEDURE — 74011000258 HC RX REV CODE- 258: Performed by: PSYCHIATRY & NEUROLOGY

## 2020-07-13 PROCEDURE — 74011250636 HC RX REV CODE- 250/636: Performed by: PSYCHIATRY & NEUROLOGY

## 2020-07-13 PROCEDURE — 82962 GLUCOSE BLOOD TEST: CPT

## 2020-07-13 RX ORDER — CARBIDOPA AND LEVODOPA 25; 100 MG/1; MG/1
1 TABLET ORAL 3 TIMES DAILY
Status: DISCONTINUED | OUTPATIENT
Start: 2020-07-13 | End: 2020-07-21 | Stop reason: HOSPADM

## 2020-07-13 RX ADMIN — FOSPHENYTOIN 100 MG PE: 50 INJECTION INTRAMUSCULAR; INTRAVENOUS at 05:18

## 2020-07-13 RX ADMIN — FOSPHENYTOIN 100 MG PE: 50 INJECTION INTRAMUSCULAR; INTRAVENOUS at 22:24

## 2020-07-13 RX ADMIN — LEVETIRACETAM 1500 MG: 500 INJECTION, SOLUTION INTRAVENOUS at 08:18

## 2020-07-13 RX ADMIN — AMPICILLIN SODIUM AND SULBACTAM SODIUM 3 G: 2; 1 INJECTION, POWDER, FOR SOLUTION INTRAMUSCULAR; INTRAVENOUS at 20:35

## 2020-07-13 RX ADMIN — HEPARIN SODIUM 5000 UNITS: 5000 INJECTION INTRAVENOUS; SUBCUTANEOUS at 05:18

## 2020-07-13 RX ADMIN — FOSPHENYTOIN 100 MG PE: 50 INJECTION INTRAMUSCULAR; INTRAVENOUS at 14:37

## 2020-07-13 RX ADMIN — AMPICILLIN SODIUM AND SULBACTAM SODIUM 3 G: 2; 1 INJECTION, POWDER, FOR SOLUTION INTRAMUSCULAR; INTRAVENOUS at 08:39

## 2020-07-13 RX ADMIN — Medication 10 ML: at 13:47

## 2020-07-13 RX ADMIN — LEVETIRACETAM 1500 MG: 500 INJECTION, SOLUTION INTRAVENOUS at 19:50

## 2020-07-13 RX ADMIN — Medication 10 ML: at 22:39

## 2020-07-13 RX ADMIN — SODIUM CHLORIDE, SODIUM LACTATE, POTASSIUM CHLORIDE, AND CALCIUM CHLORIDE 75 ML/HR: 600; 310; 30; 20 INJECTION, SOLUTION INTRAVENOUS at 00:34

## 2020-07-13 RX ADMIN — HEPARIN SODIUM 5000 UNITS: 5000 INJECTION INTRAVENOUS; SUBCUTANEOUS at 22:24

## 2020-07-13 RX ADMIN — ATORVASTATIN CALCIUM 40 MG: 40 TABLET, FILM COATED ORAL at 22:24

## 2020-07-13 RX ADMIN — AMPICILLIN SODIUM AND SULBACTAM SODIUM 3 G: 2; 1 INJECTION, POWDER, FOR SOLUTION INTRAMUSCULAR; INTRAVENOUS at 13:47

## 2020-07-13 RX ADMIN — AMPICILLIN SODIUM AND SULBACTAM SODIUM 3 G: 2; 1 INJECTION, POWDER, FOR SOLUTION INTRAMUSCULAR; INTRAVENOUS at 02:17

## 2020-07-13 RX ADMIN — CARBIDOPA AND LEVODOPA 1 TABLET: 25; 100 TABLET ORAL at 22:24

## 2020-07-13 RX ADMIN — Medication 5 ML: at 05:18

## 2020-07-13 RX ADMIN — Medication 10 ML: at 22:27

## 2020-07-13 RX ADMIN — SODIUM CHLORIDE, SODIUM LACTATE, POTASSIUM CHLORIDE, AND CALCIUM CHLORIDE 75 ML/HR: 600; 310; 30; 20 INJECTION, SOLUTION INTRAVENOUS at 16:19

## 2020-07-13 RX ADMIN — CARBIDOPA AND LEVODOPA 1 TABLET: 25; 100 TABLET ORAL at 16:32

## 2020-07-13 NOTE — PROGRESS NOTES
Problem: Falls - Risk of  Goal: *Absence of Falls  Description: Document Devere [place] Fall Risk and appropriate interventions in the flowsheet. Outcome: Progressing Towards Goal  Note: Fall Risk Interventions:       Mentation Interventions: Bed/chair exit alarm, Door open when patient unattended, Increase mobility, More frequent rounding, Reorient patient    Medication Interventions: Bed/chair exit alarm, Patient to call before getting OOB, Teach patient to arise slowly    Elimination Interventions: Bed/chair exit alarm, Call light in reach, Patient to call for help with toileting needs, Stay With Me (per policy), Toilet paper/wipes in reach, Toileting schedule/hourly rounds              Problem: Patient Education: Go to Patient Education Activity  Goal: Patient/Family Education  Outcome: Progressing Towards Goal     Problem: Pressure Injury - Risk of  Goal: *Prevention of pressure injury  Description: Document Jeffery Scale and appropriate interventions in the flowsheet.   Outcome: Progressing Towards Goal  Note: Pressure Injury Interventions:  Sensory Interventions: Assess changes in LOC, Avoid rigorous massage over bony prominences    Moisture Interventions: Absorbent underpads, Check for incontinence Q2 hours and as needed    Activity Interventions: Increase time out of bed, Pressure redistribution bed/mattress(bed type), PT/OT evaluation    Mobility Interventions: HOB 30 degrees or less, Pressure redistribution bed/mattress (bed type), PT/OT evaluation    Nutrition Interventions: Document food/fluid/supplement intake, Offer support with meals,snacks and hydration    Friction and Shear Interventions: HOB 30 degrees or less                Problem: Patient Education: Go to Patient Education Activity  Goal: Patient/Family Education  Outcome: Progressing Towards Goal     Problem: Delirium Treatment  Goal: *Level of consciousness restored to baseline  Outcome: Progressing Towards Goal  Goal: *Level of environmental perceptions restored to baseline  Outcome: Progressing Towards Goal  Goal: *Sensory perception restored to baseline  Outcome: Progressing Towards Goal  Goal: *Emotional stability restored to baseline  Outcome: Progressing Towards Goal  Goal: *Functional assessment restored to baseline  Outcome: Progressing Towards Goal  Goal: *Absence of falls  Outcome: Progressing Towards Goal  Goal: *Will remain free of delirium, CAM Score negative  Outcome: Progressing Towards Goal  Goal: *Cognitive status will be restored to baseline  Outcome: Progressing Towards Goal  Goal: Interventions  Outcome: Progressing Towards Goal     Problem: Patient Education: Go to Patient Education Activity  Goal: Patient/Family Education  Outcome: Progressing Towards Goal     Problem: Patient Education: Go to Patient Education Activity  Goal: Patient/Family Education  Outcome: Progressing Towards Goal     Problem: Nutrition Deficit  Goal: *Optimize nutritional status  Outcome: Progressing Towards Goal     Problem: Seizure Disorder (Adult)  Goal: *STG: Remains free of seizure activity  Outcome: Progressing Towards Goal  Goal: *STG: Maintains lab values within therapeutic range  Outcome: Progressing Towards Goal  Goal: *STG/LTG: Complies with medication therapy  Outcome: Progressing Towards Goal  Goal: *STG: Remains free of injury during seizure activity  Outcome: Progressing Towards Goal  Goal: *STG: Remains safe in hospital  Outcome: Progressing Towards Goal  Goal: Interventions  Outcome: Progressing Towards Goal     Problem: Patient Education: Go to Patient Education Activity  Goal: Patient/Family Education  Outcome: Progressing Towards Goal     Problem: Patient Education: Go to Patient Education Activity  Goal: Patient/Family Education  Outcome: Progressing Towards Goal     Problem: Diabetes Self-Management  Goal: *Disease process and treatment process  Description: Define diabetes and identify own type of diabetes; list 3 options for treating diabetes. Outcome: Progressing Towards Goal  Goal: *Incorporating nutritional management into lifestyle  Description: Describe effect of type, amount and timing of food on blood glucose; list 3 methods for planning meals. Outcome: Progressing Towards Goal  Goal: *Incorporating physical activity into lifestyle  Description: State effect of exercise on blood glucose levels. Outcome: Progressing Towards Goal  Goal: *Developing strategies to promote health/change behavior  Description: Define the ABC's of diabetes; identify appropriate screenings, schedule and personal plan for screenings. Outcome: Progressing Towards Goal  Goal: *Using medications safely  Description: State effect of diabetes medications on diabetes; name diabetes medication taking, action and side effects. Outcome: Progressing Towards Goal  Goal: *Monitoring blood glucose, interpreting and using results  Description: Identify recommended blood glucose targets  and personal targets. Outcome: Progressing Towards Goal  Goal: *Prevention, detection, treatment of acute complications  Description: List symptoms of hyper- and hypoglycemia; describe how to treat low blood sugar and actions for lowering  high blood glucose level. Outcome: Progressing Towards Goal  Goal: *Prevention, detection and treatment of chronic complications  Description: Define the natural course of diabetes and describe the relationship of blood glucose levels to long term complications of diabetes.   Outcome: Progressing Towards Goal  Goal: *Developing strategies to address psychosocial issues  Description: Describe feelings about living with diabetes; identify support needed and support network  Outcome: Progressing Towards Goal  Goal: *Insulin pump training  Outcome: Progressing Towards Goal  Goal: *Sick day guidelines  Outcome: Progressing Towards Goal  Goal: *Patient Specific Goal (EDIT GOAL, INSERT TEXT)  Outcome: Progressing Towards Goal     Problem: Patient Education: Go to Patient Education Activity  Goal: Patient/Family Education  Outcome: Progressing Towards Goal

## 2020-07-13 NOTE — PROGRESS NOTES
Occupational Therapy Note:  OT orders received, chart reviewed, and evaluation attempted. Patient declining OT evaluation, functional activity or any mobility at this time. Will check back as schedule permits and patient is available to initiate occupational therapy evaluation.    Thank you for this consult,   Charlene Pedersen, OTR/L

## 2020-07-13 NOTE — PROGRESS NOTES
CM in to see patient. CM stood at door, wore mask, and social distancing was maintained. Patient is alert and oriented to person and place. Patient states that he is willing to go wherever we feel he needs to discharge to, including STR. Patient constantly cursing about family, the hospital and his healing process. Reassurance provided. He does not want CM to update his brother, Shu Garcia. Therapy is following behind CM for therapy evaluation and recommendation. PPD and Covid will need to be ordered if STR is recommended. Based on patient's hostitility, CM will postpone these orders until therapy recommendation placed. Care Management Interventions  PCP Verified by CM: Yes([facility])  Mode of Transport at Discharge: Other (see comment)  Transition of Care Consult (CM Consult): Assisted Living([facility])  Physical Therapy Consult: Yes  Occupational Therapy Consult: Yes  Speech Therapy Consult: Yes  Current Support Network: Assisted Living(brother, Shu Garcia -[phone]/MINHZK have sister, but Shu Garcia takes care of pt.  Cesar Sanz [phone]-)  Confirm Follow Up Transport: Other (see comment)  Freedom of Choice List was Provided with Basic Dialogue that Supports the Patient's Individualized Plan of Care/Goals, Treatment Preferences and Shares the Quality Data Associated with the Providers?: Yes  [facility] [employer] Information Provided?: (MCR/supplemental/gets RX from [place])  Discharge Location  Discharge Placement: Unable to determine at this time

## 2020-07-13 NOTE — PROGRESS NOTES
Date of Outreach Update:  Naina Garcia was seen and assessed. MEWS Score: 1 (07/13/20 0456)  Vitals:    07/12/20 1600 07/12/20 2027 07/13/20 0023 07/13/20 0456   BP: 135/71 150/72 160/67 151/59   Pulse: 72 80 86 88   Resp: 18 17 18 18   Temp: 98.4 °F (36.9 °C) 98.1 °F (36.7 °C) 98 °F (36.7 °C) 98.4 °F (36.9 °C)   SpO2: 98% 100% 97% 94%   Weight:             Pain Assessment  Pain Intensity 1: 0 (07/12/20 2025)        Patient Stated Pain Goal: 0    Patient resting in bed, awake. Pt is angry this morning, cussing at staff. No distress is noted. Chart, labs, VS reviewed. Will continue to follow up per outreach protocol.     Signed By:   Sofy Graff RN    July 13, 2020 5:27 AM

## 2020-07-13 NOTE — PROGRESS NOTES
Problem: Falls - Risk of  Goal: *Absence of Falls  Description: Document Lynda Vaughan Fall Risk and appropriate interventions in the flowsheet. Outcome: Progressing Towards Goal  Note: Fall Risk Interventions:       Mentation Interventions: Adequate sleep, hydration, pain control, Bed/chair exit alarm, Door open when patient unattended, More frequent rounding, Reorient patient, Increase mobility, Update white board    Medication Interventions: Bed/chair exit alarm, Evaluate medications/consider consulting pharmacy, Patient to call before getting OOB, Teach patient to arise slowly    Elimination Interventions: Call light in reach, Bed/chair exit alarm, Patient to call for help with toileting needs, Stay With Me (per policy)              Problem: Patient Education: Go to Patient Education Activity  Goal: Patient/Family Education  Outcome: Progressing Towards Goal     Problem: Pressure Injury - Risk of  Goal: *Prevention of pressure injury  Description: Document Jeffery Scale and appropriate interventions in the flowsheet.   Outcome: Progressing Towards Goal  Note: Pressure Injury Interventions:  Sensory Interventions: Assess changes in LOC, Assess need for specialty bed, Check visual cues for pain, Discuss PT/OT consult with provider, Keep linens dry and wrinkle-free, Pad between skin to skin, Pressure redistribution bed/mattress (bed type)    Moisture Interventions: Absorbent underpads, Check for incontinence Q2 hours and as needed    Activity Interventions: Increase time out of bed, Pressure redistribution bed/mattress(bed type), PT/OT evaluation    Mobility Interventions: HOB 30 degrees or less, PT/OT evaluation, Pressure redistribution bed/mattress (bed type)    Nutrition Interventions: Document food/fluid/supplement intake, Offer support with meals,snacks and hydration    Friction and Shear Interventions: HOB 30 degrees or less                Problem: Patient Education: Go to Patient Education Activity  Goal: Patient/Family Education  Outcome: Progressing Towards Goal     Problem: Delirium Treatment  Goal: *Level of consciousness restored to baseline  Outcome: Progressing Towards Goal  Goal: *Level of environmental perceptions restored to baseline  Outcome: Progressing Towards Goal  Goal: *Sensory perception restored to baseline  Outcome: Progressing Towards Goal  Goal: *Emotional stability restored to baseline  Outcome: Progressing Towards Goal  Goal: *Functional assessment restored to baseline  Outcome: Progressing Towards Goal  Goal: *Absence of falls  Outcome: Progressing Towards Goal  Goal: *Will remain free of delirium, CAM Score negative  Outcome: Progressing Towards Goal  Goal: *Cognitive status will be restored to baseline  Outcome: Progressing Towards Goal  Goal: Interventions  Outcome: Progressing Towards Goal     Problem: Patient Education: Go to Patient Education Activity  Goal: Patient/Family Education  Outcome: Progressing Towards Goal     Problem: Seizure Disorder (Adult)  Goal: *STG: Remains free of seizure activity  Outcome: Progressing Towards Goal  Goal: *STG: Maintains lab values within therapeutic range  Outcome: Progressing Towards Goal  Goal: *STG/LTG: Complies with medication therapy  Outcome: Progressing Towards Goal  Goal: *STG: Remains free of injury during seizure activity  Outcome: Progressing Towards Goal  Goal: *STG: Remains safe in hospital  Outcome: Progressing Towards Goal  Goal: Interventions  Outcome: Progressing Towards Goal     Problem: Patient Education: Go to Patient Education Activity  Goal: Patient/Family Education  Outcome: Progressing Towards Goal     Problem: Diabetes Self-Management  Goal: *Disease process and treatment process  Description: Define diabetes and identify own type of diabetes; list 3 options for treating diabetes.   Outcome: Progressing Towards Goal  Goal: *Incorporating nutritional management into lifestyle  Description: Describe effect of type, amount and timing of food on blood glucose; list 3 methods for planning meals. Outcome: Progressing Towards Goal  Goal: *Incorporating physical activity into lifestyle  Description: State effect of exercise on blood glucose levels. Outcome: Progressing Towards Goal  Goal: *Developing strategies to promote health/change behavior  Description: Define the ABC's of diabetes; identify appropriate screenings, schedule and personal plan for screenings. Outcome: Progressing Towards Goal  Goal: *Using medications safely  Description: State effect of diabetes medications on diabetes; name diabetes medication taking, action and side effects. Outcome: Progressing Towards Goal  Goal: *Monitoring blood glucose, interpreting and using results  Description: Identify recommended blood glucose targets  and personal targets. Outcome: Progressing Towards Goal  Goal: *Prevention, detection, treatment of acute complications  Description: List symptoms of hyper- and hypoglycemia; describe how to treat low blood sugar and actions for lowering  high blood glucose level. Outcome: Progressing Towards Goal  Goal: *Prevention, detection and treatment of chronic complications  Description: Define the natural course of diabetes and describe the relationship of blood glucose levels to long term complications of diabetes.   Outcome: Progressing Towards Goal  Goal: *Developing strategies to address psychosocial issues  Description: Describe feelings about living with diabetes; identify support needed and support network  Outcome: Progressing Towards Goal     Problem: Patient Education: Go to Patient Education Activity  Goal: Patient/Family Education  Outcome: Progressing Towards Goal

## 2020-07-13 NOTE — PROGRESS NOTES
PT Daily Note:  Attempted to see patient for physical therapy this morning but patient cussed us out and refused to participate despite max encouragement to participate. Will check back on patient at a later date/time if schedule permits.   Thank you,  Orlando Grier, PTA

## 2020-07-13 NOTE — PROGRESS NOTES
Problem: Dysphagia (Adult)  Goal: *Acute Goals and Plan of Care (Insert Text)  Description: ST. Pt. Will tolerate PO trials with SLP with no overt s/sx of aspiration/penetration. 2. Pt. Will participate in modified barium swallow with 100% participation to fully evaluate swallow function if further indicated in plan of care. LTG: Pt. Will tolerate least restrictive diet without respiratory decline. Outcome: Progressing Towards Goal   SPEECH LANGUAGE PATHOLOGY: DYSPHAGIA- Daily Note 2    NAME/AGE/GENDER: Chelle Riley is a 77 y.o. male  DATE: 2020  PRIMARY DIAGNOSIS: Status epilepticus ([facility] [address] [G40.901]  Status epilepticus ([facility] [address] [G40.901]      ICD-10: Treatment Diagnosis: R13.12 Dysphagia, Oropharyngeal Phase    RECOMMENDATIONS   DIET:    NPO    MEDICATIONS: Crushed in puree     ASPIRATION PRECAUTIONS  · Slow rate of intake  · Small bites/sips  · Upright at 90 degrees during meal  · Oral care      COMPENSATORY STRATEGIES/MODIFICATIONS  · None     EDUCATION:  · Recommendations discussed with Nursing  · Patient     CONTINUATION OF SKILLED SERVICES/MEDICAL NECESSITY:   Patient is expected to demonstrate progress in  swallow strength, swallow timeliness, swallow function, diet tolerance, and swallow safety in order to  improve swallow safety, work toward diet advancement, and decrease aspiration risk.  Patient continues to require skilled intervention due to dysphagia. RECOMMENDATIONS for CONTINUED SPEECH THERAPY:   YES: Anticipate need for ongoing speech therapy during this hospitalization and at next level of care. ASSESSMENT   Patient alert and agitated about NPO status, but only agreeable to puree. Refused liquids. Difficult to assess swallow function/safety due to limited participation and agitation. Recommend NPO with meds crushed in puree. Will Follow for PO trials at bedside in efforts to determine safest, least restrictive diet.      REHABILITATION POTENTIAL FOR STATED GOALS: Fair    PLAN    FREQUENCY/DURATION: Continue to follow patient 3 times a week for duration of hospital stay to address above goals. - Recommendations for next treatment session: Next treatment will address PO trials    SUBJECTIVE   Increased alertness. Cussing at SLP throughout. \"i'm starving ya'll are trying to kill me\"   Baseline cough. History of Present Injury/Illness: Mr. Verona Suarez  has a past medical history of Diabetes ([address] and Hypertension. Tara Medina He also  has no past surgical history on file. Problem List:  (Impairments causing functional limitations):  1. dysphagia    Previous Dysphagia: NONE REPORTED  Diet Prior to Evaluation: NPO    Orientation:   Person    Pain: Pain Scale 1: Numeric (0 - 10)  Pain Intensity 1: 0  o     OBJECTIVE   Patient seen for po trials. Patient alert and agitated requesting food. Adamantly refused ice and liquid trials despite max encouragement. Attempted to explain role of SLP and importance of participating in liquid trials to work towards initiating diet, however very agitated and continued to refuse liquids. Tolerated about 3oz puree without overt s/sx airway compromise. Occasional double swallow but no significant concerns for reduced pharyngeal clearance. INTERDISCIPLINARY COLLABORATION: Registered Nurse  PRECAUTIONS/ALLERGIES: Patient has no known allergies.     Tool Used: Dysphagia Outcome and Severity Scale (DEANNE)    Score Comments   Normal Diet  [] 7 With no strategies or extra time needed   Functional Swallow  [] 6 May have mild oral or pharyngeal delay   Mild Dysphagia  [] 5 Which may require one diet consistency restricted    Mild-Moderate Dysphagia  [] 4 With 1-2 diet consistencies restricted   Moderate Dysphagia  [] 3 With 2 or more diet consistencies restricted   Moderate-Severe Dysphagia  [] 2 With partial PO strategies (trials with ST only)   Severe Dysphagia  [x] 1 With inability to tolerate any PO safely      Score:  Initial: 1 Most Recent: 2 (Date 07/13/20 )   Interpretation of Tool: The Dysphagia Outcome and Severity Scale (DEANNE) is a simple, easy-to-use, 7-point scale developed to systematically rate the functional severity of dysphagia based on objective assessment and make recommendations for diet level, independence level, and type of nutrition. Current Medications:   No current facility-administered medications on file prior to encounter. Current Outpatient Medications on File Prior to Encounter   Medication Sig Dispense Refill    alogliptin (NESINA) 6.25 mg tablet Take 12.5 mg by mouth.  amantadine HCl (SYMMETREL) 100 mg capsule Take 100 mg by mouth.  atorvastatin (LIPITOR) 40 mg tablet Take 20 mg by mouth.  LORazepam (ATIVAN) 0.5 mg tablet Take  by mouth.  tamsulosin (FLOMAX) 0.4 mg capsule Take 0.4 mg by mouth.  levETIRAcetam (KEPPRA) 750 mg tablet Take 1 Tab by mouth two (2) times a day. 60 Tab 0    magnesium oxide (MAG-OX) 400 mg tablet Take 1 Tab by mouth two (2) times a day. 60 Tab 0    albuterol (PROVENTIL HFA, VENTOLIN HFA, PROAIR HFA) 90 mcg/actuation inhaler Take 2 Puffs by inhalation every six (6) hours as needed for Wheezing. 1 Inhaler 0    risperiDONE (RISPERDAL M-TAB) 2 mg disintegrating tablet 1 tab every 8 hours as needed for agitation 20 Tab 0    metFORMIN (GLUCOPHAGE) 1,000 mg tablet Take 1,000 mg by mouth two (2) times a day.  glipiZIDE (GLUCOTROL) 10 mg tablet Take 10 mg by mouth two (2) times a day.  divalproex ER (DEPAKOTE ER) 250 mg ER tablet Take 250 mg by mouth two (2) times a day.  sAXagliptin (ONGLYZA) 5 mg tab tablet Take  by mouth daily.  diphenhydrAMINE (BENADRYL) 25 mg capsule Take 25 mg by mouth three (3) times daily.  polyethylene glycol (MIRALAX) 17 gram packet Take 17 g by mouth daily.  lisinopril (PRINIVIL, ZESTRIL) 10 mg tablet Take 10 mg by mouth daily.  Take 1 half tab every day by mouth      propranolol (INDERAL) 10 mg tablet Take  by mouth daily.      aspirin 81 mg chewable tablet Take 81 mg by mouth three (3) times daily as needed for Pain (as needed for pain or fever).          After treatment position/precautions:  · Upright in bed   · RN notified    Total Treatment Duration:   Time In: 8943  Time Out: [address] [address] [facility]

## 2020-07-13 NOTE — PROGRESS NOTES
Talked with patient about taking medication in applesauce and how important it is to take our medications and to get our blood sugars checked so we could get him better so he could discharge. Patient repeatedly cussing at this nurse and stating we don't own him and he doesn't have to do anything for us.

## 2020-07-13 NOTE — PROGRESS NOTES
Hospitalist Progress Note    2020  Admit Date: 7/10/2020 12:55 AM   NAME: Mery Guo   :  1945   MRN:  [id number]   Attending: Bob Rodriguez MD  PCP:  Audelia Madden MD    SUBJECTIVE:   77year old CM who presented with status epilepticus, resolved on . Hx of epilepsy, schizophrenia and Parkinson's disease. He was in the ICU initially intubated, extubated  and transferred to the medical floor . No further seizure activity noted. Neurology continues to follow along, remains on Keppra and Dilantin IV. NPO per speech concern for wet cough.  - didn't do well with speech today and remains on meds in applesauce only and he is pretty upset about this. Seems confused but alert, oriented to place only. Review of Systems negative with exception of pertinent positives noted above  PHYSICAL EXAM     Visit Vitals  /72 (BP 1 Location: Left arm, BP Patient Position: At rest)   Pulse 88   Temp 98.8 °F (37.1 °C)   Resp 18   Wt 71.3 kg (157 lb 3 oz)   SpO2 93%   BMI 24.62 kg/m²      Temp (24hrs), Av.3 °F (36.8 °C), Min:98 °F (36.7 °C), Max:98.8 °F (37.1 °C)    Oxygen Therapy  O2 Sat (%): 93 % (20 0800)  Pulse via Oximetry: 75 beats per minute (20 0100)  O2 Device: Nasal cannula (20 230)  O2 Flow Rate (L/min): 3 l/min (20 230)  FIO2 (%): 30 % (20 0905)    Intake/Output Summary (Last 24 hours) at 2020  Last data filed at 2020  Gross per 24 hour   Intake --   Output 1900 ml   Net -1900 ml      General: No acute distress  confused/paranoid/ agitated  Lungs:  CTA Bilaterally. Heart:  Regular rate and rhythm,  No murmur, rub, or gallop  Abdomen: Soft, Non distended, Non tender, Positive bowel sounds  Extremities: No cyanosis, clubbing or edema  Neurologic:  Alert, oriented x 1, no focal deficits.      ASSESSMENT      Active Hospital Problems    Diagnosis Date Noted    Dysphagia 2020    SIRS (systemic inflammatory response syndrome) ([facility] 07/11/2020    DM2 (diabetes mellitus, type 2) ([facility] 07/10/2020    Acute respiratory failure with hypoxia ([facility] 07/10/2020    Essential hypertension 07/10/2020    Status epilepticus ([facility] 07/09/2020       A/P:    1. Status epilepticus resolved with seizure disorder:  Neurology following  Keppra and Dilantin IV  Telemetry  Seizure precautions.     2. Type II DM:  Check A1C  SSI     3. Parkinson's disease:  Continue sinemet. Neuro following     4. Essential HTN:  Inderal, lisinopril  monitor     5. Dysphagia:  ST following  NPO for now  LR 75 ml/h   Hopefull diet can start tomorrow if cleared as doubt he will tolerate NGT     6. New mild bibasilar lung atelectasis or infiltrates:  Continue Unasyn for now  Sputum culture ordered  Repeat procalcitonin and chest x ray in am.  **of note, BC from 7/9 right AC aerobic bottle grew gram  Positive rods, no other documentation noted. Repeat blood cultures pending    7.  Schizophrenia  With behavioral issues tonight  CI's to multiple medications given hx of PD and seizures  Will request tele-psych to assist in advising on prn mood meds    DVT Prophylaxis: scds    Signed By: Jessica Olvera DO     July 13, 2020

## 2020-07-13 NOTE — PROGRESS NOTES
Assessment:     77year old male who presented with status epilepticus, resolved. Hx of epilepsy, schizophrenia and PD. Depacon was discontinued due to worsening tremor. Code S Saturday for slurred speech and right sided facial droop, now resolved. MRI ordered. Plan:     Continue aspirin     Continue statin     Continue Keppra 1000 mg q 12     Continue Fosphenytoin 100 mg IV every 8 hours     MRI of brain if able     Recommend medical management of LICA stenosis    Continue home PD medications and maintain home timing of medications. Do not stop medications. Abrupt discontinuation/reduction of dopaminergic medications can be life threatening. If unable to swallow, give medications via NG tube. Subjective: Interval history:    Patient agitated. Refuses exam. Cursing. History:    Allan Mathews is a 77 y.o. male who is being seen for status epilepticus. Unable to obtain ROS due to AMS.       Objective:     Vitals:    07/12/20 2027 07/13/20 0023 07/13/20 0456 07/13/20 0800   BP: 150/72 160/67 151/59 172/72   Pulse: 80 86 88 88   Resp: 17 18 18 18   Temp: 98.1 °F (36.7 °C) 98 °F (36.7 °C) 98.4 °F (36.9 °C) 98.8 °F (37.1 °C)   SpO2: 100% 97% 94% 93%   Weight:              Current Facility-Administered Medications:     ampicillin-sulbactam (UNASYN) 3 g in 0.9% sodium chloride (MBP/ADV) 100 mL, 3 g, IntraVENous, Q6H, LydiaBryson maciel MD, Last Rate: 200 mL/hr at 07/13/20 0839, 3 g at 07/13/20 0839    lactated Ringers infusion, 75 mL/hr, IntraVENous, CONTINUOUS, Ermelinda Ackerman MD, Last Rate: 75 mL/hr at 07/13/20 0034, 75 mL/hr at 07/13/20 0034    insulin lispro (HUMALOG) injection 0-10 Units, 0-10 Units, SubCUTAneous, AC&HS, Ermelinda Ackerman MD, Stopped at 07/12/20 1130    dextrose 40% (GLUTOSE) oral gel 1 Tube, 15 g, Oral, PRN, Ermelinda Ackerman MD    glucagon [facility]) injection 1 mg, 1 mg, IntraMUSCular, PRN, Ermelinda Ackerman MD    dextrose (D50W) injection syrg 12.5-25 g, 25-50 mL, IntraVENous, PRN, Ness Herron MD    amantadine HCL (SYMMETREL) capsule 100 mg, 100 mg, Per NG tube, DAILY, Valente Gibson MD, Stopped at 07/12/20 0900    atorvastatin (LIPITOR) tablet 40 mg, 40 mg, Per NG tube, QHS, Mally Crespo MD, Stopped at 07/11/20 2200    lisinopriL (PRINIVIL, ZESTRIL) tablet 10 mg, 10 mg, Per NG tube, DAILY, Valente Gibson MD, Stopped at 07/12/20 0900    polyethylene glycol (MIRALAX) packet 17 g, 17 g, Per NG tube, DAILY, Mally Crespo MD, Stopped at 07/12/20 0900    propranoloL (INDERAL) tablet 10 mg, 10 mg, Per NG tube, DAILY, Valente Gibson MD, Stopped at 07/12/20 0900    tamsulosin (FLOMAX) capsule 0.4 mg, 0.4 mg, Oral, DAILY, Sherrie Freeman MD, Stopped at 07/10/20 0900    sodium chloride (NS) flush 5-40 mL, 5-40 mL, IntraVENous, Q8H, Bryson Freeman MD, 5 mL at 07/13/20 0518    sodium chloride (NS) flush 5-40 mL, 5-40 mL, IntraVENous, PRN, Bryson Freeman MD    heparin (porcine) injection 5,000 Units, 5,000 Units, SubCUTAneous, Q8H, Bryson Freeman MD, 5,000 Units at 07/13/20 0518    hydrALAZINE (APRESOLINE) 20 mg/mL injection 10 mg, 10 mg, IntraVENous, Q6H PRN, Young Go MD, 10 mg at 07/10/20 0320    NUTRITIONAL SUPPORT ELECTROLYTE PRN ORDERS, , Does Not Apply, PRN, Sherrie Freeman MD    levETIRAcetam (KEPPRA) 1,500 mg in 0.9% sodium chloride 100 mL IVPB, 1,500 mg, IntraVENous, Q12H, Sharath Marks MD, 1,500 mg at 07/13/20 0818    fosphenytoin (CEREBYX) 100 mg PE in 0.9% sodium chloride 100 mL IVPB, 100 mg PE, IntraVENous, Q8H, McBurney, Polo Sea, MD, Last Rate: 408 mL/hr at 07/13/20 0518, 100 mg PE at 07/13/20 0518    No results found for this or any previous visit (from the past 15 hour(s)).   CT Results (most recent):  Results from Hospital Encounter encounter on 07/10/20   CT PERF W CBF    Narrative EXAMINATION: CT PERFUSION    DATE: 7/11/2020 3:42 PM    INDICATION:  The patient is a 77years year old Male presenting with symptoms of  right sided facial droop    COMPARISON: Head CT 7/11/2020    TECHNIQUE: CT perfusion of the brain was obtained after the administration of  intravenous contrast. Perfusion maps and perfusion analysis output were  generated using the RAPID perfusion processing software algorithm. Radiation  dose reduction techniques were used for this study: All CT scans performed at  this facility use one or all of the following: Automated exposure control,  adjustment of the mA and/or kVp according to patient's size, iterative  reconstruction. Total radiation dose: 8703 mGy-cm    FINDINGS: Study is technically adequate. Adequate vascular enhancement is  demonstrated. RAPID Output Values:     CBF < 30% volume (best correlation with core infarct volume without overcalls):  0 ml (core infarction volume greater than 50 cc associated with poor outcomes)    Tmax > 6 seconds: 0 ml    Tmax/CBF Mismatch Volume: 0 ml    Tmax/CBF Mismatch Ratio: None    Hypoperfusion Intensity Ratio (Tmax > 10 seconds / Tmax > 6 seconds): 0 (values  greater than 0.5 associated with poor outcome)    Tmax > 10 seconds Volume: 0 ml (volume greater than 100 mL is associated with  poor outcome)      Impression IMPRESSION:    No evidence of CT cerebral perfusion defect. Please note that the determination of patient treatment is not based solely upon  imaging factors or calculation values. Management of ischemia is at the  discretion of the primary physician and is based upon a combination of clinical  and imaging data, along other factors. Physical Exam:  General - Disheveled appearance. Agitated. Patient refuses physical exam.       Signed By: Telly Kirby NP     July 13, 2020      Neurology Physician [facility]    I have seen and examined the patient along with Telly Kirby NP. I agree with the documentation as recorded.  In addition I would add:     I have seen the patient independently during which time I reviewed the history and performed a physical examination, reviewed the NP documentation and discussed with the NP . The interval history obtained is notable for: see above very very cognitively impaired poor judgement    The physical examination performed is notable for:marked masking and rt hand tremor c/w PD    The medical decision making including assessment and recommendations is notable for:   Would tend to stop Amantadine and substitute Sinemet 25/100 tid  Agree with sz meds  Overall needs someone as POA do not believe can make healthcare judgements but defer to Behavioral med in that regard.       Aviva Bamberger MD

## 2020-07-13 NOTE — PROGRESS NOTES
Patient continues to be rude and curse at staff. Sinemet in applesauce given per Speech Therapy's note. Patient not happy that he can only have applesauce now after requesting to have applesauce all day.

## 2020-07-13 NOTE — PROGRESS NOTES
Palliative Care Progress Note    Patient: Momo Harper MRN: [id number]  SSN: [id number]    YOB: 1945  Age: 77 y.o. Sex: male       Assessment/Plan:     Chief Complaint/Interval History: pt is alert and now DNR    Principal Diagnosis:    · Debility, Unspecified  R53.81    Additional Diagnoses:   · Counseling, Encounter for Medical Advice  Z71.9  · Encounter for Palliative Care  Z51.5    Palliative Performance Scale (PPS)       Medical Decision Making:   Reviewed and summarized labs and imaging. Introduced myself to pt who was intubated when last seen. Pt not happy with hospitalization and wants to do whatever to get out. Conveys his displeasure with all who are caring for him. DNR in place. Will sign off as pt goals are clear    Will discuss findings with members of the interdisciplinary team.      More than 50% of this 25 minute visit was spent counseling and coordination of care as outlined above. Subjective:     Review of Systems negative with the exception of as noted above     Objective:     Visit Vitals  /72 (BP 1 Location: Left arm, BP Patient Position: At rest)   Pulse 88   Temp 98.8 °F (37.1 °C)   Resp 18   Wt 157 lb 3 oz (71.3 kg)   SpO2 93%   BMI 24.62 kg/m²       Physical Exam:    General:  Cooperative. No acute distress. Eyes:  Conjunctivae/corneas clear    Nose: Nares normal. Septum midline. Neck: Supple, symmetrical, trachea midline, no JVD   Lungs:   Clear to auscultation bilaterally, unlabored   Heart:  Regular rate and rhythm, no murmur    Abdomen:   Soft, non-tender, non-distended   Extremities: Normal, atraumatic, no cyanosis or edema   Skin: Skin color, texture, turgor normal. No rash or lesions.    Neurologic: Nonfocal   Psych: Alert and oriented     Signed By: Des Pichardo MD     July 13, 2020

## 2020-07-14 ENCOUNTER — APPOINTMENT (OUTPATIENT)
Dept: ULTRASOUND IMAGING | Age: 75
DRG: 100 | End: 2020-07-14
Attending: INTERNAL MEDICINE
Payer: MEDICARE

## 2020-07-14 LAB
ANION GAP SERPL CALC-SCNC: 12 MMOL/L (ref 7–16)
BACTERIA SPEC CULT: ABNORMAL
BACTERIA SPEC CULT: ABNORMAL
BACTERIA SPEC CULT: NORMAL
BASOPHILS # BLD: 0 K/UL (ref 0–0.2)
BASOPHILS NFR BLD: 1 % (ref 0–2)
BUN SERPL-MCNC: 12 MG/DL (ref 8–23)
CALCIUM SERPL-MCNC: 8.4 MG/DL (ref 8.3–10.4)
CHLORIDE SERPL-SCNC: 113 MMOL/L (ref 98–107)
CO2 SERPL-SCNC: 20 MMOL/L (ref 21–32)
CREAT SERPL-MCNC: 0.91 MG/DL (ref 0.8–1.5)
DIFFERENTIAL METHOD BLD: ABNORMAL
EOSINOPHIL # BLD: 0.1 K/UL (ref 0–0.8)
EOSINOPHIL NFR BLD: 1 % (ref 0.5–7.8)
ERYTHROCYTE [DISTWIDTH] IN BLOOD BY AUTOMATED COUNT: 13.6 % (ref 11.9–14.6)
GLUCOSE BLD STRIP.AUTO-MCNC: 115 MG/DL (ref 65–100)
GLUCOSE BLD STRIP.AUTO-MCNC: 135 MG/DL (ref 65–100)
GLUCOSE BLD STRIP.AUTO-MCNC: 152 MG/DL (ref 65–100)
GLUCOSE BLD STRIP.AUTO-MCNC: 270 MG/DL (ref 65–100)
GLUCOSE SERPL-MCNC: 109 MG/DL (ref 65–100)
GRAM STN SPEC: ABNORMAL
HCT VFR BLD AUTO: 30.2 % (ref 41.1–50.3)
HGB BLD-MCNC: 9.4 G/DL (ref 13.6–17.2)
IMM GRANULOCYTES # BLD AUTO: 0.1 K/UL (ref 0–0.5)
IMM GRANULOCYTES NFR BLD AUTO: 1 % (ref 0–5)
LYMPHOCYTES # BLD: 1.5 K/UL (ref 0.5–4.6)
LYMPHOCYTES NFR BLD: 17 % (ref 13–44)
MCH RBC QN AUTO: 30.7 PG (ref 26.1–32.9)
MCHC RBC AUTO-ENTMCNC: 31.1 G/DL (ref 31.4–35)
MCV RBC AUTO: 98.7 FL (ref 79.6–97.8)
MONOCYTES # BLD: 0.8 K/UL (ref 0.1–1.3)
MONOCYTES NFR BLD: 9 % (ref 4–12)
NEUTS SEG # BLD: 6.4 K/UL (ref 1.7–8.2)
NEUTS SEG NFR BLD: 72 % (ref 43–78)
NRBC # BLD: 0 K/UL (ref 0–0.2)
PLATELET # BLD AUTO: 190 K/UL (ref 150–450)
PMV BLD AUTO: 10.6 FL (ref 9.4–12.3)
POTASSIUM SERPL-SCNC: 3.6 MMOL/L (ref 3.5–5.1)
RBC # BLD AUTO: 3.06 M/UL (ref 4.23–5.6)
SERVICE CMNT-IMP: ABNORMAL
SERVICE CMNT-IMP: NORMAL
SODIUM SERPL-SCNC: 145 MMOL/L (ref 136–145)
WBC # BLD AUTO: 8.8 K/UL (ref 4.3–11.1)

## 2020-07-14 PROCEDURE — 74011250636 HC RX REV CODE- 250/636: Performed by: PSYCHIATRY & NEUROLOGY

## 2020-07-14 PROCEDURE — 97535 SELF CARE MNGMENT TRAINING: CPT

## 2020-07-14 PROCEDURE — 85025 COMPLETE CBC W/AUTO DIFF WBC: CPT

## 2020-07-14 PROCEDURE — 74011250636 HC RX REV CODE- 250/636: Performed by: INTERNAL MEDICINE

## 2020-07-14 PROCEDURE — 74011636637 HC RX REV CODE- 636/637: Performed by: INTERNAL MEDICINE

## 2020-07-14 PROCEDURE — 74011250637 HC RX REV CODE- 250/637: Performed by: FAMILY MEDICINE

## 2020-07-14 PROCEDURE — 74011250637 HC RX REV CODE- 250/637: Performed by: PSYCHIATRY & NEUROLOGY

## 2020-07-14 PROCEDURE — 97166 OT EVAL MOD COMPLEX 45 MIN: CPT

## 2020-07-14 PROCEDURE — 82962 GLUCOSE BLOOD TEST: CPT

## 2020-07-14 PROCEDURE — 92526 ORAL FUNCTION THERAPY: CPT

## 2020-07-14 PROCEDURE — 80048 BASIC METABOLIC PNL TOTAL CA: CPT

## 2020-07-14 PROCEDURE — 93971 EXTREMITY STUDY: CPT

## 2020-07-14 PROCEDURE — 36415 COLL VENOUS BLD VENIPUNCTURE: CPT

## 2020-07-14 PROCEDURE — 97530 THERAPEUTIC ACTIVITIES: CPT

## 2020-07-14 PROCEDURE — 74011250637 HC RX REV CODE- 250/637: Performed by: INTERNAL MEDICINE

## 2020-07-14 PROCEDURE — 65660000000 HC RM CCU STEPDOWN

## 2020-07-14 PROCEDURE — 74011000258 HC RX REV CODE- 258: Performed by: INTERNAL MEDICINE

## 2020-07-14 PROCEDURE — 74011000258 HC RX REV CODE- 258: Performed by: PSYCHIATRY & NEUROLOGY

## 2020-07-14 RX ORDER — ATORVASTATIN CALCIUM 40 MG/1
40 TABLET, FILM COATED ORAL
Status: DISCONTINUED | OUTPATIENT
Start: 2020-07-14 | End: 2020-07-17

## 2020-07-14 RX ORDER — LISINOPRIL 5 MG/1
10 TABLET ORAL DAILY
Status: DISCONTINUED | OUTPATIENT
Start: 2020-07-14 | End: 2020-07-21 | Stop reason: HOSPADM

## 2020-07-14 RX ORDER — POLYETHYLENE GLYCOL 3350 17 G/17G
17 POWDER, FOR SOLUTION ORAL DAILY
Status: DISCONTINUED | OUTPATIENT
Start: 2020-07-14 | End: 2020-07-21 | Stop reason: HOSPADM

## 2020-07-14 RX ORDER — QUETIAPINE FUMARATE 25 MG/1
6.25 TABLET, FILM COATED ORAL
Status: DISCONTINUED | OUTPATIENT
Start: 2020-07-14 | End: 2020-07-21 | Stop reason: HOSPADM

## 2020-07-14 RX ORDER — PROPRANOLOL HYDROCHLORIDE 10 MG/1
10 TABLET ORAL DAILY
Status: DISCONTINUED | OUTPATIENT
Start: 2020-07-14 | End: 2020-07-15

## 2020-07-14 RX ORDER — IPRATROPIUM BROMIDE AND ALBUTEROL SULFATE 2.5; .5 MG/3ML; MG/3ML
3 SOLUTION RESPIRATORY (INHALATION)
Status: DISCONTINUED | OUTPATIENT
Start: 2020-07-14 | End: 2020-07-21 | Stop reason: HOSPADM

## 2020-07-14 RX ADMIN — SODIUM CHLORIDE, SODIUM LACTATE, POTASSIUM CHLORIDE, AND CALCIUM CHLORIDE 75 ML/HR: 600; 310; 30; 20 INJECTION, SOLUTION INTRAVENOUS at 09:31

## 2020-07-14 RX ADMIN — HEPARIN SODIUM 5000 UNITS: 5000 INJECTION INTRAVENOUS; SUBCUTANEOUS at 05:16

## 2020-07-14 RX ADMIN — FOSPHENYTOIN 100 MG PE: 50 INJECTION INTRAMUSCULAR; INTRAVENOUS at 22:18

## 2020-07-14 RX ADMIN — CARBIDOPA AND LEVODOPA 1 TABLET: 25; 100 TABLET ORAL at 08:09

## 2020-07-14 RX ADMIN — Medication 10 ML: at 13:42

## 2020-07-14 RX ADMIN — LEVETIRACETAM 1500 MG: 500 INJECTION, SOLUTION INTRAVENOUS at 08:08

## 2020-07-14 RX ADMIN — LEVETIRACETAM 1500 MG: 500 INJECTION, SOLUTION INTRAVENOUS at 21:20

## 2020-07-14 RX ADMIN — CARBIDOPA AND LEVODOPA 1 TABLET: 25; 100 TABLET ORAL at 21:19

## 2020-07-14 RX ADMIN — ATORVASTATIN CALCIUM 40 MG: 40 TABLET, FILM COATED ORAL at 21:20

## 2020-07-14 RX ADMIN — INSULIN LISPRO 6 UNITS: 100 INJECTION, SOLUTION INTRAVENOUS; SUBCUTANEOUS at 16:38

## 2020-07-14 RX ADMIN — AMPICILLIN SODIUM AND SULBACTAM SODIUM 3 G: 2; 1 INJECTION, POWDER, FOR SOLUTION INTRAMUSCULAR; INTRAVENOUS at 08:56

## 2020-07-14 RX ADMIN — Medication 5 ML: at 21:38

## 2020-07-14 RX ADMIN — FOSPHENYTOIN 100 MG PE: 50 INJECTION INTRAMUSCULAR; INTRAVENOUS at 05:16

## 2020-07-14 RX ADMIN — CARBIDOPA AND LEVODOPA 1 TABLET: 25; 100 TABLET ORAL at 16:38

## 2020-07-14 RX ADMIN — FOSPHENYTOIN 100 MG PE: 50 INJECTION INTRAMUSCULAR; INTRAVENOUS at 14:37

## 2020-07-14 RX ADMIN — LISINOPRIL 10 MG: 5 TABLET ORAL at 08:09

## 2020-07-14 RX ADMIN — AMPICILLIN SODIUM AND SULBACTAM SODIUM 3 G: 2; 1 INJECTION, POWDER, FOR SOLUTION INTRAMUSCULAR; INTRAVENOUS at 21:20

## 2020-07-14 RX ADMIN — HEPARIN SODIUM 5000 UNITS: 5000 INJECTION INTRAVENOUS; SUBCUTANEOUS at 21:20

## 2020-07-14 RX ADMIN — PROPRANOLOL HYDROCHLORIDE 10 MG: 10 TABLET ORAL at 08:09

## 2020-07-14 RX ADMIN — TAMSULOSIN HYDROCHLORIDE 0.4 MG: 0.4 CAPSULE ORAL at 08:08

## 2020-07-14 RX ADMIN — Medication 10 ML: at 05:16

## 2020-07-14 RX ADMIN — AMPICILLIN SODIUM AND SULBACTAM SODIUM 3 G: 2; 1 INJECTION, POWDER, FOR SOLUTION INTRAMUSCULAR; INTRAVENOUS at 13:42

## 2020-07-14 RX ADMIN — AMPICILLIN SODIUM AND SULBACTAM SODIUM 3 G: 2; 1 INJECTION, POWDER, FOR SOLUTION INTRAMUSCULAR; INTRAVENOUS at 02:13

## 2020-07-14 NOTE — PROGRESS NOTES
Hospitalist Progress Note    2020  Admit Date: 7/10/2020 12:55 AM   NAME: Dwain Garcia   :  1945   MRN:  [id number]   Attending: Courtney Connelly MD  PCP:  Valentino Christen, MD    SUBJECTIVE:   77year old CM who presented from [facility] with status epilepticus, resolved on . Hx of epilepsy, schizophrenia and Parkinson's disease. He was in the ICU initially intubated, extubated  and transferred to the medical floor . No further seizure activity noted. Neurology continues to follow along, remains on Keppra and Dilantin IV. NPO per speech concern for wet cough. Plans for MBS on . Was in restraints on  for agression. Tele-psych recommends  seroquel 6.25mg bid prn for severe agitation.  - resting, RUE swollen particularly the hand. No IV in that arm. Mild bruising of dorsum hand non tender. Bashir Gardner RN reports she spoke with pt's previous skilled nursing RN who states his current behavior sounds like his baseline. They had plans to send him to a [place] SNF and this information has been passed on to CM. Review of Systems negative with exception of pertinent positives noted above  PHYSICAL EXAM     Visit Vitals  /72 (BP 1 Location: Right arm, BP Patient Position: At rest)   Pulse 97   Temp 99.5 °F (37.5 °C)   Resp 18   Wt 71.3 kg (157 lb 3 oz)   SpO2 92%   BMI 24.62 kg/m²      Temp (24hrs), Av.1 °F (37.3 °C), Min:98.1 °F (36.7 °C), Max:99.8 °F (37.7 °C)    Oxygen Therapy  O2 Sat (%): 92 % (20)  Pulse via Oximetry: 75 beats per minute (20 0100)  O2 Device: Nasal cannula (20)  O2 Flow Rate (L/min): 3 l/min (20)  FIO2 (%): 30 % (20 0905)    Intake/Output Summary (Last 24 hours) at 2020 171  Last data filed at 2020 6455  Gross per 24 hour   Intake --   Output 1200 ml   Net -1200 ml      General: No acute distress  confused/paranoid/ not agitated today  Lungs:  CTA Bilaterally.    Heart:  Regular rate and rhythm,  No murmur, rub, or gallop  Abdomen: Soft, Non distended, Non tender, Positive bowel sounds  Extremities: No cyanosis, clubbing or edema  Neurologic:  Alert, oriented x 1, no focal deficits. ASSESSMENT      Active Hospital Problems    Diagnosis Date Noted    Dysphagia 07/12/2020    SIRS (systemic inflammatory response syndrome) ([facility] [address] 07/11/2020    DM2 (diabetes mellitus, type 2) ([facility] [address] 07/10/2020    [facility] 07/10/2020    Essential hypertension 07/10/2020    Status epilepticus [facility] 07/09/2020       A/P:    1. Status epilepticus resolved with seizure disorder:  Neurology following  Keppra and Dilantin IV  Telemetry  Seizure precautions.     2. Type II DM:  Check A1C  SSI     3. Parkinson's disease:  Continue sinemet. Neuro following     4. Essential HTN:  Inderal, lisinopril  monitor     5. Dysphagia:  ST following  NPO for now  LR 75 ml/h   MBS in AM.   Will need nutrition soon so hopefully able to safely handle some consistency on MBS     6. Bilateral infiltrates suspect Aspiration Pneumonia  Continue Unasyn D3  Sputum culture ordered  **of note, BC from 7/9 1/2 pos diptheroids likely contaminant. 7/12 blood cx's ngtd    7. Schizophrenia  With behavioral issues  CI's to multiple medications given hx of PD and seizures  Psych recommends seroquel 6.25mg bid prn severe agitation. 8. Report of COPD  Add Prn nebs    9.  RUE swelling  Check duplex study    DVT Prophylaxis: scds    Signed By: Chantal Irvin DO     July 14, 2020

## 2020-07-14 NOTE — PROGRESS NOTES
Problem: Mobility Impaired (Adult and Pediatric)  Goal: *Acute Goals and Plan of Care (Insert Text)  Outcome: Progressing Towards Goal  Note: Goals:  (1.)Mr. Lalo Piña will move from supine to sit and sit to supine , scoot up and down, and roll side to side with CONTACT GUARD ASSIST within 4 treatment day(s). (2.)Mr. Lalo Piña will transfer from bed to chair and chair to bed with CONTACT GUARD ASSIST using the least restrictive device within 4 treatment day(s). (3.)Mr. Lalo Piña will participate in BLE AROM strength exercises in supine and sitting to improve functional mobility within 4 treatment day(s). (4.)Mr. Lalo Piña will be assessed for gait as appropriate once standing and SPTs have been achieved. PHYSICAL THERAPY: Daily Note and AM 7/14/2020  INPATIENT: PT Visit Days : 2  [place] / [place] / Product Type: Medicare /       NAME/AGE/GENDER: Amado Bunn is a 77 y.o. male   PRIMARY DIAGNOSIS: Status epilepticus ([place]) [G40.901]  Status epilepticus ([place]) [G40.901] Status epilepticus ([place]) Status epilepticus ([place])       ICD-10: Treatment Diagnosis:    · Generalized Muscle Weakness (M62.81)  · Difficulty in walking, Not elsewhere classified (R26.2)  · Other abnormalities of gait and mobility (R26.89)   Precaution/Allergies:  Patient has no known allergies. ASSESSMENT:     Mr. Lalo Piña is a 77year old WM with an admitting diagnosis of Status epilepticus. His PMH includes Parkinson's and Hx of epilepsy and schizophrenia. Patient was supine upon contact and not very cooperative. Attempted to explain to patient the importance of physical therapy but patient had difficulty understanding and processing. Patient was slow to respond to questions and often responded with obscenities. Patient performs supine to sit with total assist x 2. Sits with good static sitting balance. Refuses to do anything else and would respond with obscenities to anything therapist said.  Patient returns to supine with min assist. Progress is limited by cognition and non compliance. Overall slow progress towards physical therapy goals. Patient's goals listed above are still appropriate. Will continue skilled PT to address remaining deficits. This section established at most recent assessment   PROBLEM LIST (Impairments causing functional limitations):  1. Decreased Strength  2. Decreased ADL/Functional Activities  3. Decreased Transfer Abilities  4. Decreased Ambulation Ability/Technique  5. Decreased Balance  6. Decreased Activity Tolerance  7. Increased Fatigue   INTERVENTIONS PLANNED: (Benefits and precautions of physical therapy have been discussed with the patient.)  1. Bed Mobility  2. Therapeutic Activites  3. Therapeutic Exercise/Strengthening  4. Transfer Training     TREATMENT PLAN: Frequency/Duration: 3 times a week for duration of hospital stay  Rehabilitation Potential For Stated Goals: [facility] (at time of discharge pending progress):    Placement: It is my opinion, based on this patient's performance to date, that Mr. Hollis Johnson may return to LTC at [facility]. Equipment:    None at this time              HISTORY:   History of Present Injury/Illness (Reason for Referral):  Patient is a assisted living resident with seizure history on 2 agent (keepra and valproic acid)/DM/HTN BIBA for seizure. DR. Castle felt possible ?post ictal. Initially was responsive to him but then had witnessed seizure by staff and given ativan. Not very alert with worsening oxygenation, and acidosis on ABG. He then intubated the patient. BP low and given Neosynephroine. Then another seizure noted and given ativan and loaded keepra at 60 mg/kg. Past Medical History/Comorbidities:   Mr. Hollis Johnson  has a past medical history of Diabetes ([facility] [address] and Hypertension. Mr. Hollis Johnson  has no past surgical history on file.   Social History/Living Environment:   [facility] [address] [facility] [facility]  One/Two Story Residence: One story  Living Alone: Yes  Support Systems: Home care staff  Patient Expects to be Discharged to[de-identified] Assisted living  Current DME Used/Available at Home: Wheelchair, Walker, rolling  Prior Level of Function/Work/Activity:       Patient reports he primarily uses a w/c and only assists with standing and transferring. Number of Personal Factors/Comorbidities that affect the Plan of Care: 1-2: MODERATE COMPLEXITY   EXAMINATION:   Most Recent Physical Functioning:   Gross Assessment:                  Posture:     Balance:  Sitting: Impaired  Sitting - Static: Good (unsupported) Bed Mobility:  Supine to Sit: Total assistance;Assist x2  Sit to Supine: Minimum assistance  Wheelchair Mobility:     Transfers:     Gait:   NT - patient primarily functions with use of a w/c at the assisted living         Body Structures Involved:  1. Joints  2. Muscles Body Functions Affected:  1. Sensory/Pain  2. Neuromusculoskeletal  3. Movement Related Activities and Participation Affected:  1. General Tasks and Demands  2. Mobility  3. Self Care   Number of elements that affect the Plan of Care: 3: MODERATE COMPLEXITY   CLINICAL PRESENTATION:   Presentation: Evolving clinical presentation with changing clinical characteristics: MODERATE COMPLEXITY   CLINICAL DECISION MAKIN [facility] Mobility Inpatient Short Form  How much difficulty does the patient currently have. .. Unable A Lot A Little None   1. Turning over in bed (including adjusting bedclothes, sheets and blankets)? [] 1   [] 2   [x] 3   [] 4   2. Sitting down on and standing up from a chair with arms ( e.g., wheelchair, bedside commode, etc.)   [] 1   [x] 2   [] 3   [] 4   3. Moving from lying on back to sitting on the side of the bed? [] 1   [x] 2   [] 3   [] 4   How much help from another person does the patient currently need. .. Total A Lot A Little None   4.   Moving to and from a bed to a chair (including a wheelchair)? [] 1   [x] 2   [] 3   [] 4   5. Need to walk in hospital room? [x] 1   [] 2   [] 3   [] 4   6. Climbing 3-5 steps with a railing? [x] 1   [] 2   [] 3   [] 4   © 2007, Trustees of [address] [place] [address], under license to [employer]. All rights reserved      Score:  Initial: 11 Most Recent: X (Date: -- )    Interpretation of Tool:  Represents activities that are increasingly more difficult (i.e. Bed mobility, Transfers, Gait). Medical Necessity:     · Patient is expected to demonstrate progress in   · functional technique  ·  to   · decrease assistance required with bed mobility and SPT. Short distance gait to be assessed  · .  Reason for Services/Other Comments:  · Patient continues to require skilled intervention due to   · medical complications  · . Use of outcome tool(s) and clinical judgement create a POC that gives a: Questionable prediction of patient's progress: MODERATE COMPLEXITY            TREATMENT:   (In addition to Assessment/Re-Assessment sessions the following treatments were rendered)   Pre-treatment Symptoms/Complaints:  See above  Pain: Initial:   Pain Intensity 1: 0  Post Session:  0/10     Therapeutic Activity: (    9 Minutes): Therapeutic activities including bed mobility training, static/dynamic sitting balance, posture training, command following/participation, scooting, and patient education to improve mobility, strength and balance. Required maximal verbal, tactile and manual cues   to promote static and dynamic balance in sitting and promote improved participation.          Braces/Orthotics/Lines/Etc:   · calixto catheter   Treatment/Session Assessment:    · Response to Treatment: See above  · Interdisciplinary Collaboration:   o Physical Therapy Assistant  o Registered Nurse  o Rehabilitation Attendant  · After treatment position/precautions:   o Supine in bed  o Bed alarm/tab alert on  o Bed/Chair-wheels locked  o Bed in low position  o Call light within reach  o RN notified   · Compliance with Program/Exercises: Will assess as treatment progresses  · Recommendations/Intent for next treatment session: \"Next visit will focus on advancements to more challenging activities and reduction in assistance provided\".   Total Treatment Duration:  PT Patient Time In/Time Out  Time In: 0829  Time Out: 515 [facility]

## 2020-07-14 NOTE — CDMP QUERY
Pt admitted with Seizures. Pt noted to have mild bibasilar lung atelectasis or infiltrates on the cxr on 7/12  . If possible, please document in the progress notes and discharge summary if you are evaluating and/or treating any of the following:    ·         Bacterial pneumonia   ·         Viral pneumonia  ·         Aspiration pneumonia  ·         Other, please specify  ·         Clinically unable to determine  ·         Unknown    The medical record reflects the following:    Risk Factors: 75yr, extubated on 7/11, hx Parkinson's disease    Clinical Indicators:coughing with puree/nectar, Dysphagia, WBC 18.6, RR 28,30, per documentation \"NPO per speech concern for wet cough\"    Treatment: unasyn IV, supplemental oxygen,speech eval, NPO, modified barium swallow ordered          Thanks,  Kamilah Branham RN  Compliant Documentation Management Program  [phone]    .

## 2020-07-14 NOTE — PROGRESS NOTES
Problem: Dysphagia (Adult)  Goal: *Acute Goals and Plan of Care (Insert Text)  Description: ST. Pt. Will tolerate PO trials with SLP with no overt s/sx of aspiration/penetration. MET 20  2. Pt. Will participate in modified barium swallow with 100% participation to fully evaluate swallow function if further indicated in plan of care. 3. Pt will tolerate puree diet and nectar thick liquids without overt s/sx airway compromise. Added 20. LTG: Pt. Will tolerate least restrictive diet without respiratory decline. Outcome: Progressing Towards Goal   SPEECH LANGUAGE PATHOLOGY: DYSPHAGIA- Daily Note 3    NAME/AGE/GENDER: Harish Calderon is a 77 y.o. male  DATE: 2020  PRIMARY DIAGNOSIS: Status epilepticus ([facility] [address] [G40.901]  Status epilepticus ([facility] [address] [G40.901]      ICD-10: Treatment Diagnosis: R13.12 Dysphagia, Oropharyngeal Phase    RECOMMENDATIONS   DIET:    PO:  Pureed   Liquids:  nectar by cup   NO STRAWS    MEDICATIONS: Crushed in puree     ASPIRATION PRECAUTIONS  · Slow rate of intake  · Small bites/sips  · Upright at 90 degrees during meal  · Oral care   · NO STRAWS     COMPENSATORY STRATEGIES/MODIFICATIONS  · Needs 1:1 assistance with meals     EDUCATION:  · Recommendations discussed with Nursing  · Patient     CONTINUATION OF SKILLED SERVICES/MEDICAL NECESSITY:   Patient is expected to demonstrate progress in  swallow strength, swallow timeliness, swallow function, diet tolerance, and swallow safety in order to  improve swallow safety, work toward diet advancement, and decrease aspiration risk.  Patient continues to require skilled intervention due to dysphagia. RECOMMENDATIONS for CONTINUED SPEECH THERAPY:   YES: Anticipate need for ongoing speech therapy during this hospitalization and at next level of care. ASSESSMENT   Patient with improved acceptance and tolerance of PO trials this date. Continues to present with mild-moderate oropharyngeal dysphagia symptoms.   Mild anterior spillage of nectar by cup and puree due to reduced labial seal and right sided facial droop. Oral holding with thin liquids; however, swallow prompt upon palpation with nectar and puree trials. Throat clear post swallow with nectar by straw. Cough post swallow x1 with puree; however, no further overt s/sx airway compromise with nectar by cup or puree. Chewable trials deferred due to mentation and concerns for reduced pharyngeal clearance. Recommend initiate puree diet/nectar thick liquids by cup. NO STRAWS. Medications crushed in puree. Will continue to follow for diet tolerance and PO trials for potential diet advancement. REHABILITATION POTENTIAL FOR STATED GOALS: Fair    PLAN    FREQUENCY/DURATION: Continue to follow patient 3 times a week for duration of hospital stay to address above goals. - Recommendations for next treatment session: Next treatment will address diet tolerance and PO trials for potential diet advancement. SUBJECTIVE   Upright in bed in wrist restraints. Patient appeared calm, but cussing at therapist intermittently throughout session. Tremor noted in hands and mandible. History of Present Injury/Illness: Mr. Pamela Macdonald  has a past medical history of Diabetes ([facility] [address] and Hypertension. Wm Daugherty He also  has no past surgical history on file. Problem List:  (Impairments causing functional limitations):  1. dysphagia    Previous Dysphagia: NONE REPORTED  Diet Prior to Evaluation: NPO    Orientation:   No response to orientation questions. \"You know my name. \"    Pain: Pain Scale 1: FLACC  Pain Intensity 1: 0    OBJECTIVE   Patient seen for po trials. Patient drowsy upon arrival. Did not respond to questions, but stating, Gene Arrow me some real food. \" RN notified of significant right sided facial droop. Patient presented with ice chip, thin by teaspoon/cup, nectar by teaspoon/cup/straw, and puree. Patient held ice chip in oral cavity for a few seconds then expelled bolus.  Immediate coughing after expulsion. No overt s/sx airway compromise with thin by teaspoon/cup; however, patient with moderate oral holding and appears to piecemeal swallow each trial. Prompt swallow upon palpation with nectar and puree trials. Throat clear post swallow x1 with thin by straw and immediate cough post swallow x1 with puree. No overt s/sx airway compromise with nectar by cup or further trials puree. Single swallow upon palpation with nectar and puree trials. INTERDISCIPLINARY COLLABORATION: Registered Nurse  PRECAUTIONS/ALLERGIES: Patient has no known allergies. Tool Used: Dysphagia Outcome and Severity Scale (DEANNE)    Score Comments   Normal Diet  [] 7 With no strategies or extra time needed   Functional Swallow  [] 6 May have mild oral or pharyngeal delay   Mild Dysphagia  [] 5 Which may require one diet consistency restricted    Mild-Moderate Dysphagia  [] 4 With 1-2 diet consistencies restricted   Moderate Dysphagia  [] 3 With 2 or more diet consistencies restricted   Moderate-Severe Dysphagia  [] 2 With partial PO strategies (trials with ST only)   Severe Dysphagia  [x] 1 With inability to tolerate any PO safely      Score:  Initial: 1 Most Recent: 3 (Date 07/14/20 )   Interpretation of Tool: The Dysphagia Outcome and Severity Scale (DEANNE) is a simple, easy-to-use, 7-point scale developed to systematically rate the functional severity of dysphagia based on objective assessment and make recommendations for diet level, independence level, and type of nutrition. Current Medications:   No current facility-administered medications on file prior to encounter. Current Outpatient Medications on File Prior to Encounter   Medication Sig Dispense Refill    alogliptin (NESINA) 6.25 mg tablet Take 12.5 mg by mouth.  amantadine HCl (SYMMETREL) 100 mg capsule Take 100 mg by mouth.  atorvastatin (LIPITOR) 40 mg tablet Take 20 mg by mouth.  LORazepam (ATIVAN) 0.5 mg tablet Take  by mouth.  tamsulosin (FLOMAX) 0.4 mg capsule Take 0.4 mg by mouth.  levETIRAcetam (KEPPRA) 750 mg tablet Take 1 Tab by mouth two (2) times a day. 60 Tab 0    magnesium oxide (MAG-OX) 400 mg tablet Take 1 Tab by mouth two (2) times a day. 60 Tab 0    albuterol (PROVENTIL HFA, VENTOLIN HFA, PROAIR HFA) 90 mcg/actuation inhaler Take 2 Puffs by inhalation every six (6) hours as needed for Wheezing. 1 Inhaler 0    risperiDONE (RISPERDAL M-TAB) 2 mg disintegrating tablet 1 tab every 8 hours as needed for agitation 20 Tab 0    metFORMIN (GLUCOPHAGE) 1,000 mg tablet Take 1,000 mg by mouth two (2) times a day.  glipiZIDE (GLUCOTROL) 10 mg tablet Take 10 mg by mouth two (2) times a day.  divalproex ER (DEPAKOTE ER) 250 mg ER tablet Take 250 mg by mouth two (2) times a day.  sAXagliptin (ONGLYZA) 5 mg tab tablet Take  by mouth daily.  diphenhydrAMINE (BENADRYL) 25 mg capsule Take 25 mg by mouth three (3) times daily.  polyethylene glycol (MIRALAX) 17 gram packet Take 17 g by mouth daily.  lisinopril (PRINIVIL, ZESTRIL) 10 mg tablet Take 10 mg by mouth daily. Take 1 half tab every day by mouth      propranolol (INDERAL) 10 mg tablet Take  by mouth daily.  aspirin 81 mg chewable tablet Take 81 mg by mouth three (3) times daily as needed for Pain (as needed for pain or fever).          After treatment position/precautions:  · Upright in bed   · RN notified    Total Treatment Duration:   Time In: 1409  Time Out: 7779 [facility] [address], [address] [facility]

## 2020-07-14 NOTE — PROGRESS NOTES
Problem: Patient Education: Go to Patient Education Activity  Goal: Patient/Family Education  Outcome: Resolved/Met  Note: Pt is near baseline and is Total A for all self care, occassionally can feed himself if Parkinson's tremors are not too bad. WC bound, A with all TFs. No further skilled OT indicated at this time. Jessica Okeefe, MS, OTR/L         OCCUPATIONAL THERAPY: Initial Assessment, Daily Note, Discharge, and PM   7/14/2020  INPATIENT: OT Visit Days: 1  [place] / [place] / Product Type: Medicare /      NAME/AGE/GENDER: Fly Alicea is a 77 y.o. male   PRIMARY DIAGNOSIS:  Status epilepticus ([address] [G40.901]  Status epilepticus ([address] [G40.901] Status epilepticus ([address] Status epilepticus ([address]        ICD-10: Treatment Diagnosis:    Generalized Muscle Weakness (M62.81)  Other lack of cordination (R27.8)  Difficulty in walking, Not elsewhere classified (R26.2)   Precautions/Allergies:    Falls, seizures, Patient has no known allergies. ASSESSMENT:     Mr. Dary Hudson is 76 YO R dominant resident of [address] [place] [address] who has h/o Parkinson's, Schizophrenia, dementia, and seizures. Pt alert, sitting up in bed with R wrist restraint in place because he has been pulling on his lines. Pt asking for help feeding. Pt oriented to name only and perseverating on eating. Has nectar thick liquids and pureed food. R facial droop and dysphagia. Pt supposed to be wearing oxygen but ghas refused to keep it in place per RN. PLOF per MARICARMEN, pt is Total A with self care though he could on occasion help feed himself depending on how his tremors are at the time. Needs A with All sit to stand and then pivot TFs to R [address], unable to self propel. Has RW but rarely uses. Has brother but group home staff does all care for pt. Pt Total A to feed today, tremors significant when given the chance to try and R wrist restraint removed.  Pt able to tolerated pureed food with dependent feeding by OT, but nectar thick liquids seemed to cause choking. Pt enjoyed food and said as much, but has flat affect. RN alerted and reported pt is now NPO for MBS tomorrow. Dep cleaning off face and food debris. Not really following commands unless he wants to. B UEs are limited by tremors but appear WFLs for AROM, decreased strength, impaired coordination. Pt left with R wrist restraint in place, door propped open and RN aware. Pt is very near baseline at total A and does not need skilled OT at this time. Will DC our services. This section established at most recent assessment   PROBLEM LIST (Impairments causing functional limitations):  Decreased Strength  Decreased ADL/Functional Activities  Decreased Transfer Abilities  Decreased Ambulation Ability/Technique  Decreased Balance  Decreased Activity Tolerance  Decreased Pacing Skills  Decreased Work Simplification/Energy Conservation Techniques  Increased Fatigue  Increased Shortness of Breath  Decreased Flexibility/Joint Mobility  Decreased Knowledge of Precautions  Decreased Skin Integrity/Hygeine  Decreased Cognition   INTERVENTIONS PLANNED: (Benefits and precautions of occupational therapy have been discussed with the patient.)  Eval and DC     TREATMENT PLAN: Frequency/Duration:Eval and DC, near baseline  Rehabilitation Potential For Stated Goals: Poor     REHAB RECOMMENDATIONS (at time of discharge pending progress):    Placement: It is my opinion, based on this patient's performance to date, that Mr. Pamela Macdonald may benefit from being discharged with NO further skilled therapy due to a proven ability to function at baseline and total A for all self care and mobility . Equipment:   None at this time              OCCUPATIONAL PROFILE AND HISTORY:   History of Present Injury/Illness (Reason for Referral):  Called by Albert Schwartz Attending Dr. Bone Both patient with status epileptics.  Patient is a assisted living resident with seizure history on 2 agent (keepra and valproic acid)/DM/HTN BIBA for seizure. DR. Castle felt possible ?post ictal. Initially was responsive to him but then had witnessed seizure by staff and given ativan. Not very alert with worsening oxygenation, and acidosis on ABG. He then intubated the patient. BP low and given Neosynephroine. Then another seizure noted and given ativan and loaded keepra at 60 mg/kg. CT head done and noted:     *BRAIN:      -  There are no early signs of territorial or lacunar infarction by CT.     -  Symmetric supratentorial atrophy. -  For patient's age, the scattered areas of white matter hypodensities may  represent a chronic small vessel white matter ischemia. However this is  Nonspecific. Past Medical History/Comorbidities:   Mr. Valrie Kawasaki  has a past medical history of Diabetes ([facility] and Hypertension. Mr. Valrie Kawasaki  has no past surgical history on file. Social History/Living Environment:   Home Environment: [address] [place] [address] Road Name: [address] [place] [address] Steps to Enter: 0  One/Two Story Residence: One story  Living Alone: Yes  Support Systems: Assisted living, Home care staff  Patient Expects to be Discharged to[de-identified] Assisted living  Current DME Used/Available at Home: Wheelchair, Walker, rolling  Tub or Shower Type: Tub/Shower combination  Prior Level of Function/Work/Activity:  Dependent with all self care tasks and mobility  Dominant Side:         RIGHT   Number of Personal Factors/Comorbidities that affect the Plan of Care: Extensive review of physical, cognitive, and psychosocial performance (3+):  HIGH COMPLEXITY   ASSESSMENT OF OCCUPATIONAL PERFORMANCE[de-identified]   Activities of Daily Living:   Basic ADLs (From Assessment) Complex ADLs (From Assessment)   Feeding: Total assistance, Maximum assistance(poor coordination)  Oral Facial Hygiene/Grooming: Total assistance  Bathing: Total assistance  Upper Body Dressing: Total assistance  Lower Body Dressing: Total assistance  Toileting:  Total assistance Instrumental ADL  Meal Preparation: Total assistance  Homemaking: Total assistance  Medication Management: Total assistance  Financial Management: Total assistance   Grooming/Bathing/Dressing Activities of Daily Living     Cognitive Retraining  Safety/Judgement: Fall prevention;Decreased awareness of need for assistance;Decreased awareness of need for safety;Decreased insight into deficits; Decreased awareness of environment                       Bed/Mat Mobility  Rolling: Moderate assistance  Supine to Sit: Total assistance  Sit to Supine: Maximum assistance  Scooting: Maximum assistance     Most Recent Physical Functioning:   Gross Assessment:  AROM: Generally decreased, functional  Strength: Generally decreased, functional  Coordination: Generally decreased, functional  Tone: Abnormal(stiff with movements and B UE tremors)  Sensation: Intact(to light touch)               Posture:  Posture (WDL): Exceptions to WDL  Posture Assessment: Forward head, Rounded shoulders, Kyphosis  Balance:  Sitting: Impaired  Sitting - Static: Good (unsupported)  Sitting - Dynamic: Fair (occasional) Bed Mobility:  Rolling:  Moderate assistance  Supine to Sit: Total assistance  Sit to Supine: Maximum assistance  Scooting: Maximum assistance  Wheelchair Mobility:     Transfers:               Patient Vitals for the past 6 hrs:   BP BP Patient Position SpO2 Pulse   07/14/20 1221 158/70 At rest 93 % 76       Mental Status  Neurologic State: Alert  Orientation Level: Oriented to person, Disoriented to place, Disoriented to situation, Disoriented to time(asking for help eating)  Cognition: Decreased attention/concentration, Decreased command following  Perception: Appears intact  Perseveration: Perseverates during conversation  Safety/Judgement: Fall prevention, Decreased awareness of need for assistance, Decreased awareness of need for safety, Decreased insight into deficits, Decreased awareness of environment                          Physical Skills Involved:  Range of Motion  Balance  Strength  Activity Tolerance  Sensation  Fine Motor Control  Gross Motor Control  Edema  Skin Integrity Cognitive Skills Affected (resulting in the inability to perform in a timely and safe manner):  Perception  Executive Function  Immediate Memory  Short Term Recall  Sustained Attention  Divided Attention  Comprehension  Expression Psychosocial Skills Affected:  Habits/Routines  Environmental Adaptation  Social Interaction  Self-Awareness   Number of elements that affect the Plan of Care: 5+:  HIGH COMPLEXITY   CLINICAL DECISION MAKING:   [facility] AM-PAC 6 Clicks   Daily Activity Inpatient Short Form  How much help from another person does the patient currently need. .. Total A Lot A Little None   1. Putting on and taking off regular lower body clothing? [x] 1   [] 2   [] 3   [] 4   2. Bathing (including washing, rinsing, drying)? [x] 1   [] 2   [] 3   [] 4   3. Toileting, which includes using toilet, bedpan or urinal?   [x] 1   [] 2   [] 3   [] 4   4. Putting on and taking off regular upper body clothing? [x] 1   [] 2   [] 3   [] 4   5. Taking care of personal grooming such as brushing teeth? [x] 1   [] 2   [] 3   [] 4   6. Eating meals? [x] 1   [] 2   [] 3   [] 4   © 2007, Trustees of [facility], under license to [employer]. All rights reserved      Score:  Initial: 6, completed 7/14/2020 Most Recent: X (Date: -- )    Interpretation of Tool:  Represents activities that are increasingly more difficult (i.e. Bed mobility, Transfers, Gait). Medical Necessity:     Patient demonstrates   poor   rehab potential due to higher previous functional level. Reason for Services/Other Comments:  DC skilled OT due to near Total A baseline.    Use of outcome tool(s) and clinical judgement create a POC that gives a: MODERATE COMPLEXITY         TREATMENT:   (In addition to Assessment/Re-Assessment sessions the following treatments were rendered)     Pre-treatment Symptoms/Complaints: \"Feed me I said\"  Pain: Initial:   Pain Intensity 1: 0  Post Session:  no complaint of pain     Self Care: (30 mins): Procedure(s) (per grid) utilized to improve and/or restore self-care/home management as related to bathing, grooming, and self feeding. Required maximal to total Assist visual, verbal, tactile, physical assist, and   cueing to facilitate activities of daily living skills. Evaluation completed    Braces/Orthotics/Lines/Etc:   IV  calixto catheter  O2 Device: Nasal cannula  Treatment/Session Assessment:    Response to Treatment:  tolerated being fed pureed food, less so with nectar thick liquids, total A at baseline  Interdisciplinary Collaboration:   Occupational Therapist  Registered Nurse  After treatment position/precautions:   Bed alarm/tab alert on  Bed/Chair-wheels locked  Bed in low position  Call light within reach  RN notified  Nurse at bedside  Restraints  Side rails x 3  Tray table out of reach since pt confused and pulling lines    Compliance with Program/Exercises: Probably noncompliant though I cannot elicit that specific history.   Recommendations/Intent for next treatment session:  Eval and DC, near baseline, no skilled OT indicated  Total Treatment Duration:  30 mins  OT Patient Time In/Time Out  Time In: 1330  Time Out: [facility], OT   Justin Segura, MS, OTR/L

## 2020-07-14 NOTE — PROGRESS NOTES
Date of Outreach Update:  Kelsey Gan was seen and assessed. MEWS Score: 1 (07/13/20 1925)    Vitals:    07/13/20 0456 07/13/20 0800 07/13/20 1925 07/14/20 0037   BP: 151/59 172/72 166/67 165/44   Pulse: 88 88 71 83   Resp: 18 18 17 18   Temp: 98.4 °F (36.9 °C) 98.8 °F (37.1 °C) 98.2 °F (36.8 °C) 98.1 °F (36.7 °C)   SpO2: 94% 93% 94% 95%   Weight:              Previous Outreach assessment has been reviewed. There have been no significant clinical changes since the completion of the last dated Outreach assessment. Will discharge from outreach program per policy.     Signed By:   Ben Esquivel RN    July 14, 2020 1:24 AM

## 2020-07-14 NOTE — PROGRESS NOTES
Problem: Falls - Risk of  Goal: *Absence of Falls  Description: Document Cassie Fouke Fall Risk and appropriate interventions in the flowsheet. Outcome: Progressing Towards Goal  Note: Fall Risk Interventions:       Mentation Interventions: Adequate sleep, hydration, pain control, Bed/chair exit alarm, Door open when patient unattended, Increase mobility, More frequent rounding, Reorient patient    Medication Interventions: Bed/chair exit alarm, Patient to call before getting OOB, Teach patient to arise slowly    Elimination Interventions: Bed/chair exit alarm, Call light in reach, Patient to call for help with toileting needs, Stay With Me (per policy), Toilet paper/wipes in reach, Toileting schedule/hourly rounds              Problem: Patient Education: Go to Patient Education Activity  Goal: Patient/Family Education  Outcome: Progressing Towards Goal     Problem: Pressure Injury - Risk of  Goal: *Prevention of pressure injury  Description: Document Jeffery Scale and appropriate interventions in the flowsheet.   Outcome: Progressing Towards Goal  Note: Pressure Injury Interventions:  Sensory Interventions: Assess need for specialty bed, Avoid rigorous massage over bony prominences, Check visual cues for pain, Discuss PT/OT consult with provider, Float heels, Keep linens dry and wrinkle-free, Maintain/enhance activity level, Minimize linen layers, Pressure redistribution bed/mattress (bed type)    Moisture Interventions: Absorbent underpads, Limit adult briefs, Minimize layers    Activity Interventions: Increase time out of bed, Pressure redistribution bed/mattress(bed type), PT/OT evaluation    Mobility Interventions: HOB 30 degrees or less, Pressure redistribution bed/mattress (bed type), PT/OT evaluation    Nutrition Interventions: Document food/fluid/supplement intake    Friction and Shear Interventions: Apply protective barrier, creams and emollients, Foam dressings/transparent film/skin sealants, HOB 30 degrees or less, Lift sheet, Minimize layers                Problem: Patient Education: Go to Patient Education Activity  Goal: Patient/Family Education  Outcome: Progressing Towards Goal     Problem: Delirium Treatment  Goal: *Level of consciousness restored to baseline  Outcome: Progressing Towards Goal  Goal: *Level of environmental perceptions restored to baseline  Outcome: Progressing Towards Goal  Goal: *Sensory perception restored to baseline  Outcome: Progressing Towards Goal  Goal: *Emotional stability restored to baseline  Outcome: Progressing Towards Goal  Goal: *Functional assessment restored to baseline  Outcome: Progressing Towards Goal  Goal: *Absence of falls  Outcome: Progressing Towards Goal  Goal: *Will remain free of delirium, CAM Score negative  Outcome: Progressing Towards Goal  Goal: *Cognitive status will be restored to baseline  Outcome: Progressing Towards Goal  Goal: Interventions  Outcome: Progressing Towards Goal     Problem: Patient Education: Go to Patient Education Activity  Goal: Patient/Family Education  Outcome: Progressing Towards Goal     Problem: Patient Education: Go to Patient Education Activity  Goal: Patient/Family Education  Outcome: Progressing Towards Goal     Problem: Nutrition Deficit  Goal: *Optimize nutritional status  Outcome: Progressing Towards Goal     Problem: Seizure Disorder (Adult)  Goal: *STG: Remains free of seizure activity  Outcome: Progressing Towards Goal  Goal: *STG: Maintains lab values within therapeutic range  Outcome: Progressing Towards Goal  Goal: *STG/LTG: Complies with medication therapy  Outcome: Progressing Towards Goal  Goal: *STG: Remains free of injury during seizure activity  Outcome: Progressing Towards Goal  Goal: *STG: Remains safe in hospital  Outcome: Progressing Towards Goal  Goal: Interventions  Outcome: Progressing Towards Goal     Problem: Patient Education: Go to Patient Education Activity  Goal: Patient/Family Education  Outcome: Progressing Towards Goal     Problem: Patient Education: Go to Patient Education Activity  Goal: Patient/Family Education  Outcome: Progressing Towards Goal     Problem: Diabetes Self-Management  Goal: *Disease process and treatment process  Description: Define diabetes and identify own type of diabetes; list 3 options for treating diabetes. Outcome: Progressing Towards Goal  Goal: *Incorporating nutritional management into lifestyle  Description: Describe effect of type, amount and timing of food on blood glucose; list 3 methods for planning meals. Outcome: Progressing Towards Goal  Goal: *Incorporating physical activity into lifestyle  Description: State effect of exercise on blood glucose levels. Outcome: Progressing Towards Goal  Goal: *Developing strategies to promote health/change behavior  Description: Define the ABC's of diabetes; identify appropriate screenings, schedule and personal plan for screenings. Outcome: Progressing Towards Goal  Goal: *Using medications safely  Description: State effect of diabetes medications on diabetes; name diabetes medication taking, action and side effects. Outcome: Progressing Towards Goal  Goal: *Monitoring blood glucose, interpreting and using results  Description: Identify recommended blood glucose targets  and personal targets. Outcome: Progressing Towards Goal  Goal: *Prevention, detection, treatment of acute complications  Description: List symptoms of hyper- and hypoglycemia; describe how to treat low blood sugar and actions for lowering  high blood glucose level. Outcome: Progressing Towards Goal  Goal: *Prevention, detection and treatment of chronic complications  Description: Define the natural course of diabetes and describe the relationship of blood glucose levels to long term complications of diabetes.   Outcome: Progressing Towards Goal  Goal: *Developing strategies to address psychosocial issues  Description: Describe feelings about living with diabetes; identify support needed and support network  Outcome: Progressing Towards Goal  Goal: *Insulin pump training  Outcome: Progressing Towards Goal  Goal: *Sick day guidelines  Outcome: Progressing Towards Goal  Goal: *Patient Specific Goal (EDIT GOAL, INSERT TEXT)  Outcome: Progressing Towards Goal     Problem: Patient Education: Go to Patient Education Activity  Goal: Patient/Family Education  Outcome: Progressing Towards Goal     Problem: Non-Violent Restraints  Goal: *Removal from restraints as soon as assessed to be safe  Outcome: Progressing Towards Goal  Goal: *No harm/injury to patient while restraints in use  Outcome: Progressing Towards Goal  Goal: *Patient's dignity will be maintained  Outcome: Progressing Towards Goal  Goal: *Patient Specific Goal (EDIT GOAL, INSERT TEXT)  Outcome: Progressing Towards Goal  Goal: Non-violent Restaints:Standard Interventions  Outcome: Progressing Towards Goal  Goal: Non-violent Restraints:Patient Interventions  Outcome: Progressing Towards Goal  Goal: Patient/Family Education  Outcome: Progressing Towards Goal

## 2020-07-14 NOTE — PROGRESS NOTES
Critical Care Outreach Nurse Progress Report:    Subjective: In to assess pt secondary to transfer from ICU. MEWS Score: 1 (07/13/20 0800)    Vitals:    07/13/20 0023 07/13/20 0456 07/13/20 0800 07/13/20 1925   BP: 160/67 151/59 172/72 166/67   Pulse: 86 88 88 71   Resp: 18 18 18 17   Temp: 98 °F (36.7 °C) 98.4 °F (36.9 °C) 98.8 °F (37.1 °C) 98.2 °F (36.8 °C)   SpO2: 97% 94% 93% 94%   Weight:              Objective: Pt asleep in bed. Assessment: Pt asleep but awakens easily to voice. Only oriented to person and place. Pt appears calm at this time and remains in bilateral wrist restraints. O2 sat 95% on RA. Pt denies any pain at this time. Plan: Will continue to follow per outreach program protocol. Poly Villalta RN.

## 2020-07-14 NOTE — PROGRESS NOTES
Assisted patient with eating Puree diet with nectar thick liquids at lunch time. Patient after eating a few bites of puree food started coughing. Given nectar thick liquids and patient coughed after a 3 sips of liquid. This RN spoke with Speech Therapy and there is a concern for possible residue and food sticking in throat. Patient will be made NPO except medications crushed in applesauce until MBS can be done tomorrow with Speech.

## 2020-07-14 NOTE — PROGRESS NOTES
Nutrition Assessment for:   Follow up  BPA via malnutrition screening tool (unsure/unsure)  Assessment: Hx of DM, parkinson's. Presented with seizure. Intubated in ED at [facility] 7/9 and transferred to [facility]. Pt extubated 7/11. He remains NPO s/p SLP evaluation. OGT discontinued with extubation, no NG access at RD visit. Diet trial today per SLP, now NPO with plan for MBS tomorrow secondary difficulties with oral trials today. RN reports at baseline pt has to be fed at his assisted living facility. He is angry today secondary to not being allowed to eat. Palliative Care assessed and signed off: Pt is DNR. DIET NPO Except Meds    Inadequate to meet needs. Anthropometrics:  Height 5'7\", Weight Source: Bed, Weight: 71.3 kg (157 lb 3 oz), Body mass index is 24.62 kg/m². Bekah Barba BMI class of normal weight. WT / BMI 7/10/2020 7/9/2020 9/11/2019 9/7/2019   WEIGHT 157 lb 3 oz 154 lb 15.7 oz 155 lb 155 lb   Per weights listed in EMR, pt's weight has been relatively stable over the past 10 months. Last 3 Recorded Weights in this Encounter    07/10/20 0200   Weight: 71.3 kg (157 lb 3 oz)     Macronutrient needs: (revised 7/12 s/p extubation)  · EER:  1957-5550 kcal /day (20-25 kcal/kg bedscale BW)(71.3 kg)  · EPR:  73-80 grams protein/day (0.8-1 grams/kg bedscale BW)(71.3 kg)    Nutrition Diagnosis:  Inadequate oral intake related to swallowing difficulty as evidenced by NPO or clear liquid status due to medical condition. Nutrition Intervention:  Meals and snacks: Diet per SLP, if unable to advance diet s/p MBS recommend place NGFT and consult RD for TF management. Coordination of nutrition care by a Nutrition Professional: Discussed with Mariia Rausch RN. Discharge Nutrition Plan: Too soon to determine.     Sara Cox, [address]

## 2020-07-14 NOTE — PROGRESS NOTES
Problem: Falls - Risk of  Goal: *Absence of Falls  Description: Document Praveen Murray Fall Risk and appropriate interventions in the flowsheet. Outcome: Progressing Towards Goal  Note: Fall Risk Interventions:       Mentation Interventions: Bed/chair exit alarm, Door open when patient unattended, Increase mobility, More frequent rounding, Reorient patient, Room close to nurse's station    Medication Interventions: Bed/chair exit alarm, Patient to call before getting OOB, Teach patient to arise slowly    Elimination Interventions: Bed/chair exit alarm, Call light in reach, Patient to call for help with toileting needs, Stay With Me (per policy), Toileting schedule/hourly rounds, Toilet paper/wipes in reach              Problem: Patient Education: Go to Patient Education Activity  Goal: Patient/Family Education  Outcome: Progressing Towards Goal     Problem: Pressure Injury - Risk of  Goal: *Prevention of pressure injury  Description: Document Jeffery Scale and appropriate interventions in the flowsheet.   Outcome: Progressing Towards Goal  Note: Pressure Injury Interventions:  Sensory Interventions: Assess changes in LOC, Avoid rigorous massage over bony prominences    Moisture Interventions: Absorbent underpads, Check for incontinence Q2 hours and as needed    Activity Interventions: Increase time out of bed, Pressure redistribution bed/mattress(bed type), PT/OT evaluation    Mobility Interventions: HOB 30 degrees or less, Pressure redistribution bed/mattress (bed type), PT/OT evaluation    Nutrition Interventions: Document food/fluid/supplement intake, Offer support with meals,snacks and hydration    Friction and Shear Interventions: HOB 30 degrees or less                Problem: Patient Education: Go to Patient Education Activity  Goal: Patient/Family Education  Outcome: Progressing Towards Goal     Problem: Delirium Treatment  Goal: *Level of consciousness restored to baseline  Outcome: Progressing Towards Goal  Goal: *Level of environmental perceptions restored to baseline  Outcome: Progressing Towards Goal  Goal: *Sensory perception restored to baseline  Outcome: Progressing Towards Goal  Goal: *Emotional stability restored to baseline  Outcome: Progressing Towards Goal  Goal: *Functional assessment restored to baseline  Outcome: Progressing Towards Goal  Goal: *Absence of falls  Outcome: Progressing Towards Goal  Goal: *Will remain free of delirium, CAM Score negative  Outcome: Progressing Towards Goal  Goal: *Cognitive status will be restored to baseline  Outcome: Progressing Towards Goal  Goal: Interventions  Outcome: Progressing Towards Goal     Problem: Patient Education: Go to Patient Education Activity  Goal: Patient/Family Education  Outcome: Progressing Towards Goal     Problem: Patient Education: Go to Patient Education Activity  Goal: Patient/Family Education  Outcome: Progressing Towards Goal     Problem: Nutrition Deficit  Goal: *Optimize nutritional status  Outcome: Progressing Towards Goal     Problem: Seizure Disorder (Adult)  Goal: *STG: Remains free of seizure activity  Outcome: Progressing Towards Goal  Goal: *STG: Maintains lab values within therapeutic range  Outcome: Progressing Towards Goal  Goal: *STG/LTG: Complies with medication therapy  Outcome: Progressing Towards Goal  Goal: *STG: Remains free of injury during seizure activity  Outcome: Progressing Towards Goal  Goal: *STG: Remains safe in hospital  Outcome: Progressing Towards Goal  Goal: Interventions  Outcome: Progressing Towards Goal     Problem: Patient Education: Go to Patient Education Activity  Goal: Patient/Family Education  Outcome: Progressing Towards Goal     Problem: Patient Education: Go to Patient Education Activity  Goal: Patient/Family Education  Outcome: Progressing Towards Goal     Problem: Diabetes Self-Management  Goal: *Disease process and treatment process  Description: Define diabetes and identify own type of diabetes; list 3 options for treating diabetes. Outcome: Progressing Towards Goal  Goal: *Incorporating nutritional management into lifestyle  Description: Describe effect of type, amount and timing of food on blood glucose; list 3 methods for planning meals. Outcome: Progressing Towards Goal  Goal: *Incorporating physical activity into lifestyle  Description: State effect of exercise on blood glucose levels. Outcome: Progressing Towards Goal  Goal: *Developing strategies to promote health/change behavior  Description: Define the ABC's of diabetes; identify appropriate screenings, schedule and personal plan for screenings. Outcome: Progressing Towards Goal  Goal: *Using medications safely  Description: State effect of diabetes medications on diabetes; name diabetes medication taking, action and side effects. Outcome: Progressing Towards Goal  Goal: *Monitoring blood glucose, interpreting and using results  Description: Identify recommended blood glucose targets  and personal targets. Outcome: Progressing Towards Goal  Goal: *Prevention, detection, treatment of acute complications  Description: List symptoms of hyper- and hypoglycemia; describe how to treat low blood sugar and actions for lowering  high blood glucose level. Outcome: Progressing Towards Goal  Goal: *Prevention, detection and treatment of chronic complications  Description: Define the natural course of diabetes and describe the relationship of blood glucose levels to long term complications of diabetes.   Outcome: Progressing Towards Goal  Goal: *Developing strategies to address psychosocial issues  Description: Describe feelings about living with diabetes; identify support needed and support network  Outcome: Progressing Towards Goal  Goal: *Insulin pump training  Outcome: Progressing Towards Goal  Goal: *Sick day guidelines  Outcome: Progressing Towards Goal  Goal: *Patient Specific Goal (EDIT GOAL, INSERT TEXT)  Outcome: Progressing Towards Goal     Problem: Patient Education: Go to Patient Education Activity  Goal: Patient/Family Education  Outcome: Progressing Towards Goal     Problem: Non-Violent Restraints  Goal: *Removal from restraints as soon as assessed to be safe  Outcome: Progressing Towards Goal  Goal: *No harm/injury to patient while restraints in use  Outcome: Progressing Towards Goal  Goal: *Patient's dignity will be maintained  Outcome: Progressing Towards Goal  Goal: *Patient Specific Goal (EDIT GOAL, INSERT TEXT)  Outcome: Progressing Towards Goal  Goal: Non-violent Restaints:Standard Interventions  Outcome: Progressing Towards Goal  Goal: Non-violent Restraints:Patient Interventions  Outcome: Progressing Towards Goal  Goal: Patient/Family Education  Outcome: Progressing Towards Goal

## 2020-07-14 NOTE — PROGRESS NOTES
SPEECH PATHOLOGY NOTE:    RN called and reported patient coughing with puree/nectar lunch meal. Recommend keep patient NPO for now with meds crushed in puree. Will attempt modified barium swallow study tomorrow to objectively assess oropharyngeal swallow function.       Rosie Molina MS, CCC-SLP

## 2020-07-15 ENCOUNTER — APPOINTMENT (OUTPATIENT)
Dept: GENERAL RADIOLOGY | Age: 75
DRG: 100 | End: 2020-07-15
Attending: INTERNAL MEDICINE
Payer: MEDICARE

## 2020-07-15 PROBLEM — I48.91 ATRIAL FIBRILLATION (HCC): Status: ACTIVE | Noted: 2020-07-15

## 2020-07-15 LAB
ANION GAP SERPL CALC-SCNC: 9 MMOL/L (ref 7–16)
ATRIAL RATE: 119 BPM
ATRIAL RATE: 73 BPM
ATRIAL RATE: 81 BPM
BASOPHILS # BLD: 0.1 K/UL (ref 0–0.2)
BASOPHILS NFR BLD: 1 % (ref 0–2)
BUN SERPL-MCNC: 13 MG/DL (ref 8–23)
CALCIUM SERPL-MCNC: 8.5 MG/DL (ref 8.3–10.4)
CALCULATED P AXIS, ECG09: 71 DEGREES
CALCULATED P AXIS, ECG09: 98 DEGREES
CALCULATED R AXIS, ECG10: 54 DEGREES
CALCULATED R AXIS, ECG10: 55 DEGREES
CALCULATED R AXIS, ECG10: 65 DEGREES
CALCULATED T AXIS, ECG11: -74 DEGREES
CALCULATED T AXIS, ECG11: -90 DEGREES
CALCULATED T AXIS, ECG11: 41 DEGREES
CHLORIDE SERPL-SCNC: 112 MMOL/L (ref 98–107)
CO2 SERPL-SCNC: 24 MMOL/L (ref 21–32)
CREAT SERPL-MCNC: 0.91 MG/DL (ref 0.8–1.5)
DIAGNOSIS, 93000: NORMAL
DIFFERENTIAL METHOD BLD: ABNORMAL
EOSINOPHIL # BLD: 0.2 K/UL (ref 0–0.8)
EOSINOPHIL NFR BLD: 2 % (ref 0.5–7.8)
ERYTHROCYTE [DISTWIDTH] IN BLOOD BY AUTOMATED COUNT: 13.5 % (ref 11.9–14.6)
GLUCOSE BLD STRIP.AUTO-MCNC: 131 MG/DL (ref 65–100)
GLUCOSE BLD STRIP.AUTO-MCNC: 133 MG/DL (ref 65–100)
GLUCOSE BLD STRIP.AUTO-MCNC: 138 MG/DL (ref 65–100)
GLUCOSE BLD STRIP.AUTO-MCNC: 154 MG/DL (ref 65–100)
GLUCOSE SERPL-MCNC: 135 MG/DL (ref 65–100)
HCT VFR BLD AUTO: 29.7 % (ref 41.1–50.3)
HGB BLD-MCNC: 10 G/DL (ref 13.6–17.2)
IMM GRANULOCYTES # BLD AUTO: 0 K/UL (ref 0–0.5)
IMM GRANULOCYTES NFR BLD AUTO: 1 % (ref 0–5)
LYMPHOCYTES # BLD: 2.1 K/UL (ref 0.5–4.6)
LYMPHOCYTES NFR BLD: 26 % (ref 13–44)
MAGNESIUM SERPL-MCNC: 1.5 MG/DL (ref 1.8–2.4)
MCH RBC QN AUTO: 32.3 PG (ref 26.1–32.9)
MCHC RBC AUTO-ENTMCNC: 33.7 G/DL (ref 31.4–35)
MCV RBC AUTO: 95.8 FL (ref 79.6–97.8)
MONOCYTES # BLD: 0.9 K/UL (ref 0.1–1.3)
MONOCYTES NFR BLD: 11 % (ref 4–12)
NEUTS SEG # BLD: 4.6 K/UL (ref 1.7–8.2)
NEUTS SEG NFR BLD: 59 % (ref 43–78)
NRBC # BLD: 0 K/UL (ref 0–0.2)
P-R INTERVAL, ECG05: 162 MS
PLATELET # BLD AUTO: 194 K/UL (ref 150–450)
PMV BLD AUTO: 10.5 FL (ref 9.4–12.3)
POTASSIUM SERPL-SCNC: 3.5 MMOL/L (ref 3.5–5.1)
Q-T INTERVAL, ECG07: 388 MS
Q-T INTERVAL, ECG07: 450 MS
Q-T INTERVAL, ECG07: 476 MS
QRS DURATION, ECG06: 76 MS
QRS DURATION, ECG06: 78 MS
QRS DURATION, ECG06: 78 MS
QTC CALCULATION (BEZET), ECG08: 522 MS
QTC CALCULATION (BEZET), ECG08: 524 MS
QTC CALCULATION (BEZET), ECG08: 574 MS
RBC # BLD AUTO: 3.1 M/UL (ref 4.23–5.6)
SODIUM SERPL-SCNC: 145 MMOL/L (ref 136–145)
VENTRICULAR RATE, ECG03: 132 BPM
VENTRICULAR RATE, ECG03: 73 BPM
VENTRICULAR RATE, ECG03: 81 BPM
WBC # BLD AUTO: 7.8 K/UL (ref 4.3–11.1)

## 2020-07-15 PROCEDURE — 74011000258 HC RX REV CODE- 258: Performed by: INTERNAL MEDICINE

## 2020-07-15 PROCEDURE — 74011000255 HC RX REV CODE- 255: Performed by: INTERNAL MEDICINE

## 2020-07-15 PROCEDURE — 74011250636 HC RX REV CODE- 250/636: Performed by: INTERNAL MEDICINE

## 2020-07-15 PROCEDURE — 92611 MOTION FLUOROSCOPY/SWALLOW: CPT

## 2020-07-15 PROCEDURE — 36415 COLL VENOUS BLD VENIPUNCTURE: CPT

## 2020-07-15 PROCEDURE — 74011000258 HC RX REV CODE- 258: Performed by: PSYCHIATRY & NEUROLOGY

## 2020-07-15 PROCEDURE — 83735 ASSAY OF MAGNESIUM: CPT

## 2020-07-15 PROCEDURE — 74011250637 HC RX REV CODE- 250/637: Performed by: FAMILY MEDICINE

## 2020-07-15 PROCEDURE — 93005 ELECTROCARDIOGRAM TRACING: CPT | Performed by: INTERNAL MEDICINE

## 2020-07-15 PROCEDURE — 97530 THERAPEUTIC ACTIVITIES: CPT

## 2020-07-15 PROCEDURE — 82962 GLUCOSE BLOOD TEST: CPT

## 2020-07-15 PROCEDURE — 80048 BASIC METABOLIC PNL TOTAL CA: CPT

## 2020-07-15 PROCEDURE — 74230 X-RAY XM SWLNG FUNCJ C+: CPT

## 2020-07-15 PROCEDURE — 94760 N-INVAS EAR/PLS OXIMETRY 1: CPT

## 2020-07-15 PROCEDURE — 74011250636 HC RX REV CODE- 250/636: Performed by: PSYCHIATRY & NEUROLOGY

## 2020-07-15 PROCEDURE — 74011250637 HC RX REV CODE- 250/637: Performed by: PSYCHIATRY & NEUROLOGY

## 2020-07-15 PROCEDURE — 65660000000 HC RM CCU STEPDOWN

## 2020-07-15 PROCEDURE — 74011250637 HC RX REV CODE- 250/637: Performed by: INTERNAL MEDICINE

## 2020-07-15 PROCEDURE — 85025 COMPLETE CBC W/AUTO DIFF WBC: CPT

## 2020-07-15 RX ORDER — POTASSIUM CHLORIDE 14.9 MG/ML
20 INJECTION INTRAVENOUS
Status: COMPLETED | OUTPATIENT
Start: 2020-07-15 | End: 2020-07-15

## 2020-07-15 RX ORDER — MAGNESIUM SULFATE HEPTAHYDRATE 40 MG/ML
2 INJECTION, SOLUTION INTRAVENOUS ONCE
Status: COMPLETED | OUTPATIENT
Start: 2020-07-15 | End: 2020-07-15

## 2020-07-15 RX ORDER — CARVEDILOL 6.25 MG/1
6.25 TABLET ORAL 2 TIMES DAILY WITH MEALS
Status: DISCONTINUED | OUTPATIENT
Start: 2020-07-16 | End: 2020-07-21 | Stop reason: HOSPADM

## 2020-07-15 RX ADMIN — CARBIDOPA AND LEVODOPA 1 TABLET: 25; 100 TABLET ORAL at 21:47

## 2020-07-15 RX ADMIN — AMPICILLIN SODIUM AND SULBACTAM SODIUM 3 G: 2; 1 INJECTION, POWDER, FOR SOLUTION INTRAMUSCULAR; INTRAVENOUS at 02:48

## 2020-07-15 RX ADMIN — Medication 5 ML: at 06:09

## 2020-07-15 RX ADMIN — Medication 10 ML: at 12:06

## 2020-07-15 RX ADMIN — FOSPHENYTOIN 100 MG PE: 50 INJECTION INTRAMUSCULAR; INTRAVENOUS at 22:42

## 2020-07-15 RX ADMIN — SODIUM CHLORIDE 3 G: 900 INJECTION, SOLUTION INTRAVENOUS at 21:47

## 2020-07-15 RX ADMIN — MAGNESIUM SULFATE 2 G: 2 INJECTION INTRAVENOUS at 15:26

## 2020-07-15 RX ADMIN — BARIUM SULFATE 15 ML: 980 POWDER, FOR SUSPENSION ORAL at 13:26

## 2020-07-15 RX ADMIN — LEVETIRACETAM 1500 MG: 500 INJECTION, SOLUTION INTRAVENOUS at 21:01

## 2020-07-15 RX ADMIN — LEVETIRACETAM 1500 MG: 500 INJECTION, SOLUTION INTRAVENOUS at 08:30

## 2020-07-15 RX ADMIN — FOSPHENYTOIN 100 MG PE: 50 INJECTION INTRAMUSCULAR; INTRAVENOUS at 15:57

## 2020-07-15 RX ADMIN — HEPARIN SODIUM 5000 UNITS: 5000 INJECTION INTRAVENOUS; SUBCUTANEOUS at 06:04

## 2020-07-15 RX ADMIN — HEPARIN SODIUM 5000 UNITS: 5000 INJECTION INTRAVENOUS; SUBCUTANEOUS at 15:29

## 2020-07-15 RX ADMIN — BARIUM SULFATE 15 ML: 400 PASTE ORAL at 13:27

## 2020-07-15 RX ADMIN — AMPICILLIN SODIUM AND SULBACTAM SODIUM 3 G: 2; 1 INJECTION, POWDER, FOR SOLUTION INTRAMUSCULAR; INTRAVENOUS at 08:29

## 2020-07-15 RX ADMIN — LISINOPRIL 10 MG: 5 TABLET ORAL at 08:29

## 2020-07-15 RX ADMIN — POTASSIUM CHLORIDE 20 MEQ: 200 INJECTION, SOLUTION INTRAVENOUS at 17:16

## 2020-07-15 RX ADMIN — BARIUM SULFATE 30 ML: 400 SUSPENSION ORAL at 13:28

## 2020-07-15 RX ADMIN — ATORVASTATIN CALCIUM 40 MG: 40 TABLET, FILM COATED ORAL at 21:47

## 2020-07-15 RX ADMIN — CARBIDOPA AND LEVODOPA 1 TABLET: 25; 100 TABLET ORAL at 08:29

## 2020-07-15 RX ADMIN — FOSPHENYTOIN 100 MG PE: 50 INJECTION INTRAMUSCULAR; INTRAVENOUS at 06:06

## 2020-07-15 RX ADMIN — POTASSIUM CHLORIDE 20 MEQ: 200 INJECTION, SOLUTION INTRAVENOUS at 20:01

## 2020-07-15 RX ADMIN — CARBIDOPA AND LEVODOPA 1 TABLET: 25; 100 TABLET ORAL at 15:56

## 2020-07-15 RX ADMIN — BARIUM SULFATE 15 ML: 400 SUSPENSION ORAL at 13:27

## 2020-07-15 RX ADMIN — SODIUM CHLORIDE 3 G: 900 INJECTION, SOLUTION INTRAVENOUS at 14:19

## 2020-07-15 RX ADMIN — PROPRANOLOL HYDROCHLORIDE 10 MG: 10 TABLET ORAL at 08:29

## 2020-07-15 RX ADMIN — HEPARIN SODIUM 5000 UNITS: 5000 INJECTION INTRAVENOUS; SUBCUTANEOUS at 21:47

## 2020-07-15 RX ADMIN — Medication 10 ML: at 21:52

## 2020-07-15 RX ADMIN — TAMSULOSIN HYDROCHLORIDE 0.4 MG: 0.4 CAPSULE ORAL at 08:29

## 2020-07-15 NOTE — PROGRESS NOTES
Problem: Mobility Impaired (Adult and Pediatric)  Goal: *Acute Goals and Plan of Care (Insert Text)  Outcome: Progressing Towards Goal  Note: Goals:  (1.)Mr. Kevin Thurston will move from supine to sit and sit to supine , scoot up and down, and roll side to side with CONTACT GUARD ASSIST within 4 treatment day(s). (2.)Mr. Kevin Thurston will transfer from bed to chair and chair to bed with CONTACT GUARD ASSIST using the least restrictive device within 4 treatment day(s). (3.)Mr. Kevin Thurston will participate in BLE AROM strength exercises in supine and sitting to improve functional mobility within 4 treatment day(s). (4.)Mr. Kevin Thurston will be assessed for gait as appropriate once standing and SPTs have been achieved. PHYSICAL THERAPY: Daily Note and AM 7/15/2020  INPATIENT: PT Visit Days : 3  [place] / Product Type: Medicare /       NAME/AGE/GENDER: Smiley Bamberger is a 77 y.o. male   PRIMARY DIAGNOSIS: Status epilepticus ([address] [G40.901]  Status epilepticus ([address] [G40.901] Status epilepticus ([address] Status epilepticus ([address]       ICD-10: Treatment Diagnosis:    · Generalized Muscle Weakness (M62.81)  · Difficulty in walking, Not elsewhere classified (R26.2)  · Other abnormalities of gait and mobility (R26.89)   Precaution/Allergies:  Patient has no known allergies. ASSESSMENT:     Mr. Kevin Thurston is a 77year old WM with an admitting diagnosis of Status epilepticus. His PMH includes Parkinson's and Hx of epilepsy and schizophrenia. Patient was supine upon contact and agreeable to PT this morning although disgruntled wanting \"[facility]". Attempted to explain to patient the importance of physical therapy but patient had difficulty understanding and processing.  Patient was slow to respond to questions/commands and often did not respond to therapist. Patient performs supine to sit with total assist although patient did attempt to assist therapist but did not have the functional strength to perform on his own power. Sits with good static sitting balance. Attempted to have patient perform exercises or any other functional treatment but he stated, \"leave me alone\". Patient returns to supine with mod assist. Progress is limited by cognition and non compliance. Overall slow progress towards physical therapy goals. Patient's goals listed above are still appropriate. Will continue skilled PT to address remaining deficits. This section established at most recent assessment   PROBLEM LIST (Impairments causing functional limitations):  1. Decreased Strength  2. Decreased ADL/Functional Activities  3. Decreased Transfer Abilities  4. Decreased Ambulation Ability/Technique  5. Decreased Balance  6. Decreased Activity Tolerance  7. Increased Fatigue   INTERVENTIONS PLANNED: (Benefits and precautions of physical therapy have been discussed with the patient.)  1. Bed Mobility  2. Therapeutic Activites  3. Therapeutic Exercise/Strengthening  4. Transfer Training     TREATMENT PLAN: Frequency/Duration: 3 times a week for duration of hospital stay  Rehabilitation Potential For Stated Goals: [facility] (at time of discharge pending progress):    Placement: It is my opinion, based on this patient's performance to date, that Mr. Anu Wilburn may return to LTC at [facility]. Equipment:    None at this time              HISTORY:   History of Present Injury/Illness (Reason for Referral):  Patient is a assisted living resident with seizure history on 2 agent (keepra and valproic acid)/DM/HTN BIBA for seizure. DR. Castle felt possible ?post ictal. Initially was responsive to him but then had witnessed seizure by staff and given ativan. Not very alert with worsening oxygenation, and acidosis on ABG. He then intubated the patient. BP low and given Neosynephroine. Then another seizure noted and given ativan and loaded keepra at 60 mg/kg.    Past Medical History/Comorbidities:    Adán Matos  has a past medical history of Diabetes ([address] and Hypertension. Mr. Adán Matos  has no past surgical history on file. Social History/Living Environment:   [facility] [address] [facility]   # Steps to Enter: 0  One/Two Story Residence: One story  Living Alone: Yes  Support Systems: Assisted living, Home care staff  Patient Expects to be Discharged to[de-identified] Assisted living  Current DME Used/Available at Home: Wheelchair, Walker, rolling  Tub or Shower Type: Tub/Shower combination  Prior Level of Function/Work/Activity:       Patient reports he primarily uses a w/c and only assists with standing and transferring. Number of Personal Factors/Comorbidities that affect the Plan of Care: 1-2: MODERATE COMPLEXITY   EXAMINATION:   Most Recent Physical Functioning:   Gross Assessment:                  Posture:     Balance:  Sitting: Impaired  Sitting - Static: Good (unsupported)  Sitting - Dynamic: Fair (occasional) Bed Mobility:  Supine to Sit: Total assistance  Sit to Supine: Moderate assistance  Wheelchair Mobility:     Transfers:     Gait:   NT - patient primarily functions with use of a w/c at the assisted living         Body Structures Involved:  1. Joints  2. Muscles Body Functions Affected:  1. Sensory/Pain  2. Neuromusculoskeletal  3. Movement Related Activities and Participation Affected:  1. General Tasks and Demands  2. Mobility  3. Self Care   Number of elements that affect the Plan of Care: 3: MODERATE COMPLEXITY   CLINICAL PRESENTATION:   Presentation: Evolving clinical presentation with changing clinical characteristics: MODERATE COMPLEXITY   CLINICAL DECISION MAKIN [facility] Mobility Inpatient Short Form  How much difficulty does the patient currently have. .. Unable A Lot A Little None   1. Turning over in bed (including adjusting bedclothes, sheets and blankets)? [] 1   [] 2   [x] 3   [] 4   2.   Sitting down on and standing up from a chair with arms ( e.g., wheelchair, bedside commode, etc.)   [] 1   [x] 2   [] 3   [] 4   3. Moving from lying on back to sitting on the side of the bed? [] 1   [x] 2   [] 3   [] 4   How much help from another person does the patient currently need. .. Total A Lot A Little None   4. Moving to and from a bed to a chair (including a wheelchair)? [] 1   [x] 2   [] 3   [] 4   5. Need to walk in hospital room? [x] 1   [] 2   [] 3   [] 4   6. Climbing 3-5 steps with a railing? [x] 1   [] 2   [] 3   [] 4   © 2007, Trustees of [address] [place] [address] [address] [id number], under license to [employer]. All rights reserved      Score:  Initial: 11 Most Recent: X (Date: -- )    Interpretation of Tool:  Represents activities that are increasingly more difficult (i.e. Bed mobility, Transfers, Gait). Medical Necessity:     · Patient is expected to demonstrate progress in   · functional technique  ·  to   · decrease assistance required with bed mobility and SPT. Short distance gait to be assessed  · .  Reason for Services/Other Comments:  · Patient continues to require skilled intervention due to   · medical complications  · . Use of outcome tool(s) and clinical judgement create a POC that gives a: Questionable prediction of patient's progress: MODERATE COMPLEXITY            TREATMENT:   (In addition to Assessment/Re-Assessment sessions the following treatments were rendered)   Pre-treatment Symptoms/Complaints:  See above  Pain: Initial:   Pain Intensity 1: 0  Post Session:  0/10     Therapeutic Activity: (    15 Minutes): Therapeutic activities including bed mobility training, static/dynamic sitting balance, posture training, command following/participation, scooting, and patient education to improve mobility, strength and balance. Required maximal verbal, tactile and manual cues   to promote static and dynamic balance in sitting and promote improved participation.          Braces/Orthotics/Lines/Etc:   · calixto catheter Treatment/Session Assessment:    · Response to Treatment: See above  · Interdisciplinary Collaboration:   o Physical Therapy Assistant  o Registered Nurse  o Rehabilitation Attendant  · After treatment position/precautions:   o Supine in bed  o Bed alarm/tab alert on  o Bed/Chair-wheels locked  o Bed in low position  o Call light within reach  o RN notified   · Compliance with Program/Exercises: Will assess as treatment progresses  · Recommendations/Intent for next treatment session: \"Next visit will focus on advancements to more challenging activities and reduction in assistance provided\".   Total Treatment Duration:  PT Patient Time In/Time Out  Time In: 0810  Time Out: 0825    Naina Toney, PTA

## 2020-07-15 NOTE — PROGRESS NOTES
SPEECH PATHOLOGY NOTE:    Modified barium swallow study complete. Recommend NPO with meds crushed in puree. Full detailed report to follow. RN and MD notified.        [address] CCC-SLP

## 2020-07-15 NOTE — PROGRESS NOTES
Goals updated 7/15/2020  LTG: Patient will tolerate least restrictive diet without overt signs or symptoms of airway compromise. STG: Patient will participate in po trials with ST in efforts to identify safest least restrictive diet. Added 7/15/2020  STG: Patient will participate in repeat modified barium swallow study as clinically indicated. SPEECH LANGUAGE PATHOLOGY: MODIFIED BARIUM SWALLOW STUDY  Initial Assessment    NAME/AGE/GENDER: Sheree Collazo is a 77 y.o. male  DATE: 7/15/2020  PRIMARY DIAGNOSIS: Status epilepticus ([facility] [address] [G40.901]  Status epilepticus ([address] [G40.901]      ICD-10: Treatment Diagnosis: Oropharyngeal dysphagia (R13.12)  INTERDISCIPLINARY COLLABORATION: Radiologist and SLP  PRECAUTIONS/ALLERGIES: Patient has no known allergies. RECOMMENDATIONS/PLAN   DIET:    NPO except meds    MEDICATIONS: Crushed in puree     COMPENSATORY STRATEGIES/MODIFICATIONS INCLUDING:  · Fully awake/alert     OTHER RECOMMENDATIONS (including follow up treatment recommendations):   · Treatment to improve/facilitate oral/pharyngeal skills   · Training in use of compensatory safe swallowing strategies/feeding guidelines  · Patient/family education  · Follow-up MBS as deemed necessary following therapy    Recommendations for next treatment session: Next treatment will address po trials       ASSESSMENT   Mr. Josh Sanchez presents with moderate-severe oropharyngeal dysphagia characterized by reduced labial seal, reduced lingual strength and containment, premature spillage into pharynx with deep laryngeal penetration of thin liquids and nectar thick liquids during the swallow, and non clearing penetration with honey thick liquid residual from reduced clearance through upper esophagus. No penetration or aspiration with pudding. Recommend NPO except meds crushed in puree. Consider short term alternative means of nutrition. Will continue to follow. SUBJECTIVE   Single verbalization. Drowsy.      History of Present Injury/Illness: Mr. Pamela Macdonald  has a past medical history of Diabetes ([address] and Hypertension. Wm Daugherty He also  has no past surgical history on file. Pain:  Pain Intensity 1: 0    Current dietary status prior to evaluation today:  NPO    Previous Modified Barium Swallow studies: n/a    Current Medications:   No current facility-administered medications on file prior to encounter. Current Outpatient Medications on File Prior to Encounter   Medication Sig Dispense Refill    alogliptin (NESINA) 6.25 mg tablet Take 12.5 mg by mouth.  amantadine HCl (SYMMETREL) 100 mg capsule Take 100 mg by mouth.  atorvastatin (LIPITOR) 40 mg tablet Take 20 mg by mouth.  LORazepam (ATIVAN) 0.5 mg tablet Take  by mouth.  tamsulosin (FLOMAX) 0.4 mg capsule Take 0.4 mg by mouth.  levETIRAcetam (KEPPRA) 750 mg tablet Take 1 Tab by mouth two (2) times a day. 60 Tab 0    magnesium oxide (MAG-OX) 400 mg tablet Take 1 Tab by mouth two (2) times a day. 60 Tab 0    albuterol (PROVENTIL HFA, VENTOLIN HFA, PROAIR HFA) 90 mcg/actuation inhaler Take 2 Puffs by inhalation every six (6) hours as needed for Wheezing. 1 Inhaler 0    risperiDONE (RISPERDAL M-TAB) 2 mg disintegrating tablet 1 tab every 8 hours as needed for agitation 20 Tab 0    metFORMIN (GLUCOPHAGE) 1,000 mg tablet Take 1,000 mg by mouth two (2) times a day.  glipiZIDE (GLUCOTROL) 10 mg tablet Take 10 mg by mouth two (2) times a day.  divalproex ER (DEPAKOTE ER) 250 mg ER tablet Take 250 mg by mouth two (2) times a day.  sAXagliptin (ONGLYZA) 5 mg tab tablet Take  by mouth daily.  diphenhydrAMINE (BENADRYL) 25 mg capsule Take 25 mg by mouth three (3) times daily.  polyethylene glycol (MIRALAX) 17 gram packet Take 17 g by mouth daily.  lisinopril (PRINIVIL, ZESTRIL) 10 mg tablet Take 10 mg by mouth daily. Take 1 half tab every day by mouth      propranolol (INDERAL) 10 mg tablet Take  by mouth daily.       aspirin 81 mg chewable tablet Take 81 mg by mouth three (3) times daily as needed for Pain (as needed for pain or fever). OBJECTIVE   Orientation:    no reponse    Oral Assessment:    Labial: Decreased seal and Right droop  Oral Hygiene: Poor  Lingual: Decreased rate and Decreased strength  Vocal Quality: only single verbalization. dysarthric    Modified barium swallow study was performed in the radiology suite with Mr. Silvina Fisher in the lateral plane upright 90° in bed and using C-arm. To evaluate his swallow function, barium coated liquid and food was administered in the form of thin liquids, nectar-thick liquids, honey-thick liquids and pudding. Chewables deferred. Oral phase of swallow:    Inadequate labial seal   anterior loss   reduced bolus control   reduced oral containment   reduced posterior tongue elevation   premature spillage to pharynx pre-swallow   bolus residual on tongue    Pharyngeal phase of swallow:    Thin liquid via tsp: deep laryngeal penetration during the swallow. Cough response ineffective.  Nectar thick liquid via tsp: deep laryngeal penetration on 1 of 2 trials during the swallow    Honey thick liquids via tsp: no aspiration or penetration on first tsp, however penetration of residue as liquid wash after pudding.  Honey thick liquids via cup: no aspiration or penetration on initial cup sip. Deep penetration on second cup sip and mild diffuse residue.  Pudding: initiated at valleculae. no aspiration or penetration. Mild residue at valleculae, pyriforms, and upper esophagus. Pharyngeal characteristics:   reduced retraction of base of tongue   reduced hyolaryngeal elevation/excursion   incomplete epiglottic inversion   reduced constriction of posterior pharyngeal wall   incomplete laryngeal closure    Residue at pyriforms and upper esophagus. Penetration of residue during subsequent swallows. Aspiration/Penetration Scale: 5 (Deep penetration/visible residue. Contrast contacts the folds and is not ejected.)    Cervical esophageal phase of swallow:    decreased opening of upper segment of esophagus   signs of reduced bolus clearance through cervical esophagus    retrograde flow from esophagus into pharynx after swallow  **Distal esophagus not assessed due to limitations of MBS study. Assessment/Reassessment only, no treatment provided today. Tool Used: Dysphagia Outcome and Severity Scale (DEANNE)    Comments   Normal Diet 7 With no strategies or extra time needed   Functional Swallow 6 May have mild oral or pharyngeal delay   Mild Dysphagia 5 Which may require one diet consistency restricted    Mild-Moderate Dysphagia 4 With 1-2 diet consistencies restricted   Moderate Dysphagia 3 With 2 or more diet consistencies restricted   Moderately Severe Dysphagia 2 With partial PO strategies (trials with ST only)   Severe Dysphagia 1 With inability to tolerate any PO safely      Score:  Initial: 2 Most Recent: x (Date:7/15/2020)   Interpretation of Tool: The Dysphagia Outcome and Severity Scale (DEANNE) is a simple, easy-to-use, 7-point scale developed to systematically rate the functional severity of dysphagia based on objective assessment and make recommendations for diet level, independence level, and type of nutrition. [place] / Product Type: Medicare /     Education:  · Recommendations discussed with RN and MD.    Safety:   After treatment position/precautions:  · Patient waiting in radiology holding bay for transport back to room.       Total Treatment Duration:  Time In: 1300   Time Out: [address], [address] CCC-SLP

## 2020-07-15 NOTE — PROGRESS NOTES
Problem: Falls - Risk of  Goal: *Absence of Falls  Description: Document [place] Fall Risk and appropriate interventions in the flowsheet. Outcome: Progressing Towards Goal  Note: Fall Risk Interventions:       Mentation Interventions: Adequate sleep, hydration, pain control, Bed/chair exit alarm, Door open when patient unattended, Increase mobility, More frequent rounding, Reorient patient    Medication Interventions: Bed/chair exit alarm, Patient to call before getting OOB, Teach patient to arise slowly    Elimination Interventions: Bed/chair exit alarm, Call light in reach, Patient to call for help with toileting needs, Stay With Me (per policy), Toilet paper/wipes in reach, Toileting schedule/hourly rounds              Problem: Patient Education: Go to Patient Education Activity  Goal: Patient/Family Education  Outcome: Progressing Towards Goal     Problem: Pressure Injury - Risk of  Goal: *Prevention of pressure injury  Description: Document Jeffery Scale and appropriate interventions in the flowsheet.   Outcome: Progressing Towards Goal  Note: Pressure Injury Interventions:  Sensory Interventions: Assess changes in LOC, Check visual cues for pain, Discuss PT/OT consult with provider, Float heels, Keep linens dry and wrinkle-free, Minimize linen layers, Maintain/enhance activity level, Pressure redistribution bed/mattress (bed type)    Moisture Interventions: Absorbent underpads, Check for incontinence Q2 hours and as needed    Activity Interventions: Increase time out of bed, Pressure redistribution bed/mattress(bed type), PT/OT evaluation    Mobility Interventions: HOB 30 degrees or less, Pressure redistribution bed/mattress (bed type), PT/OT evaluation    Nutrition Interventions: Document food/fluid/supplement intake    Friction and Shear Interventions: HOB 30 degrees or less                Problem: Patient Education: Go to Patient Education Activity  Goal: Patient/Family Education  Outcome: Progressing Towards Goal     Problem: Delirium Treatment  Goal: *Level of consciousness restored to baseline  Outcome: Progressing Towards Goal  Goal: *Level of environmental perceptions restored to baseline  Outcome: Progressing Towards Goal  Goal: *Sensory perception restored to baseline  Outcome: Progressing Towards Goal  Goal: *Emotional stability restored to baseline  Outcome: Progressing Towards Goal  Goal: *Functional assessment restored to baseline  Outcome: Progressing Towards Goal  Goal: *Absence of falls  Outcome: Progressing Towards Goal  Goal: *Will remain free of delirium, CAM Score negative  Outcome: Progressing Towards Goal  Goal: *Cognitive status will be restored to baseline  Outcome: Progressing Towards Goal  Goal: Interventions  Outcome: Progressing Towards Goal     Problem: Patient Education: Go to Patient Education Activity  Goal: Patient/Family Education  Outcome: Progressing Towards Goal     Problem: Patient Education: Go to Patient Education Activity  Goal: Patient/Family Education  Outcome: Progressing Towards Goal     Problem: Nutrition Deficit  Goal: *Optimize nutritional status  Outcome: Progressing Towards Goal     Problem: Seizure Disorder (Adult)  Goal: *STG: Remains free of seizure activity  Outcome: Progressing Towards Goal  Goal: *STG: Maintains lab values within therapeutic range  Outcome: Progressing Towards Goal  Goal: *STG/LTG: Complies with medication therapy  Outcome: Progressing Towards Goal  Goal: *STG: Remains free of injury during seizure activity  Outcome: Progressing Towards Goal  Goal: *STG: Remains safe in hospital  Outcome: Progressing Towards Goal  Goal: Interventions  Outcome: Progressing Towards Goal     Problem: Patient Education: Go to Patient Education Activity  Goal: Patient/Family Education  Outcome: Progressing Towards Goal     Problem: Patient Education: Go to Patient Education Activity  Goal: Patient/Family Education  Outcome: Progressing Towards Goal     Problem: Diabetes Self-Management  Goal: *Disease process and treatment process  Description: Define diabetes and identify own type of diabetes; list 3 options for treating diabetes. Outcome: Progressing Towards Goal  Goal: *Incorporating nutritional management into lifestyle  Description: Describe effect of type, amount and timing of food on blood glucose; list 3 methods for planning meals. Outcome: Progressing Towards Goal  Goal: *Incorporating physical activity into lifestyle  Description: State effect of exercise on blood glucose levels. Outcome: Progressing Towards Goal  Goal: *Developing strategies to promote health/change behavior  Description: Define the ABC's of diabetes; identify appropriate screenings, schedule and personal plan for screenings. Outcome: Progressing Towards Goal  Goal: *Using medications safely  Description: State effect of diabetes medications on diabetes; name diabetes medication taking, action and side effects. Outcome: Progressing Towards Goal  Goal: *Monitoring blood glucose, interpreting and using results  Description: Identify recommended blood glucose targets  and personal targets. Outcome: Progressing Towards Goal  Goal: *Prevention, detection, treatment of acute complications  Description: List symptoms of hyper- and hypoglycemia; describe how to treat low blood sugar and actions for lowering  high blood glucose level. Outcome: Progressing Towards Goal  Goal: *Prevention, detection and treatment of chronic complications  Description: Define the natural course of diabetes and describe the relationship of blood glucose levels to long term complications of diabetes.   Outcome: Progressing Towards Goal  Goal: *Developing strategies to address psychosocial issues  Description: Describe feelings about living with diabetes; identify support needed and support network  Outcome: Progressing Towards Goal  Goal: *Sick day guidelines  Outcome: Progressing Towards Goal     Problem: Patient Education: Go to Patient Education Activity  Goal: Patient/Family Education  Outcome: Progressing Towards Goal

## 2020-07-15 NOTE — PROGRESS NOTES
Hospitalist Note     Admit Date:  7/10/2020 12:55 AM   Name:  Frances Rodriguez   Age:  77 y.o.  :  1945   MRN:  [id number]   PCP:  Rani Ca MD  Treatment Team: Attending Provider: Lester Ferguson MD; Utilization Review: Amanda Hooper; Consulting Provider: Wilfredo Greenberg MD; Care Manager: Elian Alvarado RN; Consulting Provider: Kirsty Verduzco MD; Speech Language Pathologist: Sujata Randolph    HPI/Subjective:       Mr. Yusef Ray is a 76 yo male with PMH of parkinsons disease, schizophrenia, seizures admitted with status epilepticus. He required ICU care for intubation. He has been NPO per SLP evaluations. MBS done 7-15 showed aspiration of thin and nectar. He is on course of IV antibiotics for aspiration pneumonia. 1/ BC GPR- repeat BC NGTD. He has intermittent aggression and required restraints. He has been seen by tele psyche recommending seroquel prn. Discharge plans pending. 7-15-20 sleeping, agitated today and hitting/ spitting , tele reported afib with RVR        Objective:     Patient Vitals for the past 24 hrs:   Temp Pulse Resp BP SpO2   07/15/20 1559 97.4 °F (36.3 °C) 74 18 169/86 93 %   07/15/20 1220 98 °F (36.7 °C) 98 18 160/65 98 %   07/15/20 0750 98.2 °F (36.8 °C) 87 18 188/76 99 %   07/15/20 0441 98.6 °F (37 °C) 87 18 179/90 92 %   20 2306 98.6 °F (37 °C) 74 18 160/73 95 %   20 2049 97.9 °F (36.6 °C) 75 18 164/57 90 %   20 1712 99.5 °F (37.5 °C) 97 18 176/72 92 %     Oxygen Therapy  O2 Sat (%): 93 % (07/15/20 1559)  Pulse via Oximetry: 75 beats per minute (20 0100)  O2 Device: Nasal cannula (20 2300)  O2 Flow Rate (L/min): 3 l/min (20 2300)  FIO2 (%): 30 % (20 0905)    Estimated body mass index is 24.59 kg/m² as calculated from the following:    Height as of this encounter: 5' 7\" (1.702 m). Weight as of this encounter: 71.2 kg (157 lb).       Intake/Output Summary (Last 24 hours) at 7/15/2020 1867  Last data filed at 7/15/2020 0442  Gross per 24 hour   Intake --   Output 1600 ml   Net -1600 ml       *Note that automatically entered I/Os may not be accurate; dependent on patient compliance with collection and accurate  by techs. General:    Sleeping, elderly     Neuro:  Tremor noted    Data Reviewed:  I have reviewed all labs, meds, and studies from the last 24 hours:  Recent Results (from the past 24 hour(s))   GLUCOSE, POC    Collection Time: 07/14/20  8:47 PM   Result Value Ref Range    Glucose (POC) 135 (H) 65 - 100 mg/dL   EKG, 12 LEAD, SUBSEQUENT    Collection Time: 07/15/20  4:15 AM   Result Value Ref Range    Ventricular Rate 81 BPM    Atrial Rate 81 BPM    P-R Interval 162 ms    QRS Duration 76 ms    Q-T Interval 450 ms    QTC Calculation (Bezet) 522 ms    Calculated P Axis 71 degrees    Calculated R Axis 65 degrees    Calculated T Axis 41 degrees    Diagnosis       Sinus rhythm with frequent and consecutive Premature ventricular complexes   and Premature atrial complexes  T wave abnormality, consider anterior ischemia  Prolonged QT  Abnormal ECG  No previous ECGs available  Confirmed by SAMINA TORRES (), MARTA COLEMAN ([id number]) on 7/15/2020 6:22:32 AM     CBC WITH AUTOMATED DIFF    Collection Time: 07/15/20  5:50 AM   Result Value Ref Range    WBC 7.8 4.3 - 11.1 K/uL    RBC 3.10 (L) 4.23 - 5.6 M/uL    HGB 10.0 (L) 13.6 - 17.2 g/dL    HCT 29.7 (L) 41.1 - 50.3 %    MCV 95.8 79.6 - 97.8 FL    MCH 32.3 26.1 - 32.9 PG    MCHC 33.7 31.4 - 35.0 g/dL    RDW 13.5 11.9 - 14.6 %    PLATELET 687 333 - 092 K/uL    MPV 10.5 9.4 - 12.3 FL    ABSOLUTE NRBC 0.00 0.0 - 0.2 K/uL    DF AUTOMATED      NEUTROPHILS 59 43 - 78 %    LYMPHOCYTES 26 13 - 44 %    MONOCYTES 11 4.0 - 12.0 %    EOSINOPHILS 2 0.5 - 7.8 %    BASOPHILS 1 0.0 - 2.0 %    IMMATURE GRANULOCYTES 1 0.0 - 5.0 %    ABS. NEUTROPHILS 4.6 1.7 - 8.2 K/UL    ABS. LYMPHOCYTES 2.1 0.5 - 4.6 K/UL    ABS. MONOCYTES 0.9 0.1 - 1.3 K/UL    ABS. EOSINOPHILS 0.2 0.0 - 0.8 K/UL    ABS. BASOPHILS 0.1 0.0 - 0.2 K/UL    ABS. IMM.  GRANS. 0.0 0.0 - 0.5 K/UL   METABOLIC PANEL, BASIC    Collection Time: 07/15/20  5:50 AM   Result Value Ref Range    Sodium 145 136 - 145 mmol/L    Potassium 3.5 3.5 - 5.1 mmol/L    Chloride 112 (H) 98 - 107 mmol/L    CO2 24 21 - 32 mmol/L    Anion gap 9 7 - 16 mmol/L    Glucose 135 (H) 65 - 100 mg/dL    BUN 13 8 - 23 MG/DL    Creatinine 0.91 0.8 - 1.5 MG/DL    GFR est AA >60 >60 ml/min/1.73m2    GFR est non-AA >60 >60 ml/min/1.73m2    Calcium 8.5 8.3 - 10.4 MG/DL   MAGNESIUM    Collection Time: 07/15/20  5:50 AM   Result Value Ref Range    Magnesium 1.5 (L) 1.8 - 2.4 mg/dL   GLUCOSE, POC    Collection Time: 07/15/20  7:54 AM   Result Value Ref Range    Glucose (POC) 133 (H) 65 - 100 mg/dL   GLUCOSE, POC    Collection Time: 07/15/20 12:26 PM   Result Value Ref Range    Glucose (POC) 131 (H) 65 - 100 mg/dL   GLUCOSE, POC    Collection Time: 07/15/20  4:05 PM   Result Value Ref Range    Glucose (POC) 154 (H) 65 - 100 mg/dL        Current Meds:  Current Facility-Administered Medications   Medication Dose Route Frequency    ampicillin-sulbactam (UNASYN) 3 g in 0.9% sodium chloride (MBP/ADV) 100 mL  3 g IntraVENous Q6H    potassium chloride 20 mEq in 100 ml IVPB  20 mEq IntraVENous Q2H    atorvastatin (LIPITOR) tablet 40 mg  40 mg Oral QHS    lisinopriL (PRINIVIL, ZESTRIL) tablet 10 mg  10 mg Oral DAILY    polyethylene glycol (MIRALAX) packet 17 g  17 g Oral DAILY    propranoloL (INDERAL) tablet 10 mg  10 mg Oral DAILY    QUEtiapine (SEROquel) tablet 6.25 mg  6.25 mg Oral BID PRN    albuterol-ipratropium (DUO-NEB) 2.5 MG-0.5 MG/3 ML  3 mL Nebulization Q4H PRN    carbidopa-levodopa (SINEMET)  mg per tablet 1 Tab  1 Tab Oral TID    lactated Ringers infusion  75 mL/hr IntraVENous CONTINUOUS    insulin lispro (HUMALOG) injection 0-10 Units  0-10 Units SubCUTAneous AC&HS    dextrose 40% (GLUTOSE) oral gel 1 Tube  15 g Oral PRN    glucagon (GLUCAGEN) injection 1 mg 1 mg IntraMUSCular PRN    dextrose (D50W) injection syrg 12.5-25 g  25-50 mL IntraVENous PRN    tamsulosin (FLOMAX) capsule 0.4 mg  0.4 mg Oral DAILY    sodium chloride (NS) flush 5-40 mL  5-40 mL IntraVENous Q8H    sodium chloride (NS) flush 5-40 mL  5-40 mL IntraVENous PRN    heparin (porcine) injection 5,000 Units  5,000 Units SubCUTAneous Q8H    hydrALAZINE (APRESOLINE) 20 mg/mL injection 10 mg  10 mg IntraVENous Q6H PRN    NUTRITIONAL SUPPORT ELECTROLYTE PRN ORDERS   Does Not Apply PRN    levETIRAcetam (KEPPRA) 1,500 mg in 0.9% sodium chloride 100 mL IVPB  1,500 mg IntraVENous Q12H    fosphenytoin (CEREBYX) 100 mg PE in 0.9% sodium chloride 100 mL IVPB  100 mg PE IntraVENous Q8H       Other Studies:  No results found for this visit on 07/10/20. Xr Swallow Func Video    Result Date: 7/15/2020  Modified barium swallow. CLINICAL INDICATION: Oral pharyngeal dysphagia, Parkinson's disease PROCEDURE: The patient was administered various consistencies of liquid and solid barium by the speech pathologist. Direct fluoroscopic guidance was provided. Total fluoroscopic time: 1.23 minutes, zero images saved. FINDINGS: Aspiration was noted with thin barium liquid. Penetration was present with nectar consistency barium. The patient was able to tolerate honey consistency barium and pudding. IMPRESSION: Aspiration of thin barium liquid with penetration of nectar consistent barium. The patient tolerated honey consistency barium well. For more detailed evaluation of the swallowing study please refer to the separately dictated speech pathologist report     Duplex Upper Ext Venous Right    Result Date: 7/14/2020  Right upper extremity venous duplex exam. CLINICAL INDICATION: Right upper extremity swelling. PROCEDURE: Real-time grayscale, color, and spectral Doppler analysis of the right upper extremity deep venous system from the internal jugular vein through the radial and ulnar veins.  COMPARISON: No prior similar study available for direct comparison. FINDINGS: There is normal compressibility where the veins are compressible. No intraluminal echogenic filling defect identified to indicate deep venous thrombosis. IMPRESSION: Negative right upper extremity venous duplex exam. No evidence for DVT.        All Micro Results     Procedure Component Value Units Date/Time    CULTURE, BLOOD [[id number]] Collected:  07/12/20 1515    Order Status:  Completed Specimen:  Blood Updated:  07/15/20 0920     Special Requests: --        LEFT  Antecubital       Culture result: NO GROWTH 3 DAYS       CULTURE, BLOOD [[id number]] Collected:  07/12/20 1520    Order Status:  Completed Specimen:  Blood Updated:  07/15/20 0920     Special Requests: --        LEFT  HAND       Culture result: NO GROWTH 3 DAYS       CULTURE, RESPIRATORY/SPUTUM/BRONCH Ezella Kenny [[id number]] Collected:  07/12/20 1015    Order Status:  Canceled Specimen:  Sputum           SARS-CoV-2 Lab Results  \"Novel Coronavirus\" Test: No results found for: COV2NT   \"Emergent Disease\" Test: No results found for: EDPR  \"SARS-COV-2\" Test: No results found for: XGCOVT         Assessment and Plan:     Hospital Problems as of 7/15/2020 Date Reviewed: 7/12/2020          Codes Class Noted - Resolved POA    Atrial fibrillation ([facility] ICD-10-CM: I48.91  ICD-9-CM: 427.31  7/15/2020 - Present No        Dysphagia ICD-10-CM: R13.10  ICD-9-CM: 787.20  7/12/2020 - Present Yes        SIRS (systemic inflammatory response syndrome) ([facility] ICD-10-CM: R65.10  ICD-9-CM: 995.90  7/11/2020 - Present Yes        DM2 (diabetes mellitus, type 2) ([facility] (Chronic) ICD-10-CM: E11.9  ICD-9-CM: 250.00  7/10/2020 - Present Yes        Acute respiratory failure with hypoxia ([facility] ICD-10-CM: J96.01  ICD-9-CM: 518.81  7/10/2020 - Present Yes        Essential hypertension (Chronic) ICD-10-CM: I10  ICD-9-CM: 401.9  7/10/2020 - Present Yes        * (Principal) Status epilepticus ([facility] ICD-10-CM: G40.901  ICD-9-CM: 345.3 7/9/2020 - Present Yes        RESOLVED: Lactic acid increased ICD-10-CM: E87.2  ICD-9-CM: 276.2  7/10/2020 - 7/12/2020 Yes              Plan:  · Status epilepticus with chronic seizures:  · Continued IV cerebryx and keppra  · Aspiration with pneumonia:  · On unasyn day 4/7  · NPO followed by SLP      · Schizophrenia:  · seroquel prn agitaiton       · parkinsons disease:  · Continued sinemet      · DM2:  · SSI      · HTN:   · Continued lisinopril  · Adding coreg    · Hypokalemia and hypomagnesemia:  · Replace and repeat labs       · AFIB with RVR:  · Stop propranolol and add coreg  · followup tele  · Poor Anticoagulation candidate   · Check ECHO  · Currently stable NSR 62      DC planning/Dispo:  Pending, will need covid testing prior to SNF       Diet:  DIET NPO  DVT ppx:      Signed:  Sim Barrientos MD

## 2020-07-15 NOTE — PROGRESS NOTES
Notified Dr Erika Montoya that patient had a 5 beat run of SVT and unsure if patient was in NSR or A fib due to parkinson's. Received order for EKG.

## 2020-07-16 LAB
ANION GAP SERPL CALC-SCNC: 10 MMOL/L (ref 7–16)
BASOPHILS # BLD: 0.1 K/UL (ref 0–0.2)
BASOPHILS NFR BLD: 1 % (ref 0–2)
BUN SERPL-MCNC: 10 MG/DL (ref 8–23)
CALCIUM SERPL-MCNC: 8.3 MG/DL (ref 8.3–10.4)
CHLORIDE SERPL-SCNC: 107 MMOL/L (ref 98–107)
CO2 SERPL-SCNC: 26 MMOL/L (ref 21–32)
CREAT SERPL-MCNC: 0.84 MG/DL (ref 0.8–1.5)
DIFFERENTIAL METHOD BLD: ABNORMAL
EOSINOPHIL # BLD: 0.4 K/UL (ref 0–0.8)
EOSINOPHIL NFR BLD: 5 % (ref 0.5–7.8)
ERYTHROCYTE [DISTWIDTH] IN BLOOD BY AUTOMATED COUNT: 13.2 % (ref 11.9–14.6)
GLUCOSE BLD STRIP.AUTO-MCNC: 104 MG/DL (ref 65–100)
GLUCOSE BLD STRIP.AUTO-MCNC: 116 MG/DL (ref 65–100)
GLUCOSE BLD STRIP.AUTO-MCNC: 156 MG/DL (ref 65–100)
GLUCOSE BLD STRIP.AUTO-MCNC: 171 MG/DL (ref 65–100)
GLUCOSE SERPL-MCNC: 134 MG/DL (ref 65–100)
HCT VFR BLD AUTO: 34.7 % (ref 41.1–50.3)
HGB BLD-MCNC: 11.2 G/DL (ref 13.6–17.2)
IMM GRANULOCYTES # BLD AUTO: 0.1 K/UL (ref 0–0.5)
IMM GRANULOCYTES NFR BLD AUTO: 1 % (ref 0–5)
LYMPHOCYTES # BLD: 2.2 K/UL (ref 0.5–4.6)
LYMPHOCYTES NFR BLD: 27 % (ref 13–44)
MCH RBC QN AUTO: 31.5 PG (ref 26.1–32.9)
MCHC RBC AUTO-ENTMCNC: 32.3 G/DL (ref 31.4–35)
MCV RBC AUTO: 97.7 FL (ref 79.6–97.8)
MONOCYTES # BLD: 0.9 K/UL (ref 0.1–1.3)
MONOCYTES NFR BLD: 11 % (ref 4–12)
NEUTS SEG # BLD: 4.5 K/UL (ref 1.7–8.2)
NEUTS SEG NFR BLD: 57 % (ref 43–78)
NRBC # BLD: 0 K/UL (ref 0–0.2)
PLATELET # BLD AUTO: 234 K/UL (ref 150–450)
PMV BLD AUTO: 10.6 FL (ref 9.4–12.3)
POTASSIUM SERPL-SCNC: 3.8 MMOL/L (ref 3.5–5.1)
RBC # BLD AUTO: 3.55 M/UL (ref 4.23–5.6)
SODIUM SERPL-SCNC: 143 MMOL/L (ref 136–145)
WBC # BLD AUTO: 8 K/UL (ref 4.3–11.1)

## 2020-07-16 PROCEDURE — 74011000258 HC RX REV CODE- 258: Performed by: PSYCHIATRY & NEUROLOGY

## 2020-07-16 PROCEDURE — 76450000000

## 2020-07-16 PROCEDURE — 74011250637 HC RX REV CODE- 250/637: Performed by: INTERNAL MEDICINE

## 2020-07-16 PROCEDURE — 74011250636 HC RX REV CODE- 250/636: Performed by: INTERNAL MEDICINE

## 2020-07-16 PROCEDURE — 82962 GLUCOSE BLOOD TEST: CPT

## 2020-07-16 PROCEDURE — 74011250636 HC RX REV CODE- 250/636: Performed by: PSYCHIATRY & NEUROLOGY

## 2020-07-16 PROCEDURE — 36415 COLL VENOUS BLD VENIPUNCTURE: CPT

## 2020-07-16 PROCEDURE — 97530 THERAPEUTIC ACTIVITIES: CPT

## 2020-07-16 PROCEDURE — 92526 ORAL FUNCTION THERAPY: CPT

## 2020-07-16 PROCEDURE — 74011250637 HC RX REV CODE- 250/637: Performed by: FAMILY MEDICINE

## 2020-07-16 PROCEDURE — 65660000000 HC RM CCU STEPDOWN

## 2020-07-16 PROCEDURE — 80048 BASIC METABOLIC PNL TOTAL CA: CPT

## 2020-07-16 PROCEDURE — 85025 COMPLETE CBC W/AUTO DIFF WBC: CPT

## 2020-07-16 PROCEDURE — 74011000258 HC RX REV CODE- 258: Performed by: INTERNAL MEDICINE

## 2020-07-16 PROCEDURE — 74011250637 HC RX REV CODE- 250/637: Performed by: PSYCHIATRY & NEUROLOGY

## 2020-07-16 RX ADMIN — LEVETIRACETAM 1500 MG: 500 INJECTION, SOLUTION INTRAVENOUS at 07:39

## 2020-07-16 RX ADMIN — HEPARIN SODIUM 5000 UNITS: 5000 INJECTION INTRAVENOUS; SUBCUTANEOUS at 06:05

## 2020-07-16 RX ADMIN — FOSPHENYTOIN 100 MG PE: 50 INJECTION INTRAMUSCULAR; INTRAVENOUS at 14:51

## 2020-07-16 RX ADMIN — SODIUM CHLORIDE 3 G: 900 INJECTION, SOLUTION INTRAVENOUS at 08:00

## 2020-07-16 RX ADMIN — FOSPHENYTOIN 100 MG PE: 50 INJECTION INTRAMUSCULAR; INTRAVENOUS at 06:05

## 2020-07-16 RX ADMIN — LEVETIRACETAM 1500 MG: 500 INJECTION, SOLUTION INTRAVENOUS at 21:44

## 2020-07-16 RX ADMIN — QUETIAPINE FUMARATE 6.25 MG: 25 TABLET ORAL at 11:17

## 2020-07-16 RX ADMIN — SODIUM CHLORIDE 3 G: 900 INJECTION, SOLUTION INTRAVENOUS at 21:44

## 2020-07-16 RX ADMIN — CARBIDOPA AND LEVODOPA 1 TABLET: 25; 100 TABLET ORAL at 07:42

## 2020-07-16 RX ADMIN — Medication 10 ML: at 11:05

## 2020-07-16 RX ADMIN — HEPARIN SODIUM 5000 UNITS: 5000 INJECTION INTRAVENOUS; SUBCUTANEOUS at 14:37

## 2020-07-16 RX ADMIN — SODIUM CHLORIDE 3 G: 900 INJECTION, SOLUTION INTRAVENOUS at 02:43

## 2020-07-16 RX ADMIN — Medication 10 ML: at 06:08

## 2020-07-16 RX ADMIN — CARVEDILOL 6.25 MG: 6.25 TABLET, FILM COATED ORAL at 07:42

## 2020-07-16 RX ADMIN — CARBIDOPA AND LEVODOPA 1 TABLET: 25; 100 TABLET ORAL at 14:54

## 2020-07-16 RX ADMIN — LISINOPRIL 10 MG: 5 TABLET ORAL at 07:42

## 2020-07-16 RX ADMIN — CARBIDOPA AND LEVODOPA 1 TABLET: 25; 100 TABLET ORAL at 21:51

## 2020-07-16 RX ADMIN — HEPARIN SODIUM 5000 UNITS: 5000 INJECTION INTRAVENOUS; SUBCUTANEOUS at 21:57

## 2020-07-16 RX ADMIN — SODIUM CHLORIDE 3 G: 900 INJECTION, SOLUTION INTRAVENOUS at 14:55

## 2020-07-16 RX ADMIN — Medication 10 ML: at 22:00

## 2020-07-16 RX ADMIN — FOSPHENYTOIN 100 MG PE: 50 INJECTION INTRAMUSCULAR; INTRAVENOUS at 22:32

## 2020-07-16 RX ADMIN — SODIUM CHLORIDE, SODIUM LACTATE, POTASSIUM CHLORIDE, AND CALCIUM CHLORIDE 75 ML/HR: 600; 310; 30; 20 INJECTION, SOLUTION INTRAVENOUS at 06:08

## 2020-07-16 RX ADMIN — ATORVASTATIN CALCIUM 40 MG: 40 TABLET, FILM COATED ORAL at 21:51

## 2020-07-16 RX ADMIN — TAMSULOSIN HYDROCHLORIDE 0.4 MG: 0.4 CAPSULE ORAL at 07:42

## 2020-07-16 NOTE — PROGRESS NOTES
Echo attempted, but patient agitated and aggressive during exam. Nurse notified that exam was not able to be performed.

## 2020-07-16 NOTE — PROGRESS NOTES
Comprehensive Nutrition Assessment    Type and Reason for Visit: Reassess   BPA via malnutrition screening tool (unsure/unsure)    Nutrition Recommendations/Plan: Recommend placement of NGT and initiation of TF if c/w goals of care. Please consult nutrition services for management of TF if warranted. Recommend magnesium replacement. Nutrition Assessment:  Hx of DM, parkinson's. Presented with seizure. Intubated in ED at [facility] 7/9 and transferred to [facility]. Exutbated 7/11. Diet advanced to pureed/nectar on 7/14 per SLP. S/P MBS yesterday with recommendations of NPO status. When I entered room, pt asked \"when can I eat. \"   Palliative Care assessed and signed off: Pt is DNR. Pertinent Labs: Mg 1.5  Pertinent Medications: sinemet TID, unasyn, cerebyx   IVF: LR @ 75 ml/hr    Estimated Daily Nutrient Needs: *weight used: 71.3 kg  Energy (kcal):  3678-4942 kcal/d (20-25 kcal/kg)  Protein (g):  73-80 gm/d (0.8-1 gm/kg)       Fluid (ml/day):  1 ml/kcal or per MD    Current Nutrition Therapies:   DIET NPO Except Meds    Anthropometric Measures:  · Height:  5' 7\" (170.2 cm)  · Current Body Wt:  71.2 kg (156 lb 15.5 oz)   · Ideal Body Wt:  148:  106.1 %   · BMI  24.6  · BMI Categories:  Normal weight (BMI 22.0-24.9) age over 72       Nutrition Diagnosis:   · Inadequate oral intake related to swallowing difficulty as evidenced by (NPO per SLP)    Nutrition Interventions:   Food and/or Nutrient Delivery: Continue NPO per SLP  Coordination of Nutrition Care: (POC discussed with Swapnil Easton RN and Dr. Jose Roberto Junior)    Goals:  Meet at least 75% estimated needs within 7 days either via PO diet intake or via EN. Nutrition Monitoring and Evaluation:   Food/Nutrient Intake Outcomes: Diet advancement/tolerance    Discharge Planning:     Too soon to determine     Electronically signed by Ez Valle[address], [address] [place] [address], [place], [address]  on 7/16/2020 at 11:17 AM

## 2020-07-16 NOTE — PROGRESS NOTES
Problem: Mobility Impaired (Adult and Pediatric)  Goal: *Acute Goals and Plan of Care (Insert Text)  Outcome: Progressing Towards Goal  Note: Goals:  (1.)Mr. Teresa Crooks will move from supine to sit and sit to supine , scoot up and down, and roll side to side with CONTACT GUARD ASSIST within 4 treatment day(s). (2.)Mr. Teresa Crooks will transfer from bed to chair and chair to bed with CONTACT GUARD ASSIST using the least restrictive device within 4 treatment day(s). (3.)Mr. Teresa Crooks will participate in BLE AROM strength exercises in supine and sitting to improve functional mobility within 4 treatment day(s). (4.)Mr. Teresa Crooks will be assessed for gait as appropriate once standing and SPTs have been achieved. PHYSICAL THERAPY: Daily Note and AM 7/16/2020  INPATIENT: PT Visit Days : 3  [place] / [place] / Product Type: Medicare /       NAME/AGE/GENDER: Krystal Mercedes is a 77 y.o. male   PRIMARY DIAGNOSIS: Status epilepticus ([address] [G40.901]  Status epilepticus ([address] [G40.901] Status epilepticus ([address] Status epilepticus ([address]       ICD-10: Treatment Diagnosis:    · Generalized Muscle Weakness (M62.81)  · Difficulty in walking, Not elsewhere classified (R26.2)  · Other abnormalities of gait and mobility (R26.89)   Precaution/Allergies:  Patient has no known allergies. ASSESSMENT:     Mr. Teresa Crooks is a 77year old WM with an admitting diagnosis of Status epilepticus. His PMH includes Parkinson's and Hx of epilepsy and schizophrenia. Patient was supine upon contact and agreeable to PT. Patient participated better with physical therapy this morning and even engaged in appropriate conversation although slow and delayed at times. Patient performs supine to sit with max assist, additional time, and cues for technique. Once seated EOB patient demonstrates good static sitting balance. Patient agreeable to transfer training today.  Patient transfers to standing with mod assist x 2 and cues for technique. Once standing patient was propping on the bed but was able to tolerate static standing x 8 minutes. Patient repeatedly stated, \"I just want to be able to walk again\". Patient would attempt to take steps but unable to lift either LE off the floor and advance it forward. Patient returns to sitting EOB to rest. Patient returned to supine with mod assist and cues for technique. Good session today with progress in participation, transfers, and overall mobility. Patient's goals listed above are still appropriate. Will continue skilled PT to address remaining deficits. This section established at most recent assessment   PROBLEM LIST (Impairments causing functional limitations):  1. Decreased Strength  2. Decreased ADL/Functional Activities  3. Decreased Transfer Abilities  4. Decreased Ambulation Ability/Technique  5. Decreased Balance  6. Decreased Activity Tolerance  7. Increased Fatigue   INTERVENTIONS PLANNED: (Benefits and precautions of physical therapy have been discussed with the patient.)  1. Bed Mobility  2. Therapeutic Activites  3. Therapeutic Exercise/Strengthening  4. Transfer Training     TREATMENT PLAN: Frequency/Duration: 3 times a week for duration of hospital stay  Rehabilitation Potential For Stated Goals: [facility] (at time of discharge pending progress):    Placement: It is my opinion, based on this patient's performance to date, that Mr. Radu Anton may return to LTC at [facility]. Equipment:    None at this time              HISTORY:   History of Present Injury/Illness (Reason for Referral):  Patient is a assisted living resident with seizure history on 2 agent (keepra and valproic acid)/DM/HTN BIBA for seizure. DR. Castle felt possible ?post ictal. Initially was responsive to him but then had witnessed seizure by staff and given ativan. Not very alert with worsening oxygenation, and acidosis on ABG. He then intubated the patient.  BP low and given Neosynephroine. Then another seizure noted and given ativan and loaded keepra at 60 mg/kg. Past Medical History/Comorbidities:   Mr. Lakeisha Varela  has a past medical history of Diabetes ([address] and Hypertension. Mr. Lakeisha Varela  has no past surgical history on file. Social History/Living Environment:   Home Environment: [address] [place] [address] Road Name: [address] [place] [address]  # Steps to Enter: 0  One/Two Story Residence: One story  Living Alone: Yes  Support Systems: Assisted living, Home care staff  Patient Expects to be Discharged to[de-identified] Assisted living  Current DME Used/Available at Home: Wheelchair, Walker, rolling  Tub or Shower Type: Tub/Shower combination  Prior Level of Function/Work/Activity:       Patient reports he primarily uses a w/c and only assists with standing and transferring. Number of Personal Factors/Comorbidities that affect the Plan of Care: 1-2: MODERATE COMPLEXITY   EXAMINATION:   Most Recent Physical Functioning:   Gross Assessment:                  Posture:     Balance:  Sitting: Impaired  Sitting - Static: Good (unsupported)  Sitting - Dynamic: Fair (occasional)  Standing: Impaired  Standing - Static: Poor  Standing - Dynamic : Poor Bed Mobility:  Supine to Sit: Maximum assistance  Sit to Supine: Moderate assistance  Wheelchair Mobility:     Transfers:  Sit to Stand: Moderate assistance;Assist x2  Stand to Sit: Moderate assistance;Assist x2  Gait:   NT - patient primarily functions with use of a w/c at the assisted living         Body Structures Involved:  1. Joints  2. Muscles Body Functions Affected:  1. Sensory/Pain  2. Neuromusculoskeletal  3. Movement Related Activities and Participation Affected:  1. General Tasks and Demands  2. Mobility  3.  Self Care   Number of elements that affect the Plan of Care: 3: MODERATE COMPLEXITY   CLINICAL PRESENTATION:   Presentation: Evolving clinical presentation with changing clinical characteristics: MODERATE COMPLEXITY   CLINICAL DECISION MAKING:   [employer] AM-PAC 6 Clicks   Basic Mobility Inpatient Short Form  How much difficulty does the patient currently have. .. Unable A Lot A Little None   1. Turning over in bed (including adjusting bedclothes, sheets and blankets)? [] 1   [] 2   [x] 3   [] 4   2. Sitting down on and standing up from a chair with arms ( e.g., wheelchair, bedside commode, etc.)   [] 1   [x] 2   [] 3   [] 4   3. Moving from lying on back to sitting on the side of the bed? [] 1   [x] 2   [] 3   [] 4   How much help from another person does the patient currently need. .. Total A Lot A Little None   4. Moving to and from a bed to a chair (including a wheelchair)? [] 1   [x] 2   [] 3   [] 4   5. Need to walk in hospital room? [x] 1   [] 2   [] 3   [] 4   6. Climbing 3-5 steps with a railing? [x] 1   [] 2   [] 3   [] 4   © 2007, Trustees of [address] [place] [address], under license to [employer]. All rights reserved      Score:  Initial: 11 Most Recent: X (Date: -- )    Interpretation of Tool:  Represents activities that are increasingly more difficult (i.e. Bed mobility, Transfers, Gait). Medical Necessity:     · Patient is expected to demonstrate progress in   · functional technique  ·  to   · decrease assistance required with bed mobility and SPT. Short distance gait to be assessed  · .  Reason for Services/Other Comments:  · Patient continues to require skilled intervention due to   · medical complications  · . Use of outcome tool(s) and clinical judgement create a POC that gives a: Questionable prediction of patient's progress: MODERATE COMPLEXITY            TREATMENT:   (In addition to Assessment/Re-Assessment sessions the following treatments were rendered)   Pre-treatment Symptoms/Complaints:  See above  Pain: Initial:   Pain Intensity 1: 0  Post Session:  0/10     Therapeutic Activity: (    25 Minutes):   Therapeutic activities including bed mobility training, static/dynamic sitting/standing balance, posture training, command following, transfer training, scooting, and patient education to improve mobility, strength and balance. Required maximal verbal, tactile and manual cues   to promote static and dynamic balance in sitting and promote improved participation. Braces/Orthotics/Lines/Etc:   · calixto catheter   Treatment/Session Assessment:    · Response to Treatment: See above  · Interdisciplinary Collaboration:   o Physical Therapy Assistant  o Registered Nurse  o Rehabilitation Attendant  · After treatment position/precautions:   o Supine in bed  o Bed alarm/tab alert on  o Bed/Chair-wheels locked  o Bed in low position  o Call light within reach  o RN notified   · Compliance with Program/Exercises: Will assess as treatment progresses  · Recommendations/Intent for next treatment session: \"Next visit will focus on advancements to more challenging activities and reduction in assistance provided\".   Total Treatment Duration:  PT Patient Time In/Time Out  Time In: 1130  Time Out: 541 Guilherme Collado, PTA

## 2020-07-16 NOTE — PROGRESS NOTES
Goals updated 7/15/2020  LTG: Patient will tolerate least restrictive diet without overt signs or symptoms of airway compromise. STG: Patient will participate in po trials with ST in efforts to identify safest least restrictive diet. Added 7/15/2020  STG: Patient will participate in repeat modified barium swallow study as clinically indicated. SPEECH LANGUAGE PATHOLOGY: DYSPHAGIA- Daily Note 1    NAME/AGE/GENDER: Yessi Ayala is a 77 y.o. male  DATE: 7/16/2020  PRIMARY DIAGNOSIS: Status epilepticus ([facility] [G40.901]  Status epilepticus ([facility]      ICD-10: Treatment Diagnosis: R13.12 Dysphagia, Oropharyngeal Phase    RECOMMENDATIONS   DIET:    NPO    MEDICATIONS: Crushed in puree     ASPIRATION PRECAUTIONS  · Slow rate of intake  · Small bites/sips  · Upright at 90 degrees during meal     COMPENSATORY STRATEGIES/MODIFICATIONS  · None     EDUCATION:  · Recommendations discussed with Hospitalist  · Nursing  · Patient     CONTINUATION OF SKILLED SERVICES/MEDICAL NECESSITY:   Patient is expected to demonstrate progress in  swallow strength, swallow timeliness, swallow function and swallow safety in order to  improve swallow safety, work toward diet advancement and decrease aspiration risk.  Patient continues to require skilled intervention due to dysphagia. RECOMMENDATIONS for CONTINUED SPEECH THERAPY:   YES: Anticipate need for ongoing speech therapy during this hospitalization and at next level of care. ASSESSMENT   Patient presents with moderate-severe opharyngeal dysphagia as idenitified on modified barium swallow study 7/15/2020. Patient requesting food/drink and becomes agitated with attempts to explain role of SLP, exercises, and results of swallow study. Continues to present with s/sx pharyngeal dysphagia and pharyngeal residue with po intake. Recommend continue NPO with meds crushed in puree.  Unclear goals of care at this time regarding alternative means of nutrition vs. Accepting risks of aspiration. Patient very eager to [address] Will continue to follow for exercises. COMPLIANCE WITH PROGRAM/EXERCISES: Will assess as treatment progresses  REHABILITATION POTENTIAL FOR STATED GOALS: Good    PLAN    FREQUENCY/DURATION: Continue to follow patient 3 times a week for duration of hospital stay to address above goals. - Recommendations for next treatment session: Next treatment will address dysphagia exercises    SUBJECTIVE   Upright in bed. Wants armond mckenzie, notified RN. Requesting turkey sandwich with mustard. Problem List:  (Impairments causing functional limitations):  1. Oropharyngeal dysphagia     Orientation:   Person    Pain: Pain Scale 1: FLACC  Pain Intensity 1: 0    OBJECTIVE   Patient seen for po trials and exercises. Right sided facial weakness, but significantly improved right sided labial droop. Patient consumed trials of pudding with effortful swallow x12. Patient noted to independently swallow 2-3x per bite with delayed throat clearing on 2 trials concerning for pharyngeal residue. INTERDISCIPLINARY COLLABORATION: Registered Nurse  PRECAUTIONS/ALLERGIES: Patient has no known allergies.      Tool Used: Dysphagia Outcome and Severity Scale (DEANNE)    Score Comments   Normal Diet  [] 7 With no strategies or extra time needed   Functional Swallow  [] 6 May have mild oral or pharyngeal delay   Mild Dysphagia  [] 5 Which may require one diet consistency restricted    Mild-Moderate Dysphagia  [] 4 With 1-2 diet consistencies restricted   Moderate Dysphagia  [] 3 With 2 or more diet consistencies restricted   Moderate-Severe Dysphagia  [] 2 With partial PO strategies (trials with ST only)   Severe Dysphagia  [] 1 With inability to tolerate any PO safely      Score:  Initial: 2 Most Recent: 2 (Date 07/16/20 )   Interpretation of Tool: The Dysphagia Outcome and Severity Scale (DEANNE) is a simple, easy-to-use, 7-point scale developed to systematically rate the functional severity of dysphagia based on objective assessment and make recommendations for diet level, independence level, and type of nutrition.      After treatment position/precautions:  · Upright in bed  · RN notified  · Call light within reach    Total Treatment Duration:   Time In: 8914  Time Out: 176 [facility], [facility], [facility], [address] [facility]

## 2020-07-16 NOTE — PROGRESS NOTES
Hospitalist Note     Admit Date:  7/10/2020 12:55 AM   Name:  Tru Pickett   Age:  77 y.o.  :  1945   MRN:  [id number]   PCP:  Janna Crenshaw MD  Treatment Team: Attending Provider: Natanael Oakes MD; Utilization Review: Feroz Staley; Consulting Provider: Ness Herron MD; Care Manager: Doug Hanley RN; Consulting Provider: Chantal Dennis MD; Charge Nurse: Camille Maki    HPI/Subjective:       Mr. Valrie Kawasaki is a 76 yo male with PMH of parkinsons disease, schizophrenia, seizures admitted with status epilepticus. He required ICU care for intubation. He has been NPO per SLP evaluations. MBS done 7-15 showed aspiration of thin and nectar. He is on course of IV antibiotics for aspiration pneumonia. 1/ BC GPR- repeat BC NGTD. He has intermittent aggression and required restraints. He has been seen by tele psyche recommending seroquel prn. On 7-15-20 tele reported afib with RVR that spontaneously converted. ECHO ordered. Not anticoagulation candidate. Discharge plans pending. 20 wants to eat, asking for turkey sandwich, states \"im my own boss\"      Objective:     Patient Vitals for the past 24 hrs:   Temp Pulse Resp BP SpO2   20 1459 98.3 °F (36.8 °C) (!) 40 16 158/73 92 %   20 1104 98.2 °F (36.8 °C) 72 15 92/68 95 %   20 0819 97.7 °F (36.5 °C) 72 17 113/80 95 %   20 0604 98 °F (36.7 °C) 70 17 109/64 95 %   20 0039 98.5 °F (36.9 °C) 82 18 125/81 95 %   07/15/20 2019 98.8 °F (37.1 °C) 67 18 149/68 94 %   07/15/20 1931 -- -- -- -- 95 %     Oxygen Therapy  O2 Sat (%): 92 % (20 1459)  Pulse via Oximetry: 137 beats per minute (07/15/20 1931)  O2 Device: Room air (07/15/20 1931)  O2 Flow Rate (L/min): 3 l/min (20)  FIO2 (%): 30 % (20)    Estimated body mass index is 24.59 kg/m² as calculated from the following:    Height as of this encounter: 5' 7\" (1.702 m). Weight as of this encounter: 71.2 kg (157 lb).     No intake or output data in the 24 hours ending 07/16/20 1721    *Note that automatically entered I/Os may not be accurate; dependent on patient compliance with collection and accurate  by techs.     General:    No distress, elderly   CVS:  RRR, no edema   PULM:  Clear anterior   Neuro:  Tremor noted  Psyche: Keeps eyes closed     Data Reviewed:  I have reviewed all labs, meds, and studies from the last 24 hours:  Recent Results (from the past 24 hour(s))   GLUCOSE, POC    Collection Time: 07/15/20  9:28 PM   Result Value Ref Range    Glucose (POC) 138 (H) 65 - 100 mg/dL   GLUCOSE, POC    Collection Time: 07/16/20  8:03 AM   Result Value Ref Range    Glucose (POC) 104 (H) 65 - 100 mg/dL   GLUCOSE, POC    Collection Time: 07/16/20 11:07 AM   Result Value Ref Range    Glucose (POC) 116 (H) 65 - 100 mg/dL   GLUCOSE, POC    Collection Time: 07/16/20  4:14 PM   Result Value Ref Range    Glucose (POC) 171 (H) 65 - 100 mg/dL        Current Meds:  Current Facility-Administered Medications   Medication Dose Route Frequency    ampicillin-sulbactam (UNASYN) 3 g in 0.9% sodium chloride (MBP/ADV) 100 mL  3 g IntraVENous Q6H    carvediloL (COREG) tablet 6.25 mg  6.25 mg Oral BID WITH MEALS    atorvastatin (LIPITOR) tablet 40 mg  40 mg Oral QHS    lisinopriL (PRINIVIL, ZESTRIL) tablet 10 mg  10 mg Oral DAILY    polyethylene glycol (MIRALAX) packet 17 g  17 g Oral DAILY    QUEtiapine (SEROquel) tablet 6.25 mg  6.25 mg Oral BID PRN    albuterol-ipratropium (DUO-NEB) 2.5 MG-0.5 MG/3 ML  3 mL Nebulization Q4H PRN    carbidopa-levodopa (SINEMET)  mg per tablet 1 Tab  1 Tab Oral TID    lactated Ringers infusion  75 mL/hr IntraVENous CONTINUOUS    insulin lispro (HUMALOG) injection 0-10 Units  0-10 Units SubCUTAneous AC&HS    dextrose 40% (GLUTOSE) oral gel 1 Tube  15 g Oral PRN    glucagon (GLUCAGEN) injection 1 mg  1 mg IntraMUSCular PRN    dextrose (D50W) injection syrg 12.5-25 g  25-50 mL IntraVENous PRN    tamsulosin (FLOMAX) capsule 0.4 mg  0.4 mg Oral DAILY    sodium chloride (NS) flush 5-40 mL  5-40 mL IntraVENous Q8H    sodium chloride (NS) flush 5-40 mL  5-40 mL IntraVENous PRN    heparin (porcine) injection 5,000 Units  5,000 Units SubCUTAneous Q8H    hydrALAZINE (APRESOLINE) 20 mg/mL injection 10 mg  10 mg IntraVENous Q6H PRN    NUTRITIONAL SUPPORT ELECTROLYTE PRN ORDERS   Does Not Apply PRN    levETIRAcetam (KEPPRA) 1,500 mg in 0.9% sodium chloride 100 mL IVPB  1,500 mg IntraVENous Q12H    fosphenytoin (CEREBYX) 100 mg PE in 0.9% sodium chloride 100 mL IVPB  100 mg PE IntraVENous Q8H       Other Studies:  No results found for this visit on 07/10/20. No results found.     All Micro Results     Procedure Component Value Units Date/Time    CULTURE, BLOOD [[id number]] Collected:  07/12/20 1515    Order Status:  Completed Specimen:  Blood Updated:  07/16/20 1006     Special Requests: --        LEFT  Antecubital       Culture result: NO GROWTH 4 DAYS       CULTURE, BLOOD [[id number]] Collected:  07/12/20 1520    Order Status:  Completed Specimen:  Blood Updated:  07/16/20 1006     Special Requests: --        LEFT  HAND       Culture result: NO GROWTH 4 DAYS       CULTURE, RESPIRATORY/SPUTUM/BRONCH Mary Prema [[id number]] Collected:  07/12/20 1015    Order Status:  Canceled Specimen:  Sputum           SARS-CoV-2 Lab Results  \"Novel Coronavirus\" Test: No results found for: COV2NT   \"Emergent Disease\" Test: No results found for: EDPR  \"SARS-COV-2\" Test: No results found for: XGCOVT         Assessment and Plan:     Hospital Problems as of 7/16/2020 Date Reviewed: 7/12/2020          Codes Class Noted - Resolved POA    Atrial fibrillation ([facility] ICD-10-CM: I48.91  ICD-9-CM: 427.31  7/15/2020 - Present No        Dysphagia ICD-10-CM: R13.10  ICD-9-CM: 787.20  7/12/2020 - Present Yes        SIRS (systemic inflammatory response syndrome) ([facility] ICD-10-CM: R65.10  ICD-9-CM: 995.90  7/11/2020 - Present Yes        DM2 (diabetes mellitus, type 2) (HCC) (Chronic) ICD-10-CM: E11.9  ICD-9-CM: 250.00  7/10/2020 - Present Yes        Acute respiratory failure with hypoxia [facility]) ICD-10-CM: J96.01  ICD-9-CM: 518.81  7/10/2020 - Present Yes        Essential hypertension (Chronic) ICD-10-CM: I10  ICD-9-CM: 401.9  7/10/2020 - Present Yes        * (Principal) Status epilepticus ([address] ICD-10-CM: G40.901  ICD-9-CM: 345.3  7/9/2020 - Present Yes        RESOLVED: Lactic acid increased ICD-10-CM: E87.2  ICD-9-CM: 276.2  7/10/2020 - 7/12/2020 Yes              Plan:  · Status epilepticus with chronic seizures:  · Continued IV cerebryx and keppra      · Aspiration with pneumonia:  · On unasyn day 5/7  · NPO followed by SLP  · Unable to reach family for nutritional discussions   · Needs some reliable nutrition  · Palliative care consult      · Schizophrenia:  · seroquel prn agitaiton       · parkinsons disease:  · Continued sinemet      · DM2:  · SSI      · HTN:   · Continued lisinopril, coreg    · Hypokalemia and hypomagnesemia:  · Replace and repeat labs       · AFIB with RVR:  · Stopped propranolol and added coreg  · followup tele  · Poor Anticoagulation candidate   · Check ECHO- he refused today  · Currently stable NSR      DC planning/Dispo:  Pending, will need covid testing prior to SNF       Diet:  DIET NPO  DVT ppx:  heparin    Signed:  Donovan Wang MD

## 2020-07-16 NOTE — PROGRESS NOTES
Problem: Falls - Risk of  Goal: *Absence of Falls  Description: Document Keyshawn Stade Fall Risk and appropriate interventions in the flowsheet. Outcome: Progressing Towards Goal  Note: Fall Risk Interventions:       Mentation Interventions: Adequate sleep, hydration, pain control, Bed/chair exit alarm, Door open when patient unattended, Increase mobility, More frequent rounding, Reorient patient    Medication Interventions: Patient to call before getting OOB, Teach patient to arise slowly, Bed/chair exit alarm    Elimination Interventions: Bed/chair exit alarm, Call light in reach, Patient to call for help with toileting needs, Stay With Me (per policy), Toilet paper/wipes in reach, Toileting schedule/hourly rounds              Problem: Patient Education: Go to Patient Education Activity  Goal: Patient/Family Education  Outcome: Progressing Towards Goal     Problem: Pressure Injury - Risk of  Goal: *Prevention of pressure injury  Description: Document Jeffery Scale and appropriate interventions in the flowsheet.   Outcome: Progressing Towards Goal  Note: Pressure Injury Interventions:  Sensory Interventions: Assess changes in LOC, Check visual cues for pain, Discuss PT/OT consult with provider, Float heels, Keep linens dry and wrinkle-free, Maintain/enhance activity level, Minimize linen layers, Pressure redistribution bed/mattress (bed type)    Moisture Interventions: Absorbent underpads, Check for incontinence Q2 hours and as needed    Activity Interventions: Increase time out of bed, Pressure redistribution bed/mattress(bed type), PT/OT evaluation    Mobility Interventions: HOB 30 degrees or less, Pressure redistribution bed/mattress (bed type), PT/OT evaluation    Nutrition Interventions: Document food/fluid/supplement intake    Friction and Shear Interventions: Apply protective barrier, creams and emollients, HOB 30 degrees or less, Lift sheet, Minimize layers                Problem: Patient Education: Go to Patient Education Activity  Goal: Patient/Family Education  Outcome: Progressing Towards Goal     Problem: Delirium Treatment  Goal: *Level of consciousness restored to baseline  Outcome: Progressing Towards Goal  Goal: *Level of environmental perceptions restored to baseline  Outcome: Progressing Towards Goal  Goal: *Sensory perception restored to baseline  Outcome: Progressing Towards Goal  Goal: *Emotional stability restored to baseline  Outcome: Progressing Towards Goal  Goal: *Functional assessment restored to baseline  Outcome: Progressing Towards Goal  Goal: *Absence of falls  Outcome: Progressing Towards Goal  Goal: *Will remain free of delirium, CAM Score negative  Outcome: Progressing Towards Goal  Goal: *Cognitive status will be restored to baseline  Outcome: Progressing Towards Goal  Goal: Interventions  Outcome: Progressing Towards Goal     Problem: Patient Education: Go to Patient Education Activity  Goal: Patient/Family Education  Outcome: Progressing Towards Goal     Problem: Patient Education: Go to Patient Education Activity  Goal: Patient/Family Education  Outcome: Progressing Towards Goal     Problem: Nutrition Deficit  Goal: *Optimize nutritional status  Outcome: Progressing Towards Goal     Problem: Seizure Disorder (Adult)  Goal: *STG: Remains free of seizure activity  Outcome: Progressing Towards Goal  Goal: *STG: Maintains lab values within therapeutic range  Outcome: Progressing Towards Goal  Goal: *STG/LTG: Complies with medication therapy  Outcome: Progressing Towards Goal  Goal: *STG: Remains free of injury during seizure activity  Outcome: Progressing Towards Goal  Goal: *STG: Remains safe in hospital  Outcome: Progressing Towards Goal  Goal: Interventions  Outcome: Progressing Towards Goal     Problem: Patient Education: Go to Patient Education Activity  Goal: Patient/Family Education  Outcome: Progressing Towards Goal     Problem: Patient Education: Go to Patient Education Activity  Goal: Patient/Family Education  Outcome: Progressing Towards Goal     Problem: Diabetes Self-Management  Goal: *Disease process and treatment process  Description: Define diabetes and identify own type of diabetes; list 3 options for treating diabetes. Outcome: Progressing Towards Goal  Goal: *Incorporating nutritional management into lifestyle  Description: Describe effect of type, amount and timing of food on blood glucose; list 3 methods for planning meals. Outcome: Progressing Towards Goal  Goal: *Incorporating physical activity into lifestyle  Description: State effect of exercise on blood glucose levels. Outcome: Progressing Towards Goal  Goal: *Developing strategies to promote health/change behavior  Description: Define the ABC's of diabetes; identify appropriate screenings, schedule and personal plan for screenings. Outcome: Progressing Towards Goal  Goal: *Using medications safely  Description: State effect of diabetes medications on diabetes; name diabetes medication taking, action and side effects. Outcome: Progressing Towards Goal  Goal: *Monitoring blood glucose, interpreting and using results  Description: Identify recommended blood glucose targets  and personal targets. Outcome: Progressing Towards Goal  Goal: *Prevention, detection, treatment of acute complications  Description: List symptoms of hyper- and hypoglycemia; describe how to treat low blood sugar and actions for lowering  high blood glucose level. Outcome: Progressing Towards Goal  Goal: *Prevention, detection and treatment of chronic complications  Description: Define the natural course of diabetes and describe the relationship of blood glucose levels to long term complications of diabetes.   Outcome: Progressing Towards Goal  Goal: *Developing strategies to address psychosocial issues  Description: Describe feelings about living with diabetes; identify support needed and support network  Outcome: Progressing Towards Goal  Goal: *Sick day guidelines  Outcome: Progressing Towards Goal     Problem: Patient Education: Go to Patient Education Activity  Goal: Patient/Family Education  Outcome: Progressing Towards Goal

## 2020-07-17 ENCOUNTER — HOSPICE ADMISSION (OUTPATIENT)
Dept: HOSPICE | Facility: HOSPICE | Age: 75
End: 2020-07-17

## 2020-07-17 LAB
BACTERIA SPEC CULT: NORMAL
BACTERIA SPEC CULT: NORMAL
GLUCOSE BLD STRIP.AUTO-MCNC: 110 MG/DL (ref 65–100)
GLUCOSE BLD STRIP.AUTO-MCNC: 134 MG/DL (ref 65–100)
MAGNESIUM SERPL-MCNC: 1.6 MG/DL (ref 1.8–2.4)
SERVICE CMNT-IMP: NORMAL
SERVICE CMNT-IMP: NORMAL

## 2020-07-17 PROCEDURE — 74011250636 HC RX REV CODE- 250/636: Performed by: NURSE PRACTITIONER

## 2020-07-17 PROCEDURE — 74011250637 HC RX REV CODE- 250/637: Performed by: FAMILY MEDICINE

## 2020-07-17 PROCEDURE — 74011250636 HC RX REV CODE- 250/636: Performed by: PSYCHIATRY & NEUROLOGY

## 2020-07-17 PROCEDURE — 83735 ASSAY OF MAGNESIUM: CPT

## 2020-07-17 PROCEDURE — 77030041247 HC PROTECTOR HEEL HEELMEDIX MDII -B

## 2020-07-17 PROCEDURE — 92526 ORAL FUNCTION THERAPY: CPT

## 2020-07-17 PROCEDURE — 74011250637 HC RX REV CODE- 250/637: Performed by: PSYCHIATRY & NEUROLOGY

## 2020-07-17 PROCEDURE — 36415 COLL VENOUS BLD VENIPUNCTURE: CPT

## 2020-07-17 PROCEDURE — 74011000258 HC RX REV CODE- 258: Performed by: INTERNAL MEDICINE

## 2020-07-17 PROCEDURE — 77030040393 HC DRSG OPTIFOAM GENT MDII -B

## 2020-07-17 PROCEDURE — 82962 GLUCOSE BLOOD TEST: CPT

## 2020-07-17 PROCEDURE — 65660000000 HC RM CCU STEPDOWN

## 2020-07-17 PROCEDURE — 74011250637 HC RX REV CODE- 250/637: Performed by: INTERNAL MEDICINE

## 2020-07-17 PROCEDURE — 74011000258 HC RX REV CODE- 258: Performed by: PSYCHIATRY & NEUROLOGY

## 2020-07-17 PROCEDURE — 74011250636 HC RX REV CODE- 250/636: Performed by: INTERNAL MEDICINE

## 2020-07-17 PROCEDURE — 99356 PR PROLONGED SVC I/P OR OBS SETTING 1ST HOUR: CPT | Performed by: NURSE PRACTITIONER

## 2020-07-17 PROCEDURE — 99233 SBSQ HOSP IP/OBS HIGH 50: CPT | Performed by: NURSE PRACTITIONER

## 2020-07-17 PROCEDURE — 74011000250 HC RX REV CODE- 250: Performed by: NURSE PRACTITIONER

## 2020-07-17 RX ORDER — MAGNESIUM SULFATE HEPTAHYDRATE 40 MG/ML
2 INJECTION, SOLUTION INTRAVENOUS ONCE
Status: COMPLETED | OUTPATIENT
Start: 2020-07-17 | End: 2020-07-17

## 2020-07-17 RX ORDER — GLYCOPYRROLATE 0.2 MG/ML
0.2 INJECTION INTRAMUSCULAR; INTRAVENOUS
Status: DISCONTINUED | OUTPATIENT
Start: 2020-07-17 | End: 2020-07-21 | Stop reason: HOSPADM

## 2020-07-17 RX ORDER — LORAZEPAM 2 MG/ML
2 INJECTION INTRAMUSCULAR
Status: DISCONTINUED | OUTPATIENT
Start: 2020-07-17 | End: 2020-07-21 | Stop reason: HOSPADM

## 2020-07-17 RX ORDER — MORPHINE SULFATE 2 MG/ML
4 INJECTION, SOLUTION INTRAMUSCULAR; INTRAVENOUS
Status: DISCONTINUED | OUTPATIENT
Start: 2020-07-17 | End: 2020-07-21 | Stop reason: HOSPADM

## 2020-07-17 RX ORDER — MORPHINE SULFATE 10 MG/ML
10 INJECTION, SOLUTION INTRAMUSCULAR; INTRAVENOUS
Status: DISCONTINUED | OUTPATIENT
Start: 2020-07-17 | End: 2020-07-21 | Stop reason: HOSPADM

## 2020-07-17 RX ORDER — MORPHINE SULFATE 2 MG/ML
2 INJECTION, SOLUTION INTRAMUSCULAR; INTRAVENOUS
Status: DISCONTINUED | OUTPATIENT
Start: 2020-07-17 | End: 2020-07-21 | Stop reason: HOSPADM

## 2020-07-17 RX ADMIN — SODIUM CHLORIDE 3 G: 900 INJECTION, SOLUTION INTRAVENOUS at 07:41

## 2020-07-17 RX ADMIN — SODIUM CHLORIDE 3 G: 900 INJECTION, SOLUTION INTRAVENOUS at 13:36

## 2020-07-17 RX ADMIN — LEVETIRACETAM 1500 MG: 500 INJECTION, SOLUTION INTRAVENOUS at 07:36

## 2020-07-17 RX ADMIN — FOSPHENYTOIN 100 MG PE: 50 INJECTION INTRAMUSCULAR; INTRAVENOUS at 14:58

## 2020-07-17 RX ADMIN — MAGNESIUM SULFATE 2 G: 2 INJECTION INTRAVENOUS at 12:02

## 2020-07-17 RX ADMIN — Medication 10 ML: at 15:10

## 2020-07-17 RX ADMIN — CARVEDILOL 6.25 MG: 6.25 TABLET, FILM COATED ORAL at 16:16

## 2020-07-17 RX ADMIN — FOSPHENYTOIN 100 MG PE: 50 INJECTION INTRAMUSCULAR; INTRAVENOUS at 05:33

## 2020-07-17 RX ADMIN — HEPARIN SODIUM 5000 UNITS: 5000 INJECTION INTRAVENOUS; SUBCUTANEOUS at 05:34

## 2020-07-17 RX ADMIN — CARVEDILOL 6.25 MG: 6.25 TABLET, FILM COATED ORAL at 07:34

## 2020-07-17 RX ADMIN — Medication 5 ML: at 05:34

## 2020-07-17 RX ADMIN — LORAZEPAM 2 MG: 2 INJECTION INTRAMUSCULAR; INTRAVENOUS at 16:31

## 2020-07-17 RX ADMIN — SODIUM CHLORIDE, SODIUM LACTATE, POTASSIUM CHLORIDE, AND CALCIUM CHLORIDE 75 ML/HR: 600; 310; 30; 20 INJECTION, SOLUTION INTRAVENOUS at 05:35

## 2020-07-17 RX ADMIN — Medication 10 ML: at 21:39

## 2020-07-17 RX ADMIN — LEVETIRACETAM 1500 MG: 500 INJECTION, SOLUTION INTRAVENOUS at 21:10

## 2020-07-17 RX ADMIN — TAMSULOSIN HYDROCHLORIDE 0.4 MG: 0.4 CAPSULE ORAL at 07:34

## 2020-07-17 RX ADMIN — CARBIDOPA AND LEVODOPA 1 TABLET: 25; 100 TABLET ORAL at 07:34

## 2020-07-17 RX ADMIN — LISINOPRIL 10 MG: 5 TABLET ORAL at 07:34

## 2020-07-17 RX ADMIN — CARBIDOPA AND LEVODOPA 1 TABLET: 25; 100 TABLET ORAL at 16:16

## 2020-07-17 RX ADMIN — GLYCOPYRROLATE 0.2 MG: 0.2 INJECTION, SOLUTION INTRAMUSCULAR; INTRAVENOUS at 14:58

## 2020-07-17 RX ADMIN — CARBIDOPA AND LEVODOPA 1 TABLET: 25; 100 TABLET ORAL at 21:38

## 2020-07-17 RX ADMIN — SODIUM CHLORIDE 3 G: 900 INJECTION, SOLUTION INTRAVENOUS at 03:28

## 2020-07-17 RX ADMIN — FOSPHENYTOIN 100 MG PE: 50 INJECTION INTRAMUSCULAR; INTRAVENOUS at 21:38

## 2020-07-17 NOTE — PROGRESS NOTES
SPEECH PATHOLOGY NOTE:      Per chart, family has elected to pursue hospice. Will discharge from [address] [place] [address] at this time as comfort measures are now in place.        Rick [address] CCC-SLP

## 2020-07-17 NOTE — PROGRESS NOTES
CM updated by [facility] that patient is on waiting list for placement at [facility], however they are not allowing admissions at this time. Palliative care was able to contact brother who is in agreement with hospice. Consult placed. Message sent to Elaina Spears with palliative care to obtain brother's phone number to review hospice options. No answer at [facility] to obtain their preference on hospice companies.

## 2020-07-17 NOTE — PROGRESS NOTES
Problem: Falls - Risk of  Goal: *Absence of Falls  Description: Document Aldo Lima Fall Risk and appropriate interventions in the flowsheet. Outcome: Progressing Towards Goal  Note: Fall Risk Interventions:       Mentation Interventions: Adequate sleep, hydration, pain control, Bed/chair exit alarm, Door open when patient unattended, Increase mobility, More frequent rounding, Reorient patient    Medication Interventions: Bed/chair exit alarm, Patient to call before getting OOB, Teach patient to arise slowly    Elimination Interventions: Call light in reach, Bed/chair exit alarm, Patient to call for help with toileting needs, Stay With Me (per policy), Toilet paper/wipes in reach, Toileting schedule/hourly rounds              Problem: Patient Education: Go to Patient Education Activity  Goal: Patient/Family Education  Outcome: Progressing Towards Goal     Problem: Pressure Injury - Risk of  Goal: *Prevention of pressure injury  Description: Document Jeffery Scale and appropriate interventions in the flowsheet.   Outcome: Progressing Towards Goal  Note: Pressure Injury Interventions:  Sensory Interventions: Assess changes in LOC, Check visual cues for pain, Discuss PT/OT consult with provider, Float heels, Keep linens dry and wrinkle-free, Maintain/enhance activity level, Minimize linen layers, Pressure redistribution bed/mattress (bed type)    Moisture Interventions: Absorbent underpads, Check for incontinence Q2 hours and as needed, Limit adult briefs, Minimize layers    Activity Interventions: Increase time out of bed, Pressure redistribution bed/mattress(bed type), PT/OT evaluation    Mobility Interventions: HOB 30 degrees or less, Pressure redistribution bed/mattress (bed type), PT/OT evaluation    Nutrition Interventions: Document food/fluid/supplement intake    Friction and Shear Interventions: Apply protective barrier, creams and emollients, HOB 30 degrees or less, Lift sheet                Problem: Patient Education: Go to Patient Education Activity  Goal: Patient/Family Education  Outcome: Progressing Towards Goal     Problem: Delirium Treatment  Goal: *Level of consciousness restored to baseline  Outcome: Progressing Towards Goal  Goal: *Level of environmental perceptions restored to baseline  Outcome: Progressing Towards Goal  Goal: *Sensory perception restored to baseline  Outcome: Progressing Towards Goal  Goal: *Emotional stability restored to baseline  Outcome: Progressing Towards Goal  Goal: *Functional assessment restored to baseline  Outcome: Progressing Towards Goal  Goal: *Absence of falls  Outcome: Progressing Towards Goal  Goal: *Will remain free of delirium, CAM Score negative  Outcome: Progressing Towards Goal  Goal: *Cognitive status will be restored to baseline  Outcome: Progressing Towards Goal  Goal: Interventions  Outcome: Progressing Towards Goal     Problem: Patient Education: Go to Patient Education Activity  Goal: Patient/Family Education  Outcome: Progressing Towards Goal     Problem: Patient Education: Go to Patient Education Activity  Goal: Patient/Family Education  Outcome: Progressing Towards Goal     Problem: Nutrition Deficit  Goal: *Optimize nutritional status  Outcome: Progressing Towards Goal     Problem: Seizure Disorder (Adult)  Goal: *STG: Remains free of seizure activity  Outcome: Progressing Towards Goal  Goal: *STG: Maintains lab values within therapeutic range  Outcome: Progressing Towards Goal  Goal: *STG/LTG: Complies with medication therapy  Outcome: Progressing Towards Goal  Goal: *STG: Remains free of injury during seizure activity  Outcome: Progressing Towards Goal  Goal: *STG: Remains safe in hospital  Outcome: Progressing Towards Goal  Goal: Interventions  Outcome: Progressing Towards Goal     Problem: Patient Education: Go to Patient Education Activity  Goal: Patient/Family Education  Outcome: Progressing Towards Goal     Problem: Patient Education: Go to Patient Education Activity  Goal: Patient/Family Education  Outcome: Progressing Towards Goal     Problem: Diabetes Self-Management  Goal: *Disease process and treatment process  Description: Define diabetes and identify own type of diabetes; list 3 options for treating diabetes. Outcome: Progressing Towards Goal  Goal: *Incorporating nutritional management into lifestyle  Description: Describe effect of type, amount and timing of food on blood glucose; list 3 methods for planning meals. Outcome: Progressing Towards Goal  Goal: *Incorporating physical activity into lifestyle  Description: State effect of exercise on blood glucose levels. Outcome: Progressing Towards Goal  Goal: *Developing strategies to promote health/change behavior  Description: Define the ABC's of diabetes; identify appropriate screenings, schedule and personal plan for screenings. Outcome: Progressing Towards Goal  Goal: *Using medications safely  Description: State effect of diabetes medications on diabetes; name diabetes medication taking, action and side effects. Outcome: Progressing Towards Goal  Goal: *Monitoring blood glucose, interpreting and using results  Description: Identify recommended blood glucose targets  and personal targets. Outcome: Progressing Towards Goal  Goal: *Prevention, detection, treatment of acute complications  Description: List symptoms of hyper- and hypoglycemia; describe how to treat low blood sugar and actions for lowering  high blood glucose level. Outcome: Progressing Towards Goal  Goal: *Prevention, detection and treatment of chronic complications  Description: Define the natural course of diabetes and describe the relationship of blood glucose levels to long term complications of diabetes.   Outcome: Progressing Towards Goal  Goal: *Developing strategies to address psychosocial issues  Description: Describe feelings about living with diabetes; identify support needed and support network  Outcome: Progressing Towards Goal  Goal: *Sick day guidelines  Outcome: Progressing Towards Goal     Problem: Patient Education: Go to Patient Education Activity  Goal: Patient/Family Education  Outcome: Progressing Towards Goal

## 2020-07-17 NOTE — PROGRESS NOTES
Hospitalist Note     Admit Date:  7/10/2020 12:55 AM   Name:  Michelet Sawyer   Age:  77 y.o.  :  1945   MRN:  [id number]   PCP:  Jazlyn Gtz MD  Treatment Team: Attending Provider: Pamella Rubinstein, MD; Utilization Review: Autumn Beckwith; Consulting Provider: Linda Moreno MD; Care Manager: Heaven Méndez RN; Consulting Provider: German Saeed MD; Charge Nurse: Alen Villa; Physical Therapy Assistant: Mira Perla PTA; Consulting Provider: Moses Francis NP    HPI/Subjective:     Mr. Pamela Macdonald is a 76 yo male with PMH of parkinsons disease, schizophrenia, seizures admitted with status epilepticus. He required ICU care for intubation. He has been NPO per SLP evaluations. MBS done 7-15 showed aspiration of thin and nectar. He is on course of IV antibiotics for aspiration pneumonia. 1/2 BC GPR- repeat BC NGTD. He has intermittent aggression and required restraints. He has been seen by tele psyche recommending seroquel prn. On 7-15-20 tele reported afib with RVR that spontaneously converted. ECHO ordered but patient refused. Not anticoagulation candidate. Unable to reach his family. Discharge plans pending. 20 wants to eat      Objective:     Patient Vitals for the past 24 hrs:   Temp Pulse Resp BP SpO2   20 1133 98.4 °F (36.9 °C) (!) 52 17 167/66 95 %   20 0742 98.2 °F (36.8 °C) 83 19 160/72 96 %   20 0343 97.8 °F (36.6 °C) 87 18 119/87 95 %   20 2300 97.9 °F (36.6 °C) 62 16 134/62 91 %   20 1901 97.4 °F (36.3 °C) 69 18 151/66 94 %   20 1459 98.3 °F (36.8 °C) (!) 40 16 158/73 92 %     Oxygen Therapy  O2 Sat (%): 95 % (20 1133)  Pulse via Oximetry: 137 beats per minute (07/15/20 1931)  O2 Device: Room air (07/15/20 1931)  O2 Flow Rate (L/min): 3 l/min (20)  FIO2 (%): 30 % (20)    Estimated body mass index is 24.59 kg/m² as calculated from the following:    Height as of this encounter: 5' 7\" (1.702 m). Weight as of this encounter: 71.2 kg (157 lb). No intake or output data in the 24 hours ending 07/17/20 1259    *Note that automatically entered I/Os may not be accurate; dependent on patient compliance with collection and accurate  by techs. General:    No distress, elderly   CVS:  RRR, no edema   PULM:  Clear anterior   Neuro:  Tremor noted  Psyche: Keeps eyes closed , limited interactions    Data Reviewed:  I have reviewed all labs, meds, and studies from the last 24 hours:  Recent Results (from the past 24 hour(s))   GLUCOSE, POC    Collection Time: 07/16/20  4:14 PM   Result Value Ref Range    Glucose (POC) 171 (H) 65 - 100 mg/dL   CBC WITH AUTOMATED DIFF    Collection Time: 07/16/20  7:50 PM   Result Value Ref Range    WBC 8.0 4.3 - 11.1 K/uL    RBC 3.55 (L) 4.23 - 5.6 M/uL    HGB 11.2 (L) 13.6 - 17.2 g/dL    HCT 34.7 (L) 41.1 - 50.3 %    MCV 97.7 79.6 - 97.8 FL    MCH 31.5 26.1 - 32.9 PG    MCHC 32.3 31.4 - 35.0 g/dL    RDW 13.2 11.9 - 14.6 %    PLATELET 241 838 - 526 K/uL    MPV 10.6 9.4 - 12.3 FL    ABSOLUTE NRBC 0.00 0.0 - 0.2 K/uL    DF AUTOMATED      NEUTROPHILS 57 43 - 78 %    LYMPHOCYTES 27 13 - 44 %    MONOCYTES 11 4.0 - 12.0 %    EOSINOPHILS 5 0.5 - 7.8 %    BASOPHILS 1 0.0 - 2.0 %    IMMATURE GRANULOCYTES 1 0.0 - 5.0 %    ABS. NEUTROPHILS 4.5 1.7 - 8.2 K/UL    ABS. LYMPHOCYTES 2.2 0.5 - 4.6 K/UL    ABS. MONOCYTES 0.9 0.1 - 1.3 K/UL    ABS. EOSINOPHILS 0.4 0.0 - 0.8 K/UL    ABS. BASOPHILS 0.1 0.0 - 0.2 K/UL    ABS. IMM.  GRANS. 0.1 0.0 - 0.5 K/UL   METABOLIC PANEL, BASIC    Collection Time: 07/16/20  7:50 PM   Result Value Ref Range    Sodium 143 136 - 145 mmol/L    Potassium 3.8 3.5 - 5.1 mmol/L    Chloride 107 98 - 107 mmol/L    CO2 26 21 - 32 mmol/L    Anion gap 10 7 - 16 mmol/L    Glucose 134 (H) 65 - 100 mg/dL    BUN 10 8 - 23 MG/DL    Creatinine 0.84 0.8 - 1.5 MG/DL    GFR est AA >60 >60 ml/min/1.73m2    GFR est non-AA >60 >60 ml/min/1.73m2    Calcium 8.3 8.3 - 10.4 MG/DL GLUCOSE, POC    Collection Time: 07/16/20  8:30 PM   Result Value Ref Range    Glucose (POC) 156 (H) 65 - 100 mg/dL   MAGNESIUM    Collection Time: 07/17/20  6:49 AM   Result Value Ref Range    Magnesium 1.6 (L) 1.8 - 2.4 mg/dL   GLUCOSE, POC    Collection Time: 07/17/20  7:42 AM   Result Value Ref Range    Glucose (POC) 110 (H) 65 - 100 mg/dL   GLUCOSE, POC    Collection Time: 07/17/20 11:35 AM   Result Value Ref Range    Glucose (POC) 134 (H) 65 - 100 mg/dL        Current Meds:  Current Facility-Administered Medications   Medication Dose Route Frequency    magnesium sulfate 2 g/50 ml IVPB (premix or compounded)  2 g IntraVENous ONCE    ampicillin-sulbactam (UNASYN) 3 g in 0.9% sodium chloride (MBP/ADV) 100 mL  3 g IntraVENous Q6H    carvediloL (COREG) tablet 6.25 mg  6.25 mg Oral BID WITH MEALS    atorvastatin (LIPITOR) tablet 40 mg  40 mg Oral QHS    lisinopriL (PRINIVIL, ZESTRIL) tablet 10 mg  10 mg Oral DAILY    polyethylene glycol (MIRALAX) packet 17 g  17 g Oral DAILY    QUEtiapine (SEROquel) tablet 6.25 mg  6.25 mg Oral BID PRN    albuterol-ipratropium (DUO-NEB) 2.5 MG-0.5 MG/3 ML  3 mL Nebulization Q4H PRN    carbidopa-levodopa (SINEMET)  mg per tablet 1 Tab  1 Tab Oral TID    lactated Ringers infusion  75 mL/hr IntraVENous CONTINUOUS    insulin lispro (HUMALOG) injection 0-10 Units  0-10 Units SubCUTAneous AC&HS    dextrose 40% (GLUTOSE) oral gel 1 Tube  15 g Oral PRN    glucagon (GLUCAGEN) injection 1 mg  1 mg IntraMUSCular PRN    dextrose (D50W) injection syrg 12.5-25 g  25-50 mL IntraVENous PRN    tamsulosin (FLOMAX) capsule 0.4 mg  0.4 mg Oral DAILY    sodium chloride (NS) flush 5-40 mL  5-40 mL IntraVENous Q8H    sodium chloride (NS) flush 5-40 mL  5-40 mL IntraVENous PRN    heparin (porcine) injection 5,000 Units  5,000 Units SubCUTAneous Q8H    hydrALAZINE (APRESOLINE) 20 mg/mL injection 10 mg  10 mg IntraVENous Q6H PRN    NUTRITIONAL SUPPORT ELECTROLYTE PRN ORDERS   Does Not Apply PRN    levETIRAcetam (KEPPRA) 1,500 mg in 0.9% sodium chloride 100 mL IVPB  1,500 mg IntraVENous Q12H    fosphenytoin (CEREBYX) 100 mg PE in 0.9% sodium chloride 100 mL IVPB  100 mg PE IntraVENous Q8H       Other Studies:  No results found for this visit on 07/10/20. No results found.     All Micro Results     Procedure Component Value Units Date/Time    CULTURE, BLOOD [[id number]] Collected:  07/12/20 1515    Order Status:  Completed Specimen:  Blood Updated:  07/17/20 0833     Special Requests: --        LEFT  Antecubital       Culture result: NO GROWTH 5 DAYS       CULTURE, BLOOD [[id number]] Collected:  07/12/20 1520    Order Status:  Completed Specimen:  Blood Updated:  07/17/20 0833     Special Requests: --        LEFT  HAND       Culture result: NO GROWTH 5 DAYS       CULTURE, RESPIRATORY/SPUTUM/BRONCH Lewanda Poster [[id number]] Collected:  07/12/20 1015    Order Status:  Canceled Specimen:  Sputum           SARS-CoV-2 Lab Results  \"Novel Coronavirus\" Test: No results found for: COV2NT   \"Emergent Disease\" Test: No results found for: EDPR  \"SARS-COV-2\" Test: No results found for: XGCOVT         Assessment and Plan:     Hospital Problems as of 7/17/2020 Date Reviewed: 7/12/2020          Codes Class Noted - Resolved POA    Atrial fibrillation ([facility] ICD-10-CM: I48.91  ICD-9-CM: 427.31  7/15/2020 - Present No        Dysphagia ICD-10-CM: R13.10  ICD-9-CM: 787.20  7/12/2020 - Present Yes        SIRS (systemic inflammatory response syndrome) ([facility] ICD-10-CM: R65.10  ICD-9-CM: 995.90  7/11/2020 - Present Yes        DM2 (diabetes mellitus, type 2) ([facility] (Chronic) ICD-10-CM: E11.9  ICD-9-CM: 250.00  7/10/2020 - Present Yes        Acute respiratory failure with hypoxia ([facility] ICD-10-CM: J96.01  ICD-9-CM: 518.81  7/10/2020 - Present Yes        Essential hypertension (Chronic) ICD-10-CM: I10  ICD-9-CM: 401.9  7/10/2020 - Present Yes        * (Principal) Status epilepticus ([facility] ICD-10-CM: G40.901  ICD-9-CM: 345.3 7/9/2020 - Present Yes        RESOLVED: Lactic acid increased ICD-10-CM: E87.2  ICD-9-CM: 276.2  7/10/2020 - 7/12/2020 Yes              Plan:  · Status epilepticus with chronic seizures:  · Continued IV cerebryx and keppra      · Aspiration with pneumonia:  · On unasyn day 6/7  · NPO followed by SLP  · Unable to reach family for nutritional discussions , dicussed with [facility] palliative care and will keep trying   · Start PPN with nutritional consult       · Schizophrenia:  · seroquel prn agitaiton       · parkinsons disease:  · Continued sinemet      · DM2:  · SSI      · HTN:   · Continued lisinopril, coreg    · Hypokalemia and hypomagnesemia:  · Replace and repeat labs       · AFIB with RVR:  · Stopped propranolol and added coreg  · followup tele  · Poor Anticoagulation candidate   · Check ECHO- he refused attempt  · Currently stable NSR      DC planning/Dispo:  Pending, will need covid testing prior to SNF       Diet:  DIET NPO  DVT ppx:  heparin    Signed:  Aruna Chin MD

## 2020-07-17 NOTE — PROGRESS NOTES
Goals updated 7/15/2020  LTG: Patient will tolerate least restrictive diet without overt signs or symptoms of airway compromise. STG: Patient will participate in po trials with ST in efforts to identify safest least restrictive diet. Added 7/15/2020  STG: Patient will participate in repeat modified barium swallow study as clinically indicated. SPEECH LANGUAGE PATHOLOGY: DYSPHAGIA- Daily Note 2    NAME/AGE/GENDER: Alan Muñoz is a 77 y.o. male  DATE: 7/17/2020  PRIMARY DIAGNOSIS: Status epilepticus ([facility] [G40.901]  Status epilepticus ([facility] [G40.901]      ICD-10: Treatment Diagnosis: R13.12 Dysphagia, Oropharyngeal Phase    RECOMMENDATIONS   DIET:    NPO    MEDICATIONS: Crushed in puree     ASPIRATION PRECAUTIONS  · Slow rate of intake  · Small bites/sips  · Upright at 90 degrees during meal     COMPENSATORY STRATEGIES/MODIFICATIONS  · None     EDUCATION:  · Recommendations discussed with Hospitalist  · Nursing  · Patient     CONTINUATION OF SKILLED SERVICES/MEDICAL NECESSITY:   Patient is expected to demonstrate progress in  swallow strength, swallow timeliness, swallow function and swallow safety in order to  improve swallow safety, work toward diet advancement and decrease aspiration risk.  Patient continues to require skilled intervention due to dysphagia. RECOMMENDATIONS for CONTINUED SPEECH THERAPY:   YES: Anticipate need for ongoing speech therapy during this hospitalization and at next level of care. ASSESSMENT   Patient presents ongoing s/sx pharyngeal residue and airway compromise. Mild throat clearing and coughing with puree this date. Goals remain unclear, particularly if family wishes to accept risk of aspiration with po intake vs. alternative means of nutrition, however unable to safely recommend po diet at this time. Patient continues to request food/drink. Recommend continue NPO with meds crushed in puree. Will continue to follow in efforts to identify po diet.  Discussed with RN and notified MD via [employer]. COMPLIANCE WITH PROGRAM/EXERCISES: Will assess as treatment progresses  REHABILITATION POTENTIAL FOR STATED GOALS: Good    PLAN    FREQUENCY/DURATION: Continue to follow patient 3 times a week for duration of hospital stay to address above goals. - Recommendations for next treatment session: Next treatment will address po trials    SUBJECTIVE   \"are you gonna feed me or what? \"     Problem List:  (Impairments causing functional limitations):  1. Oropharyngeal dysphagia     Orientation:   No response     Pain: Pain Scale 1: FLACC  Pain Intensity 1: 0    OBJECTIVE   Patient seen for po trials. Right sided facial droop. Presented puree only. Patient demonstrated double swallow and throat clearing on initial trial. Coughing prior to swallow initiation and after inconsistently throughout. Attempted to cue for effortful swallows but would become agitated \"stop bothering me\". INTERDISCIPLINARY COLLABORATION: Registered Nurse  PRECAUTIONS/ALLERGIES: Patient has no known allergies.      Tool Used: Dysphagia Outcome and Severity Scale (DEANNE)    Score Comments   Normal Diet  [] 7 With no strategies or extra time needed   Functional Swallow  [] 6 May have mild oral or pharyngeal delay   Mild Dysphagia  [] 5 Which may require one diet consistency restricted    Mild-Moderate Dysphagia  [] 4 With 1-2 diet consistencies restricted   Moderate Dysphagia  [] 3 With 2 or more diet consistencies restricted   Moderate-Severe Dysphagia  [] 2 With partial PO strategies (trials with ST only)   Severe Dysphagia  [] 1 With inability to tolerate any PO safely      Score:  Initial: 2 Most Recent: 2 (Date 07/17/20 )   Interpretation of Tool: The Dysphagia Outcome and Severity Scale (DEANNE) is a simple, easy-to-use, 7-point scale developed to systematically rate the functional severity of dysphagia based on objective assessment and make recommendations for diet level, independence level, and type of nutrition.      After treatment position/precautions:  · Upright in bed  · RN notified  · Call light within reach    Total Treatment Duration:   Time In: 3143  Time Out: [address] [address] [facility], [place], operated by [facility]

## 2020-07-17 NOTE — HOSPICE
[facility]    RN spoke with patient's brother Humphrey Laboy. If patient able to go back to [address] [place] [address] would like [facility] to provide hospice services. OAH to f/up over the weekend on the discharge plan.

## 2020-07-17 NOTE — HOSPICE
[facility]    RN Liaison will review patient with Medical Director to determine eligibility for RHC or GIP hospice admission.          Thank you for this referral.

## 2020-07-17 NOTE — PROGRESS NOTES
Patient is seeking hospice options and is now eating for pleasure as he is considered a high aspiration risk per ST. He has not been able to participate well in therapy so will discharge from acute care at this time.     Taylor Denson PTA

## 2020-07-17 NOTE — HOSPICE
[facility]    RN Liaison reviewed patient with Medical Director Dr. China Meyer. Patient approved for GIP at [facility] for aggressive symptom management of his respiratory failure. Patient would need to be weaned down to 15Lpm oxygen via NC prior to transfer to [facility]. Liaison to f/up with CM, patient and family to determine desired plan.

## 2020-07-17 NOTE — HOSPICE
RN Liaison reviewed patient with Medical Director Dr. Sami Glover. Patient is not eligible for GIP admission to [address]. [facility] if patient is accepted back to that location.       Thank you for this referral.

## 2020-07-17 NOTE — PROGRESS NOTES
HUSSEIN from [facility]. They are the preferred hospice agency at the pt's correction. SAMEERA informed him that pt's dc plan is undecided at this point as it is not definite that the pt can return to his correction with hospice. He provided his contact information in the event the pt does return to the correction with hospice services. He asked that clinical information and order be faxed to [phone] at that time. His contact # is [phone].

## 2020-07-17 NOTE — PROGRESS NOTES
Palliative Care Progress Note    Patient: Fransisco Orantes MRN: [id number]  SSN: [id number]    YOB: 1945  Age: 77 y.o. Sex: male       Assessment/Plan:     Chief Complaint/Interval History: pt is alert and now DNR    Principal Diagnosis:    · Debility, Unspecified  R53.81    Additional Diagnoses:   · Advance Care Planning Counseling Z71.89  · Dysphagia  R13.10  · Counseling, Encounter for Medical Advice  Z71.9  · Encounter for Palliative Care  Z51.5  · Parkinsons    Palliative Performance Scale (PPS)       Medical Decision Making:   Reviewed and summarized notes from last palliative care visit to present. Discussed case with appropriate providers: CARMEN Wheeler, Dr Gustavo Anand, ROSS [facility]  Reviewed lab and X-ray data from last palliative care visit to present. Will discuss findings with members of the interdisciplinary team.      The pt is resting in bed. He does not respond to questions, but bounces his hand rhythmically. Per CARMEN Wheeler, when the pt does interact, he is usually angry. He has told his nurses that he wants to eat a turkey sandwich, but has been made NPO by Speech due to aspiration. We were finally able to reach the pt's brother, Amalia Rodríguez, at his home number, [phone]. Barrera rarely picks up his cell phone. Reviewed with Yumiko Varner the pt's condition. Yumiko Varner is in full agreement that the pt be in hospice care. He said that this is their sister's wish as well. Barrera added, \"Treat him as you would if he were your brother\". A consult has been placed to [address] with request for [facility]. While the pt remains in the hospital we have started comfort measures. Have placed a nursing miscellaneous order for pleasure feeds by hand, following precautions to prevent aspiration. More than 50% of this 35 minute visit was spent counseling and coordination of care as outlined above.   In addition to the E&M described above, more than 50% of a 35-minute prolonged visit, from 1325 - 1400, was spent on counseling and coordination of care. Subjective:     Review of Systems negative with the exception of as noted above     Objective:     Visit Vitals  /66   Pulse (!) 52   Temp 98.4 °F (36.9 °C)   Resp 17   Ht 5' 7\" (1.702 m)   Wt 157 lb (71.2 kg)   SpO2 95%   BMI 24.59 kg/m²       Physical Exam:    General:  Unresponsive. No acute distress. Eyes:  Conjunctivae/corneas clear    Nose: Nares normal. Septum midline. Neck: Supple, symmetrical, trachea midline, no JVD   Lungs:   Clear to auscultation bilaterally, unlabored   Heart:  Regular rate and rhythm, no murmur    Abdomen:   Soft, non-tender, non-distended   Extremities: Normal, atraumatic, no cyanosis or edema   Skin: Skin color, texture, turgor normal. No rash or lesions. Neurologic: Unresponsive. Psych: Unresponsive.      Signed By: Tono Lira NP     July 17, 2020

## 2020-07-18 PROCEDURE — 74011250636 HC RX REV CODE- 250/636: Performed by: NURSE PRACTITIONER

## 2020-07-18 PROCEDURE — 74011250637 HC RX REV CODE- 250/637: Performed by: INTERNAL MEDICINE

## 2020-07-18 PROCEDURE — 74011250637 HC RX REV CODE- 250/637: Performed by: PSYCHIATRY & NEUROLOGY

## 2020-07-18 PROCEDURE — 74011000258 HC RX REV CODE- 258: Performed by: PSYCHIATRY & NEUROLOGY

## 2020-07-18 PROCEDURE — 74011250636 HC RX REV CODE- 250/636: Performed by: PSYCHIATRY & NEUROLOGY

## 2020-07-18 PROCEDURE — 74011250637 HC RX REV CODE- 250/637: Performed by: FAMILY MEDICINE

## 2020-07-18 PROCEDURE — 65660000000 HC RM CCU STEPDOWN

## 2020-07-18 RX ADMIN — CARBIDOPA AND LEVODOPA 1 TABLET: 25; 100 TABLET ORAL at 21:53

## 2020-07-18 RX ADMIN — Medication 5 ML: at 21:59

## 2020-07-18 RX ADMIN — Medication 5 ML: at 13:04

## 2020-07-18 RX ADMIN — LEVETIRACETAM 1500 MG: 500 INJECTION, SOLUTION INTRAVENOUS at 08:51

## 2020-07-18 RX ADMIN — TAMSULOSIN HYDROCHLORIDE 0.4 MG: 0.4 CAPSULE ORAL at 08:44

## 2020-07-18 RX ADMIN — LEVETIRACETAM 1500 MG: 500 INJECTION, SOLUTION INTRAVENOUS at 21:53

## 2020-07-18 RX ADMIN — LISINOPRIL 10 MG: 5 TABLET ORAL at 08:44

## 2020-07-18 RX ADMIN — CARBIDOPA AND LEVODOPA 1 TABLET: 25; 100 TABLET ORAL at 15:53

## 2020-07-18 RX ADMIN — CARVEDILOL 6.25 MG: 6.25 TABLET, FILM COATED ORAL at 08:44

## 2020-07-18 RX ADMIN — CARVEDILOL 6.25 MG: 6.25 TABLET, FILM COATED ORAL at 17:11

## 2020-07-18 RX ADMIN — FOSPHENYTOIN 100 MG PE: 50 INJECTION INTRAMUSCULAR; INTRAVENOUS at 21:58

## 2020-07-18 RX ADMIN — MORPHINE SULFATE 2 MG: 2 INJECTION, SOLUTION INTRAMUSCULAR; INTRAVENOUS at 15:53

## 2020-07-18 RX ADMIN — CARBIDOPA AND LEVODOPA 1 TABLET: 25; 100 TABLET ORAL at 08:44

## 2020-07-18 RX ADMIN — FOSPHENYTOIN 100 MG PE: 50 INJECTION INTRAMUSCULAR; INTRAVENOUS at 13:58

## 2020-07-18 RX ADMIN — POLYETHYLENE GLYCOL 3350 17 G: 17 POWDER, FOR SOLUTION ORAL at 08:44

## 2020-07-18 RX ADMIN — Medication 10 ML: at 06:08

## 2020-07-18 RX ADMIN — FOSPHENYTOIN 100 MG PE: 50 INJECTION INTRAMUSCULAR; INTRAVENOUS at 06:08

## 2020-07-18 NOTE — PROGRESS NOTES
Hospitalist Note     Admit Date:  7/10/2020 12:55 AM   Name:  Sheree Collazo   Age:  77 y.o.  :  1945   MRN:  [id number]   PCP:  Kim Cardenas MD  Treatment Team: Attending Provider: Penelope Pérez MD; Utilization Review: Dorota Paz; Consulting Provider: Ritika Jessica MD; Care Manager: Lidya Hernadez RN; Consulting Provider: Milena Lezama MD; Consulting Provider: Danica Christopher NP; Charge Nurse: Juno Pollock RN    HPI/Subjective:     Mr. Josh Sanchez is a 76 yo male with PMH of parkinsons disease, schizophrenia, seizures admitted with status epilepticus. He required ICU care for intubation. He has been NPO per SLP evaluations. MBS done 7-15 showed aspiration of thin and nectar. He is on course of IV antibiotics for aspiration pneumonia. 1/2 BC GPR- repeat BC NGTD. He has intermittent aggression and required restraints. He has been seen by tele psyche recommending seroquel prn. On 7-15-20 tele reported afib with RVR that spontaneously converted. ECHO ordered but patient refused. Not anticoagulation candidate. On 20 palliative care was able to reach his brother Shekhar Esqueda and have decided to go with comfort care. Discharge plans are pending to hospice at [facility]. 20 says he is hungry       Objective:     Patient Vitals for the past 24 hrs:   Temp Pulse Resp BP SpO2   20 1602 97.7 °F (36.5 °C) 73 19 147/78 90 %   20 1133 98.4 °F (36.9 °C) (!) 52 17 167/66 95 %     Oxygen Therapy  O2 Sat (%): 90 % (20 1602)  Pulse via Oximetry: 137 beats per minute (07/15/20 1931)  O2 Device: Room air (07/15/20 1931)  O2 Flow Rate (L/min): 3 l/min (20)  FIO2 (%): 30 % (20 0905)    Estimated body mass index is 24.59 kg/m² as calculated from the following:    Height as of this encounter: 5' 7\" (1.702 m). Weight as of this encounter: 71.2 kg (157 lb).     No intake or output data in the 24 hours ending 20 1016    *Note that automatically entered I/Os may not be accurate; dependent on patient compliance with collection and accurate  by techs.     General:    No distress, elderly   CVS:  RRR, no edema   PULM:  Clear anterior   Neuro:  Tremor noted  Psyche: Keeps eyes closed , limited interactions    Data Reviewed:  I have reviewed all labs, meds, and studies from the last 24 hours:  Recent Results (from the past 24 hour(s))   GLUCOSE, POC    Collection Time: 07/17/20 11:35 AM   Result Value Ref Range    Glucose (POC) 134 (H) 65 - 100 mg/dL        Current Meds:  Current Facility-Administered Medications   Medication Dose Route Frequency    morphine injection 2 mg  2 mg IntraVENous Q15MIN PRN    Or    morphine injection 4 mg  4 mg IntraVENous Q15MIN PRN    Or    morphine 10 mg/ml injection 10 mg  10 mg IntraVENous Q15MIN PRN    LORazepam (ATIVAN) injection 2 mg  2 mg IntraVENous Q1H PRN    glycopyrrolate (ROBINUL) injection 0.2 mg  0.2 mg IntraVENous Q6H PRN    carvediloL (COREG) tablet 6.25 mg  6.25 mg Oral BID WITH MEALS    lisinopriL (PRINIVIL, ZESTRIL) tablet 10 mg  10 mg Oral DAILY    polyethylene glycol (MIRALAX) packet 17 g  17 g Oral DAILY    QUEtiapine (SEROquel) tablet 6.25 mg  6.25 mg Oral BID PRN    albuterol-ipratropium (DUO-NEB) 2.5 MG-0.5 MG/3 ML  3 mL Nebulization Q4H PRN    carbidopa-levodopa (SINEMET)  mg per tablet 1 Tab  1 Tab Oral TID    dextrose 40% (GLUTOSE) oral gel 1 Tube  15 g Oral PRN    glucagon (GLUCAGEN) injection 1 mg  1 mg IntraMUSCular PRN    dextrose (D50W) injection syrg 12.5-25 g  25-50 mL IntraVENous PRN    tamsulosin (FLOMAX) capsule 0.4 mg  0.4 mg Oral DAILY    sodium chloride (NS) flush 5-40 mL  5-40 mL IntraVENous Q8H    sodium chloride (NS) flush 5-40 mL  5-40 mL IntraVENous PRN    hydrALAZINE (APRESOLINE) 20 mg/mL injection 10 mg  10 mg IntraVENous Q6H PRN    NUTRITIONAL SUPPORT ELECTROLYTE PRN ORDERS   Does Not Apply PRN    levETIRAcetam (KEPPRA) 1,500 mg in 0.9% sodium chloride 100 mL IVPB  1,500 mg IntraVENous Q12H    fosphenytoin (CEREBYX) 100 mg PE in 0.9% sodium chloride 100 mL IVPB  100 mg PE IntraVENous Q8H       Other Studies:  No results found for this visit on 07/10/20. No results found.     All Micro Results     Procedure Component Value Units Date/Time    CULTURE, BLOOD [[id number]] Collected:  07/12/20 1515    Order Status:  Completed Specimen:  Blood Updated:  07/17/20 0833     Special Requests: --        LEFT  Antecubital       Culture result: NO GROWTH 5 DAYS       CULTURE, BLOOD [[id number]] Collected:  07/12/20 1520    Order Status:  Completed Specimen:  Blood Updated:  07/17/20 0833     Special Requests: --        LEFT  HAND       Culture result: NO GROWTH 5 DAYS       CULTURE, RESPIRATORY/SPUTUM/BRONCH Flores Haley [[id number]] Collected:  07/12/20 1015    Order Status:  Canceled Specimen:  Sputum           SARS-CoV-2 Lab Results  \"Novel Coronavirus\" Test: No results found for: COV2NT   \"Emergent Disease\" Test: No results found for: EDPR  \"SARS-COV-2\" Test: No results found for: XGCOVT         Assessment and Plan:     Hospital Problems as of 7/18/2020 Date Reviewed: 7/12/2020          Codes Class Noted - Resolved POA    Atrial fibrillation ([facility] ICD-10-CM: I48.91  ICD-9-CM: 427.31  7/15/2020 - Present No        Dysphagia ICD-10-CM: R13.10  ICD-9-CM: 787.20  7/12/2020 - Present Yes        SIRS (systemic inflammatory response syndrome) ([facility] ICD-10-CM: R65.10  ICD-9-CM: 995.90  7/11/2020 - Present Yes        DM2 (diabetes mellitus, type 2) ([facility] (Chronic) ICD-10-CM: E11.9  ICD-9-CM: 250.00  7/10/2020 - Present Yes        Acute respiratory failure with hypoxia ([facility] ICD-10-CM: J96.01  ICD-9-CM: 518.81  7/10/2020 - Present Yes        Essential hypertension (Chronic) ICD-10-CM: I10  ICD-9-CM: 401.9  7/10/2020 - Present Yes        * (Principal) Status epilepticus ([facility] ICD-10-CM: G40.901  ICD-9-CM: 345.3  7/9/2020 - Present Yes        RESOLVED: Lactic acid increased ICD-10-CM: E87.2  ICD-9-CM: 276.2  7/10/2020 - 7/12/2020 Yes              Plan:  · Status epilepticus with chronic seizures:  · Continued IV cerebryx and keppra      · Aspiration with pneumonia:  · Completed  unasyn day 6/7  · Comfort feeds         · Schizophrenia:  · seroquel prn agitaiton       · parkinsons disease:  · Continued sinemet      · DM2:  · SSI      · HTN:   · Continued lisinopril, coreg    · Hypokalemia and hypomagnesemia:  · Replaced    · AFIB with RVR:  · Stopped propranolol and added coreg  · followup tele  · Poor Anticoagulation candidate   · Ordered  ECHO- he refused attempt  · Currently stable NSR      DC planning/Dispo:  Pending hospice      Diet:  DIET MECHANICAL SOFT  DIET NUTRITIONAL SUPPLEMENTS  DVT ppx:  heparin    Signed:  Ladarius Germain MD

## 2020-07-18 NOTE — PROGRESS NOTES
Problem: Falls - Risk of  Goal: *Absence of Falls  Description: Document Isaiah Liriano Fall Risk and appropriate interventions in the flowsheet. Outcome: Progressing Towards Goal  Note: Fall Risk Interventions:       Mentation Interventions: Bed/chair exit alarm, Door open when patient unattended    Medication Interventions: Bed/chair exit alarm    Elimination Interventions: Bed/chair exit alarm              Problem: Patient Education: Go to Patient Education Activity  Goal: Patient/Family Education  Outcome: Progressing Towards Goal     Problem: Pressure Injury - Risk of  Goal: *Prevention of pressure injury  Description: Document Jeffery Scale and appropriate interventions in the flowsheet.   Outcome: Progressing Towards Goal  Note: Pressure Injury Interventions:  Sensory Interventions: Assess changes in LOC    Moisture Interventions: Check for incontinence Q2 hours and as needed, Apply protective barrier, creams and emollients    Activity Interventions: Pressure redistribution bed/mattress(bed type)    Mobility Interventions: Pressure redistribution bed/mattress (bed type)    Nutrition Interventions: Offer support with meals,snacks and hydration, Document food/fluid/supplement intake    Friction and Shear Interventions: Foam dressings/transparent film/skin sealants                Problem: Patient Education: Go to Patient Education Activity  Goal: Patient/Family Education  Outcome: Progressing Towards Goal     Problem: Delirium Treatment  Goal: *Level of consciousness restored to baseline  Outcome: Progressing Towards Goal  Goal: *Level of environmental perceptions restored to baseline  Outcome: Progressing Towards Goal  Goal: *Sensory perception restored to baseline  Outcome: Progressing Towards Goal  Goal: *Emotional stability restored to baseline  Outcome: Progressing Towards Goal  Goal: *Functional assessment restored to baseline  Outcome: Progressing Towards Goal  Goal: *Absence of falls  Outcome: Progressing Towards Goal  Goal: *Will remain free of delirium, CAM Score negative  Outcome: Progressing Towards Goal  Goal: *Cognitive status will be restored to baseline  Outcome: Progressing Towards Goal  Goal: Interventions  Outcome: Progressing Towards Goal     Problem: Patient Education: Go to Patient Education Activity  Goal: Patient/Family Education  Outcome: Progressing Towards Goal     Problem: Patient Education: Go to Patient Education Activity  Goal: Patient/Family Education  Outcome: Progressing Towards Goal     Problem: Nutrition Deficit  Goal: *Optimize nutritional status  Outcome: Progressing Towards Goal     Problem: Seizure Disorder (Adult)  Goal: *STG: Remains free of seizure activity  Outcome: Progressing Towards Goal  Goal: *STG: Maintains lab values within therapeutic range  Outcome: Progressing Towards Goal  Goal: *STG/LTG: Complies with medication therapy  Outcome: Progressing Towards Goal  Goal: *STG: Remains free of injury during seizure activity  Outcome: Progressing Towards Goal  Goal: *STG: Remains safe in hospital  Outcome: Progressing Towards Goal  Goal: Interventions  Outcome: Progressing Towards Goal     Problem: Patient Education: Go to Patient Education Activity  Goal: Patient/Family Education  Outcome: Progressing Towards Goal     Problem: Patient Education: Go to Patient Education Activity  Goal: Patient/Family Education  Outcome: Progressing Towards Goal     Problem: Diabetes Self-Management  Goal: *Disease process and treatment process  Description: Define diabetes and identify own type of diabetes; list 3 options for treating diabetes. Outcome: Progressing Towards Goal  Goal: *Incorporating nutritional management into lifestyle  Description: Describe effect of type, amount and timing of food on blood glucose; list 3 methods for planning meals.   Outcome: Progressing Towards Goal  Goal: *Incorporating physical activity into lifestyle  Description: State effect of exercise on blood glucose levels. Outcome: Progressing Towards Goal  Goal: *Developing strategies to promote health/change behavior  Description: Define the ABC's of diabetes; identify appropriate screenings, schedule and personal plan for screenings. Outcome: Progressing Towards Goal  Goal: *Using medications safely  Description: State effect of diabetes medications on diabetes; name diabetes medication taking, action and side effects. Outcome: Progressing Towards Goal  Goal: *Monitoring blood glucose, interpreting and using results  Description: Identify recommended blood glucose targets  and personal targets. Outcome: Progressing Towards Goal  Goal: *Prevention, detection, treatment of acute complications  Description: List symptoms of hyper- and hypoglycemia; describe how to treat low blood sugar and actions for lowering  high blood glucose level. Outcome: Progressing Towards Goal  Goal: *Prevention, detection and treatment of chronic complications  Description: Define the natural course of diabetes and describe the relationship of blood glucose levels to long term complications of diabetes.   Outcome: Progressing Towards Goal  Goal: *Developing strategies to address psychosocial issues  Description: Describe feelings about living with diabetes; identify support needed and support network  Outcome: Progressing Towards Goal  Goal: *Sick day guidelines  Outcome: Progressing Towards Goal     Problem: Patient Education: Go to Patient Education Activity  Goal: Patient/Family Education  Outcome: Progressing Towards Goal

## 2020-07-18 NOTE — PROGRESS NOTES
Patient has been accepted by [facility] hosp[facility] for hospice care at his shelter. Case Management will need to discuss with shelter to assure that this plan will work with their capabilities. Case Management will continue to follow. Care Management Interventions  [facility]  Mode of Transport at Discharge: Other (see comment)  Transition of Care Consult (CM Consult): Assisted Living([facility])  Physical Therapy Consult: Yes  Occupational Therapy Consult: Yes  Speech Therapy Consult: Yes  Current Support Network: Assisted Living(brother, Justyna Mak -[phone]/MARÍA have sister, but Justyna Mak takes care of pt.  Grey Canela -[phone]-)  Confirm Follow Up Transport: Other (see comment)  Freedom of Choice List was Provided with Basic Dialogue that Supports the Patient's Individualized Plan of Care/Goals, Treatment Preferences and Shares the Quality Data Associated with the Providers?: Yes  [employer] Information Provided?: (MCR/supplemental/gets RX from [place])  Discharge Location  Discharge Placement: Unable to determine at this time

## 2020-07-19 PROCEDURE — 74011000258 HC RX REV CODE- 258: Performed by: PSYCHIATRY & NEUROLOGY

## 2020-07-19 PROCEDURE — 65660000000 HC RM CCU STEPDOWN

## 2020-07-19 PROCEDURE — 74011250637 HC RX REV CODE- 250/637: Performed by: FAMILY MEDICINE

## 2020-07-19 PROCEDURE — 74011250637 HC RX REV CODE- 250/637: Performed by: PSYCHIATRY & NEUROLOGY

## 2020-07-19 PROCEDURE — 74011250636 HC RX REV CODE- 250/636: Performed by: PSYCHIATRY & NEUROLOGY

## 2020-07-19 PROCEDURE — 74011250637 HC RX REV CODE- 250/637: Performed by: INTERNAL MEDICINE

## 2020-07-19 RX ADMIN — FOSPHENYTOIN 100 MG PE: 50 INJECTION INTRAMUSCULAR; INTRAVENOUS at 21:33

## 2020-07-19 RX ADMIN — LEVETIRACETAM 1500 MG: 500 INJECTION, SOLUTION INTRAVENOUS at 08:59

## 2020-07-19 RX ADMIN — POLYETHYLENE GLYCOL 3350 17 G: 17 POWDER, FOR SOLUTION ORAL at 08:49

## 2020-07-19 RX ADMIN — LEVETIRACETAM 1500 MG: 500 INJECTION, SOLUTION INTRAVENOUS at 21:11

## 2020-07-19 RX ADMIN — FOSPHENYTOIN 100 MG PE: 50 INJECTION INTRAMUSCULAR; INTRAVENOUS at 05:28

## 2020-07-19 RX ADMIN — CARBIDOPA AND LEVODOPA 1 TABLET: 25; 100 TABLET ORAL at 17:46

## 2020-07-19 RX ADMIN — FOSPHENYTOIN 100 MG PE: 50 INJECTION INTRAMUSCULAR; INTRAVENOUS at 14:55

## 2020-07-19 RX ADMIN — CARVEDILOL 6.25 MG: 6.25 TABLET, FILM COATED ORAL at 17:46

## 2020-07-19 RX ADMIN — CARVEDILOL 6.25 MG: 6.25 TABLET, FILM COATED ORAL at 08:48

## 2020-07-19 RX ADMIN — LISINOPRIL 10 MG: 5 TABLET ORAL at 08:49

## 2020-07-19 RX ADMIN — CARBIDOPA AND LEVODOPA 1 TABLET: 25; 100 TABLET ORAL at 08:49

## 2020-07-19 RX ADMIN — TAMSULOSIN HYDROCHLORIDE 0.4 MG: 0.4 CAPSULE ORAL at 08:49

## 2020-07-19 RX ADMIN — Medication 5 ML: at 13:13

## 2020-07-19 RX ADMIN — Medication 10 ML: at 21:11

## 2020-07-19 RX ADMIN — Medication 5 ML: at 05:30

## 2020-07-19 RX ADMIN — CARBIDOPA AND LEVODOPA 1 TABLET: 25; 100 TABLET ORAL at 21:10

## 2020-07-19 NOTE — PROGRESS NOTES
Hospitalist Note     Admit Date:  7/10/2020 12:55 AM   Name:  Chelle Riley   Age:  77 y.o.  :  1945   MRN:  [id number]   PCP:  Shahzad Zhang MD  Treatment Team: Attending Provider: Khalif Brooks MD; Utilization Review: Rochelle Sánchez; Consulting Provider: Fiona Centeno MD; Care Manager: Christopher Stapleton, RN; Consulting Provider: Nae Hua MD; Consulting Provider: Daria Rocha NP; Charge Nurse: Alba Francis    HPI/Subjective:     Mr. Tiffani Polanco is a 76 yo male with PMH of parkinsons disease, schizophrenia, seizures admitted with status epilepticus. He required ICU care for intubation. He has been NPO per SLP evaluations. MBS done 7-15 showed aspiration of thin and nectar. He is on course of IV antibiotics for aspiration pneumonia. / BC GPR- repeat BC NGTD. He has intermittent aggression and required restraints. He has been seen by tele psyche recommending seroquel prn. On 7-15-20 tele reported afib with RVR that spontaneously converted. ECHO ordered but patient refused. Not anticoagulation candidate. On 20 palliative care was able to reach his brother Jose Mobley and have decided to go with comfort care. Discharge plans are pending to hospice at [facility].       20 not verbal today      Objective:     No data found. Oxygen Therapy  O2 Sat (%): 90 % (20 1602)  Pulse via Oximetry: 137 beats per minute (07/15/20 1931)  O2 Device: Room air (07/15/20 1931)  O2 Flow Rate (L/min): 3 l/min (20 2300)  FIO2 (%): 30 % (20 0905)    Estimated body mass index is 24.59 kg/m² as calculated from the following:    Height as of this encounter: 5' 7\" (1.702 m). Weight as of this encounter: 71.2 kg (157 lb). No intake or output data in the 24 hours ending 20 0834    *Note that automatically entered I/Os may not be accurate; dependent on patient compliance with collection and accurate  by techs.     General:    No distress, elderly CVS:  Tachycardic,  no edema   PULM:  Clear anterior   Neuro:  Tremor noted  Psyche: Keeps eyes closed , limited interactions    Data Reviewed:  I have reviewed all labs, meds, and studies from the last 24 hours:  No results found for this or any previous visit (from the past 24 hour(s)). Current Meds:  Current Facility-Administered Medications   Medication Dose Route Frequency    morphine injection 2 mg  2 mg IntraVENous Q15MIN PRN    Or    morphine injection 4 mg  4 mg IntraVENous Q15MIN PRN    Or    morphine 10 mg/ml injection 10 mg  10 mg IntraVENous Q15MIN PRN    LORazepam (ATIVAN) injection 2 mg  2 mg IntraVENous Q1H PRN    glycopyrrolate (ROBINUL) injection 0.2 mg  0.2 mg IntraVENous Q6H PRN    carvediloL (COREG) tablet 6.25 mg  6.25 mg Oral BID WITH MEALS    lisinopriL (PRINIVIL, ZESTRIL) tablet 10 mg  10 mg Oral DAILY    polyethylene glycol (MIRALAX) packet 17 g  17 g Oral DAILY    QUEtiapine (SEROquel) tablet 6.25 mg  6.25 mg Oral BID PRN    albuterol-ipratropium (DUO-NEB) 2.5 MG-0.5 MG/3 ML  3 mL Nebulization Q4H PRN    carbidopa-levodopa (SINEMET)  mg per tablet 1 Tab  1 Tab Oral TID    dextrose 40% (GLUTOSE) oral gel 1 Tube  15 g Oral PRN    glucagon (GLUCAGEN) injection 1 mg  1 mg IntraMUSCular PRN    dextrose (D50W) injection syrg 12.5-25 g  25-50 mL IntraVENous PRN    tamsulosin (FLOMAX) capsule 0.4 mg  0.4 mg Oral DAILY    sodium chloride (NS) flush 5-40 mL  5-40 mL IntraVENous Q8H    sodium chloride (NS) flush 5-40 mL  5-40 mL IntraVENous PRN    hydrALAZINE (APRESOLINE) 20 mg/mL injection 10 mg  10 mg IntraVENous Q6H PRN    NUTRITIONAL SUPPORT ELECTROLYTE PRN ORDERS   Does Not Apply PRN    levETIRAcetam (KEPPRA) 1,500 mg in 0.9% sodium chloride 100 mL IVPB  1,500 mg IntraVENous Q12H    fosphenytoin (CEREBYX) 100 mg PE in 0.9% sodium chloride 100 mL IVPB  100 mg PE IntraVENous Q8H       Other Studies:  No results found for this visit on 07/10/20.     No results found.     All Micro Results     Procedure Component Value Units Date/Time    CULTURE, BLOOD [[id number]] Collected:  07/12/20 1515    Order Status:  Completed Specimen:  Blood Updated:  07/17/20 0833     Special Requests: --        LEFT  Antecubital       Culture result: NO GROWTH 5 DAYS       CULTURE, BLOOD [[id number]] Collected:  07/12/20 1520    Order Status:  Completed Specimen:  Blood Updated:  07/17/20 0833     Special Requests: --        LEFT  HAND       Culture result: NO GROWTH 5 DAYS       CULTURE, RESPIRATORY/SPUTUM/BRONCH [place]u [[id number]] Collected:  07/12/20 1015    Order Status:  Canceled Specimen:  Sputum           SARS-CoV-2 Lab Results  \"Novel Coronavirus\" Test: No results found for: COV2NT   \"Emergent Disease\" Test: No results found for: EDPR  \"SARS-COV-2\" Test: No results found for: XGCOVT         Assessment and Plan:     Hospital Problems as of 7/19/2020 Date Reviewed: 7/12/2020          Codes Class Noted - Resolved POA    Atrial fibrillation ([facility] ICD-10-CM: I48.91  ICD-9-CM: 427.31  7/15/2020 - Present No        Dysphagia ICD-10-CM: R13.10  ICD-9-CM: 787.20  7/12/2020 - Present Yes        SIRS (systemic inflammatory response syndrome) ([facility] ICD-10-CM: R65.10  ICD-9-CM: 995.90  7/11/2020 - Present Yes        DM2 (diabetes mellitus, type 2) ([facility] (Chronic) ICD-10-CM: E11.9  ICD-9-CM: 250.00  7/10/2020 - Present Yes        Acute respiratory failure with hypoxia ([facility] ICD-10-CM: J96.01  ICD-9-CM: 518.81  7/10/2020 - Present Yes        Essential hypertension (Chronic) ICD-10-CM: I10  ICD-9-CM: 401.9  7/10/2020 - Present Yes        * (Principal) Status epilepticus ([facility] ICD-10-CM: G40.901  ICD-9-CM: 345.3  7/9/2020 - Present Yes        RESOLVED: Lactic acid increased ICD-10-CM: E87.2  ICD-9-CM: 276.2  7/10/2020 - 7/12/2020 Yes              Plan:      · Status epilepticus with chronic seizures:  · Continued IV cerebryx and keppra, can change to oral meds upon discharge as tolerant      · Aspiration with pneumonia:  · Completed  unasyn day 6/7  · Comfort feeds         · Schizophrenia:  · seroquel prn agitaiton       · parkinsons disease:  · Continued sinemet      · DM2:  · SSI      · HTN:   · Continued lisinopril, coreg    · Hypokalemia and hypomagnesemia:  · Replaced    · AFIB with RVR:  · Stopped propranolol and added coreg  · followup tele  · Poor Anticoagulation candidate   · Ordered  ECHO- he refused attempt  · Currently stable NSR      DC planning/Dispo:  Pending hospice      Diet:  DIET MECHANICAL SOFT  DIET NUTRITIONAL SUPPLEMENTS  DVT ppx:  heparin    Signed:  Aruna Chin MD

## 2020-07-19 NOTE — PROGRESS NOTES
Received call from Dr Franklin Castellanos about pt possible dc today back to [address] [place] [address], calls x3 made without success of speaking with someone who could say whether pt could return or not today. Dr Franklin Castellanos updated.

## 2020-07-19 NOTE — PROGRESS NOTES
Problem: Falls - Risk of  Goal: *Absence of Falls  Description: Document Shauna Clemens Fall Risk and appropriate interventions in the flowsheet. Outcome: Progressing Towards Goal  Note: Fall Risk Interventions:       Mentation Interventions: Bed/chair exit alarm, Door open when patient unattended    Medication Interventions: Bed/chair exit alarm    Elimination Interventions: Bed/chair exit alarm              Problem: Patient Education: Go to Patient Education Activity  Goal: Patient/Family Education  Outcome: Progressing Towards Goal     Problem: Pressure Injury - Risk of  Goal: *Prevention of pressure injury  Description: Document Jeffery Scale and appropriate interventions in the flowsheet.   Outcome: Progressing Towards Goal  Note: Pressure Injury Interventions:  Sensory Interventions: Assess changes in LOC    Moisture Interventions: Check for incontinence Q2 hours and as needed, Apply protective barrier, creams and emollients    Activity Interventions: Pressure redistribution bed/mattress(bed type)    Mobility Interventions: HOB 30 degrees or less    Nutrition Interventions: Offer support with meals,snacks and hydration    Friction and Shear Interventions: Foam dressings/transparent film/skin sealants                Problem: Patient Education: Go to Patient Education Activity  Goal: Patient/Family Education  Outcome: Progressing Towards Goal     Problem: Delirium Treatment  Goal: *Level of consciousness restored to baseline  Outcome: Progressing Towards Goal  Goal: *Level of environmental perceptions restored to baseline  Outcome: Progressing Towards Goal  Goal: *Sensory perception restored to baseline  Outcome: Progressing Towards Goal  Goal: *Emotional stability restored to baseline  Outcome: Progressing Towards Goal  Goal: *Functional assessment restored to baseline  Outcome: Progressing Towards Goal  Goal: *Absence of falls  Outcome: Progressing Towards Goal  Goal: *Will remain free of delirium, CAM Score negative  Outcome: Progressing Towards Goal  Goal: *Cognitive status will be restored to baseline  Outcome: Progressing Towards Goal  Goal: Interventions  Outcome: Progressing Towards Goal     Problem: Patient Education: Go to Patient Education Activity  Goal: Patient/Family Education  Outcome: Progressing Towards Goal     Problem: Patient Education: Go to Patient Education Activity  Goal: Patient/Family Education  Outcome: Progressing Towards Goal     Problem: Nutrition Deficit  Goal: *Optimize nutritional status  Outcome: Progressing Towards Goal     Problem: Seizure Disorder (Adult)  Goal: *STG: Remains free of seizure activity  Outcome: Progressing Towards Goal  Goal: *STG: Maintains lab values within therapeutic range  Outcome: Progressing Towards Goal  Goal: *STG/LTG: Complies with medication therapy  Outcome: Progressing Towards Goal  Goal: *STG: Remains free of injury during seizure activity  Outcome: Progressing Towards Goal  Goal: *STG: Remains safe in hospital  Outcome: Progressing Towards Goal  Goal: Interventions  Outcome: Progressing Towards Goal     Problem: Patient Education: Go to Patient Education Activity  Goal: Patient/Family Education  Outcome: Progressing Towards Goal     Problem: Patient Education: Go to Patient Education Activity  Goal: Patient/Family Education  Outcome: Progressing Towards Goal     Problem: Diabetes Self-Management  Goal: *Disease process and treatment process  Description: Define diabetes and identify own type of diabetes; list 3 options for treating diabetes. Outcome: Progressing Towards Goal  Goal: *Incorporating nutritional management into lifestyle  Description: Describe effect of type, amount and timing of food on blood glucose; list 3 methods for planning meals. Outcome: Progressing Towards Goal  Goal: *Incorporating physical activity into lifestyle  Description: State effect of exercise on blood glucose levels.   Outcome: Progressing Towards Goal  Goal: *Developing strategies to promote health/change behavior  Description: Define the ABC's of diabetes; identify appropriate screenings, schedule and personal plan for screenings. Outcome: Progressing Towards Goal  Goal: *Using medications safely  Description: State effect of diabetes medications on diabetes; name diabetes medication taking, action and side effects. Outcome: Progressing Towards Goal  Goal: *Monitoring blood glucose, interpreting and using results  Description: Identify recommended blood glucose targets  and personal targets. Outcome: Progressing Towards Goal  Goal: *Prevention, detection, treatment of acute complications  Description: List symptoms of hyper- and hypoglycemia; describe how to treat low blood sugar and actions for lowering  high blood glucose level. Outcome: Progressing Towards Goal  Goal: *Prevention, detection and treatment of chronic complications  Description: Define the natural course of diabetes and describe the relationship of blood glucose levels to long term complications of diabetes.   Outcome: Progressing Towards Goal  Goal: *Developing strategies to address psychosocial issues  Description: Describe feelings about living with diabetes; identify support needed and support network  Outcome: Progressing Towards Goal  Goal: *Sick day guidelines  Outcome: Progressing Towards Goal     Problem: Patient Education: Go to Patient Education Activity  Goal: Patient/Family Education  Outcome: Progressing Towards Goal

## 2020-07-19 NOTE — HOSPICE
Patient has been approved for RHC  with [facility]. Should patient discharge today please call office at [phone] so admission can be arranged. Otherwise [facility] Liaison will continue to follow to determine discharge date.      Cameron Atkins RN BSN  [facility]

## 2020-07-20 VITALS
WEIGHT: 157 LBS | HEIGHT: 67 IN | TEMPERATURE: 98.4 F | SYSTOLIC BLOOD PRESSURE: 162 MMHG | DIASTOLIC BLOOD PRESSURE: 76 MMHG | RESPIRATION RATE: 17 BRPM | HEART RATE: 73 BPM | BODY MASS INDEX: 24.64 KG/M2 | OXYGEN SATURATION: 91 %

## 2020-07-20 PROCEDURE — 74011250636 HC RX REV CODE- 250/636: Performed by: NURSE PRACTITIONER

## 2020-07-20 PROCEDURE — 65660000000 HC RM CCU STEPDOWN

## 2020-07-20 PROCEDURE — 74011250637 HC RX REV CODE- 250/637: Performed by: INTERNAL MEDICINE

## 2020-07-20 PROCEDURE — 74011250637 HC RX REV CODE- 250/637: Performed by: PSYCHIATRY & NEUROLOGY

## 2020-07-20 PROCEDURE — 74011250636 HC RX REV CODE- 250/636: Performed by: PSYCHIATRY & NEUROLOGY

## 2020-07-20 PROCEDURE — 74011250637 HC RX REV CODE- 250/637: Performed by: FAMILY MEDICINE

## 2020-07-20 PROCEDURE — 74011000258 HC RX REV CODE- 258: Performed by: PSYCHIATRY & NEUROLOGY

## 2020-07-20 RX ADMIN — LEVETIRACETAM 1500 MG: 500 INJECTION, SOLUTION INTRAVENOUS at 20:00

## 2020-07-20 RX ADMIN — LEVETIRACETAM 1500 MG: 500 INJECTION, SOLUTION INTRAVENOUS at 07:49

## 2020-07-20 RX ADMIN — FOSPHENYTOIN 100 MG PE: 50 INJECTION INTRAMUSCULAR; INTRAVENOUS at 14:17

## 2020-07-20 RX ADMIN — LORAZEPAM 2 MG: 2 INJECTION INTRAMUSCULAR; INTRAVENOUS at 12:59

## 2020-07-20 RX ADMIN — Medication 10 ML: at 19:09

## 2020-07-20 RX ADMIN — TAMSULOSIN HYDROCHLORIDE 0.4 MG: 0.4 CAPSULE ORAL at 07:41

## 2020-07-20 RX ADMIN — POLYETHYLENE GLYCOL 3350 17 G: 17 POWDER, FOR SOLUTION ORAL at 07:41

## 2020-07-20 RX ADMIN — FOSPHENYTOIN 100 MG PE: 50 INJECTION INTRAMUSCULAR; INTRAVENOUS at 05:32

## 2020-07-20 RX ADMIN — LISINOPRIL 10 MG: 5 TABLET ORAL at 07:41

## 2020-07-20 RX ADMIN — CARBIDOPA AND LEVODOPA 1 TABLET: 25; 100 TABLET ORAL at 19:04

## 2020-07-20 RX ADMIN — FOSPHENYTOIN 100 MG PE: 50 INJECTION INTRAMUSCULAR; INTRAVENOUS at 21:01

## 2020-07-20 RX ADMIN — CARBIDOPA AND LEVODOPA 1 TABLET: 25; 100 TABLET ORAL at 07:41

## 2020-07-20 RX ADMIN — CARBIDOPA AND LEVODOPA 1 TABLET: 25; 100 TABLET ORAL at 15:26

## 2020-07-20 RX ADMIN — Medication 10 ML: at 05:32

## 2020-07-20 RX ADMIN — LORAZEPAM 2 MG: 2 INJECTION INTRAMUSCULAR; INTRAVENOUS at 21:09

## 2020-07-20 RX ADMIN — Medication 10 ML: at 15:26

## 2020-07-20 NOTE — PROGRESS NOTES
Comprehensive Nutrition Assessment    Type and Reason for Visit: Reassess    Nutrition Assessment:  Hx of DM, parkinson's. Presented with seizure. Intubated in ED at [facility] 7/9 and transferred to [facility]. Exutbated 7/11. Failed MBS 7/15, palliative care consulted 7/17, plan established for comfort feeds and transition back to [facility] with hospice. Curled in bed at RD visit, reports he eats what he wants. Minimally interactive with RD. States he doesn't particularly care for the magic cup but declines for me to discontinue the order as he sometimes eats it. Estimated Daily Nutrient Needs: Based on admission weight. Energy (kcal):  0355-8010 kcal/d (20-25 kcal/kg)  Protein (g):  73-80 gm/d (0.8-1 gm/kg)       Fluid (ml/day):  1 ml/kcal or per MD    Current Nutrition Therapies:   DIET MECHANICAL SOFT 3 Honey/3 Moderately Thick  DIET NUTRITIONAL SUPPLEMENTS Lunch, Dinner; Magic Cups ( )    Anthropometric Measures:  · Height:  5' 7\" (170.2 cm)  · Current Body Wt:  71.2 kg (156 lb 15.5 oz)   Last 3 Recorded Weights in this Encounter    07/10/20 0200 07/15/20 1302   Weight: 71.3 kg (157 lb 3 oz) 71.2 kg (157 lb)        Nutrition Diagnosis:   · Inadequate oral intake related to swallowing difficulty as evidenced by other (specify)(diet for comfort s/p failed MBS w varied intake)      Nutrition Interventions:   Food and/or Nutrient Delivery: Continue current diet(for comfort) Oral supplement to continue per pt preference. Coordination of Nutrition Care:  Other (specify)(Discussed with Palomo Castorena RN)    Goals:  Maintain po intake for comfort as desired       Nutrition Monitoring and Evaluation:   Food/Nutrient Intake Outcomes: Food and nutrient intake    Discharge Planning:    Continue current diet(for comfort)     Electronically signed by Yoni Canas MA, RD, LDN on 7/20/2020 at 3:54 PM  Contact: [phone]

## 2020-07-20 NOTE — PROGRESS NOTES
Hospitalist Note     Admit Date:  7/10/2020 12:55 AM   Name:  Chelle Riley   Age:  77 y.o.  :  1945   MRN:  [id number]   PCP:  Shahzad Zhang MD  Treatment Team: Attending Provider: Candace Barraza MD; Utilization Review: Rochelle Sánchez; Consulting Provider: Fiona Centneo MD; Care Manager: Christopher Stapleton, RN; Consulting Provider: Nae Hua MD; Consulting Provider: Daria Rocha NP; Charge Nurse: Alba Francis    HPI/Subjective:     Mr. Tiffani Polanco is a 76 yo male with PMH of parkinsons disease, schizophrenia, seizures admitted with status epilepticus. He required ICU care for intubation. He has been NPO per SLP evaluations. MBS done 7-15 showed aspiration of thin and nectar. He is on course of IV antibiotics for aspiration pneumonia. 1/ BC GPR- repeat BC NGTD. He has intermittent aggression and required restraints. He has been seen by tele psyche recommending seroquel prn. On 7-15-20 tele reported afib with RVR that spontaneously converted. ECHO ordered but patient refused. Not anticoagulation candidate. On 20 palliative care was able to reach his brother Jose Mobley and have decided to go with comfort care. Discharge plans are pending to hospice at [facility].        minimally verbal today but clearly does not want to interact. Await approval for DC plans  Stable on comfort care      Objective:     Patient Vitals for the past 24 hrs:   Temp Pulse Resp BP SpO2   2029 97.7 °F (36.5 °C) (!) 54 18 164/74 94 %     Oxygen Therapy  O2 Sat (%): 94 % (20)  Pulse via Oximetry: 137 beats per minute (07/15/20 1931)  O2 Device: Room air (07/15/20 1931)  O2 Flow Rate (L/min): 3 l/min (20)  FIO2 (%): 30 % (20)    Estimated body mass index is 24.59 kg/m² as calculated from the following:    Height as of this encounter: 5' 7\" (1.702 m). Weight as of this encounter: 71.2 kg (157 lb).       Intake/Output Summary (Last 24 hours) at 2020 2947  Last data filed at 7/20/2020 1417  Gross per 24 hour   Intake 355 ml   Output --   Net 355 ml       *Note that automatically entered I/Os may not be accurate; dependent on patient compliance with collection and accurate  by techs. General:    No distress, elderly   CVS:  Tachycardic,  no edema   PULM:  Clear anterior   Neuro:  Tremor noted  Psyche: Keeps eyes closed , limited interactions    Data Reviewed:  I have reviewed all labs, meds, and studies from the last 24 hours:  No results found for this or any previous visit (from the past 24 hour(s)).      Current Meds:  Current Facility-Administered Medications   Medication Dose Route Frequency    morphine injection 2 mg  2 mg IntraVENous Q15MIN PRN    Or    morphine injection 4 mg  4 mg IntraVENous Q15MIN PRN    Or    morphine 10 mg/ml injection 10 mg  10 mg IntraVENous Q15MIN PRN    LORazepam (ATIVAN) injection 2 mg  2 mg IntraVENous Q1H PRN    glycopyrrolate (ROBINUL) injection 0.2 mg  0.2 mg IntraVENous Q6H PRN    carvediloL (COREG) tablet 6.25 mg  6.25 mg Oral BID WITH MEALS    lisinopriL (PRINIVIL, ZESTRIL) tablet 10 mg  10 mg Oral DAILY    polyethylene glycol (MIRALAX) packet 17 g  17 g Oral DAILY    QUEtiapine (SEROquel) tablet 6.25 mg  6.25 mg Oral BID PRN    albuterol-ipratropium (DUO-NEB) 2.5 MG-0.5 MG/3 ML  3 mL Nebulization Q4H PRN    carbidopa-levodopa (SINEMET)  mg per tablet 1 Tab  1 Tab Oral TID    dextrose 40% (GLUTOSE) oral gel 1 Tube  15 g Oral PRN    glucagon (GLUCAGEN) injection 1 mg  1 mg IntraMUSCular PRN    dextrose (D50W) injection syrg 12.5-25 g  25-50 mL IntraVENous PRN    tamsulosin (FLOMAX) capsule 0.4 mg  0.4 mg Oral DAILY    sodium chloride (NS) flush 5-40 mL  5-40 mL IntraVENous Q8H    sodium chloride (NS) flush 5-40 mL  5-40 mL IntraVENous PRN    hydrALAZINE (APRESOLINE) 20 mg/mL injection 10 mg  10 mg IntraVENous Q6H PRN    NUTRITIONAL SUPPORT ELECTROLYTE PRN ORDERS   Does Not Apply PRN    levETIRAcetam (KEPPRA) 1,500 mg in 0.9% sodium chloride 100 mL IVPB  1,500 mg IntraVENous Q12H    fosphenytoin (CEREBYX) 100 mg PE in 0.9% sodium chloride 100 mL IVPB  100 mg PE IntraVENous Q8H       Other Studies:  No results found for this visit on 07/10/20. No results found.     All Micro Results     Procedure Component Value Units Date/Time    CULTURE, BLOOD [[id number]] Collected:  07/12/20 1515    Order Status:  Completed Specimen:  Blood Updated:  07/17/20 0833     Special Requests: --        LEFT  Antecubital       Culture result: NO GROWTH 5 DAYS       CULTURE, BLOOD [[id number]] Collected:  07/12/20 1520    Order Status:  Completed Specimen:  Blood Updated:  07/17/20 0833     Special Requests: --        LEFT  HAND       Culture result: NO GROWTH 5 DAYS       CULTURE, RESPIRATORY/SPUTUM/BRONCH Rosalind Fisher [[id number]] Collected:  07/12/20 1015    Order Status:  Canceled Specimen:  Sputum           SARS-CoV-2 Lab Results  \"Novel Coronavirus\" Test: No results found for: COV2NT   \"Emergent Disease\" Test: No results found for: EDPR  \"SARS-COV-2\" Test: No results found for: XGCOVT         Assessment and Plan:     Hospital Problems as of 7/20/2020 Date Reviewed: 7/12/2020          Codes Class Noted - Resolved POA    Atrial fibrillation ([facility] ICD-10-CM: I48.91  ICD-9-CM: 427.31  7/15/2020 - Present No        Dysphagia ICD-10-CM: R13.10  ICD-9-CM: 787.20  7/12/2020 - Present Yes        SIRS (systemic inflammatory response syndrome) ([facility] ICD-10-CM: R65.10  ICD-9-CM: 995.90  7/11/2020 - Present Yes        DM2 (diabetes mellitus, type 2) ([facility] (Chronic) ICD-10-CM: E11.9  ICD-9-CM: 250.00  7/10/2020 - Present Yes        Acute respiratory failure with hypoxia ([facility] ICD-10-CM: J96.01  ICD-9-CM: 518.81  7/10/2020 - Present Yes        Essential hypertension (Chronic) ICD-10-CM: I10  ICD-9-CM: 401.9  7/10/2020 - Present Yes        * (Principal) Status epilepticus ([facility] ICD-10-CM: G40.901  ICD-9-CM: 345.3  7/9/2020 - Present Yes        RESOLVED: Lactic acid increased ICD-10-CM: E87.2  ICD-9-CM: 276.2  7/10/2020 - 7/12/2020 Yes              Plan:      · Status epilepticus with chronic seizures:  · Continued IV cerebryx and keppra, can change to oral meds upon discharge as tolerant      · Aspiration with pneumonia:  · Completed  unasyn   · Comfort feeds         · Schizophrenia:  · seroquel prn agitaiton       · parkinsons disease:  · Continued sinemet      · DM2:  · SSI      · HTN:   · Continued lisinopril, coreg    · Hypokalemia and hypomagnesemia:  · Replaced    · AFIB with RVR:  · Stopped propranolol and added coreg  · followup tele  · Poor Anticoagulation candidate   · Ordered  ECHO- he refused attempt  · Currently stable NSR      DC planning/Dispo:  Pending hospice at [facility]      Diet:  DIET MECHANICAL SOFT  DIET NUTRITIONAL SUPPLEMENTS  DVT ppx:  heparin    Signed:  Benjamin Jaquez MD

## 2020-07-20 NOTE — PROGRESS NOTES
Problem: Falls - Risk of  Goal: *Absence of Falls  Description: Document Cristine Butt Fall Risk and appropriate interventions in the flowsheet. Outcome: Progressing Towards Goal  Note: Fall Risk Interventions:       Mentation Interventions: Bed/chair exit alarm    Medication Interventions: Bed/chair exit alarm    Elimination Interventions: Bed/chair exit alarm              Problem: Patient Education: Go to Patient Education Activity  Goal: Patient/Family Education  Outcome: Progressing Towards Goal     Problem: Pressure Injury - Risk of  Goal: *Prevention of pressure injury  Description: Document Jeffery Scale and appropriate interventions in the flowsheet.   Outcome: Progressing Towards Goal  Note: Pressure Injury Interventions:  Sensory Interventions: Assess changes in LOC    Moisture Interventions: Check for incontinence Q2 hours and as needed, Apply protective barrier, creams and emollients    Activity Interventions: Pressure redistribution bed/mattress(bed type)    Mobility Interventions: Pressure redistribution bed/mattress (bed type)    Nutrition Interventions: Offer support with meals,snacks and hydration    Friction and Shear Interventions: Foam dressings/transparent film/skin sealants                Problem: Patient Education: Go to Patient Education Activity  Goal: Patient/Family Education  Outcome: Progressing Towards Goal     Problem: Delirium Treatment  Goal: *Level of consciousness restored to baseline  Outcome: Progressing Towards Goal  Goal: *Level of environmental perceptions restored to baseline  Outcome: Progressing Towards Goal  Goal: *Sensory perception restored to baseline  Outcome: Progressing Towards Goal  Goal: *Emotional stability restored to baseline  Outcome: Progressing Towards Goal  Goal: *Functional assessment restored to baseline  Outcome: Progressing Towards Goal  Goal: *Absence of falls  Outcome: Progressing Towards Goal  Goal: *Will remain free of delirium, CAM Score negative  Outcome: Progressing Towards Goal  Goal: *Cognitive status will be restored to baseline  Outcome: Progressing Towards Goal  Goal: Interventions  Outcome: Progressing Towards Goal     Problem: Patient Education: Go to Patient Education Activity  Goal: Patient/Family Education  Outcome: Progressing Towards Goal     Problem: Patient Education: Go to Patient Education Activity  Goal: Patient/Family Education  Outcome: Progressing Towards Goal     Problem: Nutrition Deficit  Goal: *Optimize nutritional status  Outcome: Progressing Towards Goal     Problem: Seizure Disorder (Adult)  Goal: *STG: Remains free of seizure activity  Outcome: Progressing Towards Goal  Goal: *STG: Maintains lab values within therapeutic range  Outcome: Progressing Towards Goal  Goal: *STG/LTG: Complies with medication therapy  Outcome: Progressing Towards Goal  Goal: *STG: Remains free of injury during seizure activity  Outcome: Progressing Towards Goal  Goal: *STG: Remains safe in hospital  Outcome: Progressing Towards Goal  Goal: Interventions  Outcome: Progressing Towards Goal     Problem: Patient Education: Go to Patient Education Activity  Goal: Patient/Family Education  Outcome: Progressing Towards Goal     Problem: Patient Education: Go to Patient Education Activity  Goal: Patient/Family Education  Outcome: Progressing Towards Goal     Problem: Diabetes Self-Management  Goal: *Disease process and treatment process  Description: Define diabetes and identify own type of diabetes; list 3 options for treating diabetes. Outcome: Progressing Towards Goal  Goal: *Incorporating nutritional management into lifestyle  Description: Describe effect of type, amount and timing of food on blood glucose; list 3 methods for planning meals. Outcome: Progressing Towards Goal  Goal: *Incorporating physical activity into lifestyle  Description: State effect of exercise on blood glucose levels.   Outcome: Progressing Towards Goal  Goal: *Developing strategies to promote health/change behavior  Description: Define the ABC's of diabetes; identify appropriate screenings, schedule and personal plan for screenings. Outcome: Progressing Towards Goal  Goal: *Using medications safely  Description: State effect of diabetes medications on diabetes; name diabetes medication taking, action and side effects. Outcome: Progressing Towards Goal  Goal: *Monitoring blood glucose, interpreting and using results  Description: Identify recommended blood glucose targets  and personal targets. Outcome: Progressing Towards Goal  Goal: *Prevention, detection, treatment of acute complications  Description: List symptoms of hyper- and hypoglycemia; describe how to treat low blood sugar and actions for lowering  high blood glucose level. Outcome: Progressing Towards Goal  Goal: *Prevention, detection and treatment of chronic complications  Description: Define the natural course of diabetes and describe the relationship of blood glucose levels to long term complications of diabetes.   Outcome: Progressing Towards Goal  Goal: *Developing strategies to address psychosocial issues  Description: Describe feelings about living with diabetes; identify support needed and support network  Outcome: Progressing Towards Goal  Goal: *Sick day guidelines  Outcome: Progressing Towards Goal     Problem: Patient Education: Go to Patient Education Activity  Goal: Patient/Family Education  Outcome: Progressing Towards Goal     Problem: Seizure Disorder (Adult)  Goal: *STG: Remains free of seizure activity  Outcome: Progressing Towards Goal

## 2020-07-21 PROCEDURE — 87635 SARS-COV-2 COVID-19 AMP PRB: CPT

## 2020-07-21 PROCEDURE — 74011000258 HC RX REV CODE- 258: Performed by: PSYCHIATRY & NEUROLOGY

## 2020-07-21 PROCEDURE — 74011250637 HC RX REV CODE- 250/637: Performed by: PSYCHIATRY & NEUROLOGY

## 2020-07-21 PROCEDURE — 74011250637 HC RX REV CODE- 250/637: Performed by: INTERNAL MEDICINE

## 2020-07-21 PROCEDURE — 77030040393 HC DRSG OPTIFOAM GENT MDII -B

## 2020-07-21 PROCEDURE — 99231 SBSQ HOSP IP/OBS SF/LOW 25: CPT | Performed by: NURSE PRACTITIONER

## 2020-07-21 PROCEDURE — 74011250636 HC RX REV CODE- 250/636: Performed by: PSYCHIATRY & NEUROLOGY

## 2020-07-21 PROCEDURE — 74011250637 HC RX REV CODE- 250/637: Performed by: FAMILY MEDICINE

## 2020-07-21 RX ORDER — CARBIDOPA AND LEVODOPA 25; 100 MG/1; MG/1
1 TABLET ORAL 3 TIMES DAILY
Qty: 90 TAB | Refills: 0 | Status: SHIPPED | OUTPATIENT
Start: 2020-07-21

## 2020-07-21 RX ORDER — QUETIAPINE FUMARATE 25 MG/1
6.25 TABLET, FILM COATED ORAL
Qty: 60 TAB | Refills: 0 | Status: SHIPPED | OUTPATIENT
Start: 2020-07-21

## 2020-07-21 RX ORDER — PHENYTOIN SODIUM 100 MG/1
300 CAPSULE, EXTENDED RELEASE ORAL DAILY
Qty: 90 CAP | Refills: 0 | Status: SHIPPED | OUTPATIENT
Start: 2020-07-21

## 2020-07-21 RX ORDER — LEVETIRACETAM 750 MG/1
1500 TABLET ORAL 2 TIMES DAILY
Qty: 120 TAB | Refills: 0 | Status: SHIPPED | OUTPATIENT
Start: 2020-07-21

## 2020-07-21 RX ORDER — CARVEDILOL 6.25 MG/1
6.25 TABLET ORAL 2 TIMES DAILY WITH MEALS
Qty: 60 TAB | Refills: 0 | Status: SHIPPED | OUTPATIENT
Start: 2020-07-21

## 2020-07-21 RX ADMIN — LISINOPRIL 10 MG: 5 TABLET ORAL at 07:55

## 2020-07-21 RX ADMIN — TAMSULOSIN HYDROCHLORIDE 0.4 MG: 0.4 CAPSULE ORAL at 07:55

## 2020-07-21 RX ADMIN — POLYETHYLENE GLYCOL 3350 17 G: 17 POWDER, FOR SOLUTION ORAL at 08:12

## 2020-07-21 RX ADMIN — QUETIAPINE FUMARATE 6.25 MG: 25 TABLET ORAL at 07:55

## 2020-07-21 RX ADMIN — Medication 10 ML: at 05:30

## 2020-07-21 RX ADMIN — LEVETIRACETAM 1500 MG: 500 INJECTION, SOLUTION INTRAVENOUS at 08:11

## 2020-07-21 RX ADMIN — CARBIDOPA AND LEVODOPA 1 TABLET: 25; 100 TABLET ORAL at 07:55

## 2020-07-21 RX ADMIN — FOSPHENYTOIN 100 MG PE: 50 INJECTION INTRAMUSCULAR; INTRAVENOUS at 05:29

## 2020-07-21 RX ADMIN — CARVEDILOL 6.25 MG: 6.25 TABLET, FILM COATED ORAL at 07:55

## 2020-07-21 NOTE — PROGRESS NOTES
Problem: Falls - Risk of  Goal: *Absence of Falls  Description: Document Yvette Weller Fall Risk and appropriate interventions in the flowsheet. Outcome: Progressing Towards Goal  Note: Fall Risk Interventions:       Mentation Interventions: Bed/chair exit alarm, Door open when patient unattended, More frequent rounding, Reorient patient    Medication Interventions: Evaluate medications/consider consulting pharmacy, Patient to call before getting OOB, Teach patient to arise slowly    Elimination Interventions: Bed/chair exit alarm, Call light in reach, Patient to call for help with toileting needs, Toileting schedule/hourly rounds              Problem: Patient Education: Go to Patient Education Activity  Goal: Patient/Family Education  Outcome: Progressing Towards Goal     Problem: Pressure Injury - Risk of  Goal: *Prevention of pressure injury  Description: Document Jeffery Scale and appropriate interventions in the flowsheet.   Outcome: Progressing Towards Goal  Note: Pressure Injury Interventions:  Sensory Interventions: Check visual cues for pain, Keep linens dry and wrinkle-free, Minimize linen layers, Pressure redistribution bed/mattress (bed type)    Moisture Interventions: Absorbent underpads, Check for incontinence Q2 hours and as needed, Internal/External urinary devices, Limit adult briefs, Minimize layers    Activity Interventions: Pressure redistribution bed/mattress(bed type)    Mobility Interventions: HOB 30 degrees or less, Pressure redistribution bed/mattress (bed type)    Nutrition Interventions: Document food/fluid/supplement intake, Offer support with meals,snacks and hydration    Friction and Shear Interventions: Foam dressings/transparent film/skin sealants, HOB 30 degrees or less, Minimize layers                Problem: Patient Education: Go to Patient Education Activity  Goal: Patient/Family Education  Outcome: Progressing Towards Goal     Problem: Delirium Treatment  Goal: *Level of consciousness restored to baseline  Outcome: Progressing Towards Goal  Goal: *Level of environmental perceptions restored to baseline  Outcome: Progressing Towards Goal  Goal: *Sensory perception restored to baseline  Outcome: Progressing Towards Goal  Goal: *Emotional stability restored to baseline  Outcome: Progressing Towards Goal  Goal: *Functional assessment restored to baseline  Outcome: Progressing Towards Goal  Goal: *Absence of falls  Outcome: Progressing Towards Goal  Goal: *Will remain free of delirium, CAM Score negative  Outcome: Progressing Towards Goal  Goal: *Cognitive status will be restored to baseline  Outcome: Progressing Towards Goal  Goal: Interventions  Outcome: Progressing Towards Goal     Problem: Patient Education: Go to Patient Education Activity  Goal: Patient/Family Education  Outcome: Progressing Towards Goal     Problem: Patient Education: Go to Patient Education Activity  Goal: Patient/Family Education  Outcome: Progressing Towards Goal     Problem: Nutrition Deficit  Goal: *Optimize nutritional status  Outcome: Progressing Towards Goal     Problem: Seizure Disorder (Adult)  Goal: *STG: Remains free of seizure activity  Outcome: Progressing Towards Goal  Goal: *STG: Maintains lab values within therapeutic range  Outcome: Progressing Towards Goal  Goal: *STG/LTG: Complies with medication therapy  Outcome: Progressing Towards Goal  Goal: *STG: Remains free of injury during seizure activity  Outcome: Progressing Towards Goal  Goal: *STG: Remains safe in hospital  Outcome: Progressing Towards Goal  Goal: Interventions  Outcome: Progressing Towards Goal     Problem: Patient Education: Go to Patient Education Activity  Goal: Patient/Family Education  Outcome: Progressing Towards Goal     Problem: Patient Education: Go to Patient Education Activity  Goal: Patient/Family Education  Outcome: Progressing Towards Goal     Problem: Diabetes Self-Management  Goal: *Disease process and treatment process  Description: Define diabetes and identify own type of diabetes; list 3 options for treating diabetes. Outcome: Progressing Towards Goal  Goal: *Incorporating nutritional management into lifestyle  Description: Describe effect of type, amount and timing of food on blood glucose; list 3 methods for planning meals. Outcome: Progressing Towards Goal  Goal: *Incorporating physical activity into lifestyle  Description: State effect of exercise on blood glucose levels. Outcome: Progressing Towards Goal  Goal: *Developing strategies to promote health/change behavior  Description: Define the ABC's of diabetes; identify appropriate screenings, schedule and personal plan for screenings. Outcome: Progressing Towards Goal  Goal: *Using medications safely  Description: State effect of diabetes medications on diabetes; name diabetes medication taking, action and side effects. Outcome: Progressing Towards Goal  Goal: *Monitoring blood glucose, interpreting and using results  Description: Identify recommended blood glucose targets  and personal targets. Outcome: Progressing Towards Goal  Goal: *Prevention, detection, treatment of acute complications  Description: List symptoms of hyper- and hypoglycemia; describe how to treat low blood sugar and actions for lowering  high blood glucose level. Outcome: Progressing Towards Goal  Goal: *Prevention, detection and treatment of chronic complications  Description: Define the natural course of diabetes and describe the relationship of blood glucose levels to long term complications of diabetes.   Outcome: Progressing Towards Goal  Goal: *Developing strategies to address psychosocial issues  Description: Describe feelings about living with diabetes; identify support needed and support network  Outcome: Progressing Towards Goal  Goal: *Sick day guidelines  Outcome: Progressing Towards Goal     Problem: Patient Education: Go to Patient Education Activity  Goal: Patient/Family Education  Outcome: Progressing Towards Goal

## 2020-07-21 NOTE — PROGRESS NOTES
Palliative Care Progress Note    Patient: Harish Calderon MRN: [id number]  SSN: [id number]    YOB: 1945  Age: 77 y.o. Sex: male       Assessment/Plan:     Chief Complaint/Interval History: pending discharge       Principal Diagnosis:    · Debility, Unspecified  R53.81    Additional Diagnoses:   · Dysphagia  R13.10  · Failure to Thrive  R62.7  · Fatigue, Lethargy  R53.83  · Frailty  R54  · Encounter for Palliative Care  Z51.5    Palliative Performance Scale (PPS)       Medical Decision Making:   Reviewed and summarized notes from last palliative care visit to present. Discussed case with appropriate providers: none       Pt resting in bed, no distress noted. He did not answer questions, but appeared comfortable. Pt to be discharged back to his residential today, with hospice support. No further PC needs. Thank you for allowing us to participate in Mr Kiara Jones' care. More than 50% of this 15 minute visit was spent counseling and coordination of care as outlined above. Subjective:     Review of Systems not obtained due to pt factors: he did not answer questions      Objective:     Visit Vitals  /76 (BP 1 Location: Left arm, BP Patient Position: At rest)   Pulse 73   Temp 98.4 °F (36.9 °C)   Resp 17   Ht 5' 7\" (1.702 m)   Wt 157 lb (71.2 kg)   SpO2 91%   BMI 24.59 kg/m²       Physical Exam:    General:  No acute distress. Eyes:  Conjunctivae/corneas clear    Nose: Nares normal. Septum midline.    Neck: Supple, symmetrical, trachea midline   Lungs:   unlabored   Heart:  Deferred    Abdomen:   Soft, non-tender, non-distended   Extremities: Normal, atraumatic, no cyanosis or edema   Skin: Skin color, texture, turgor normal.   Neurologic: Nonfocal   Psych: Unable      Signed By: Alley Pedraza NP     July 21, 2020

## 2020-07-21 NOTE — HOSPICE
[facility]-    Pt discharged back to [facility] today and when I contacted his brother/HCPOA, Malissa Clarke, to coordinate signing hospice consents he stated that he lived in [place] and that he had not seen his brother in 2 yrs and was unable to come and sign hospice consents. We discussed if I could fax them or scan them to him and he stated that they would need to be mailed. I explained that consents allowed us to care for the pt and that would possibly delay our care for 1 week. I asked if there was anyone local that could sign with his permission and he stated no. I called and discussed the above with  of [facility], [place], and she had no local contacts for the pt listed. I stated that the pt had a CM through the [place]. and that I would try to reach her. I was unable to find any [facility] for [facility] and when I called [place] back she stated that she contacted [facility] and that they were on there way to [place] to get consents signed with pt's brother and that they would be providing the hospice care at the [facility]. She thanked me for my efforts.     Thank you for this referral-    Yael Shah RN BSN  Hospice Liaison  [phone]

## 2020-07-21 NOTE — PROGRESS NOTES
Primary RN attempted to call report to facility who states that they were told he was not returning until tomorrow and they thought he was going to a [place] facility. CM called , Gianna Cintia, who states that she was also told the patient would not return until tomorrow. This CM reminded  that I was the one who spoke with her and that she stated they would prefer patient could come today, preferably before lunch , but definitely before 3:00 and she was very surprised that we could get a covid in 24 hours.  states understanding. Notified her that transport has been set up for 12:40 and that nurse would be calling report. Care Management Interventions  [facility] by [facility] Yes([facility]  Mode of Transport at Discharge:  Other (see comment)  Transition of Care Consult (CM Consult): Assisted Living(Back to [facility], with [facility]. )  Physical Therapy Consult: Yes  Occupational Therapy Consult: Yes  Speech Therapy Consult: Yes  Current Support Network: Assisted Living  Confirm Follow Up Transport: Other (see comment)  The Patient and/or Patient Representative was Provided with a Choice of Provider and Agrees with the Discharge Plan?: Yes  Name of the Patient Representative Who was Provided with a Choice of Provider and Agrees with the Discharge Plan: [address] of Choice List was Provided with Basic Dialogue that Supports the Patient's Individualized Plan of Care/Goals, Treatment Preferences and Shares the Quality Data Associated with the Providers?: Yes  [facility] [employer] Information Provided?: (MCR/supplemental/gets RX from [place])  Discharge Location  Discharge Placement: Assisted Living

## 2020-07-21 NOTE — DISCHARGE SUMMARY
Hospitalist Discharge Summary     Patient ID:  Amado Bunn  [id number]  10 y.o.  1945  Admit date: 7/10/2020 12:55 AM  Discharge date and time: 7/21/2020  Attending: Gary Farfan MD  PCP:  Ta Paz MD  Treatment Team: Attending Provider: Gary Farfan MD; Utilization Review: Maryjane Calderon; Consulting Provider: Myriam Dick MD; Care Manager: Elisabet Prieto RN; Consulting Provider: Luanne Delgadillo MD; Consulting Provider: Daniela Prather NP; Charge Nurse: Anna Perez    Principal Diagnosis Status epilepticus [facility])   Principal Problem:    Status epilepticus ([facility] (7/9/2020)    Active Problems:    DM2 (diabetes mellitus, type 2) ([address] (7/10/2020)      Acute respiratory failure with hypoxia ([address] (7/10/2020)      Essential hypertension (7/10/2020)      Dysphagia (7/12/2020)      SIRS (systemic inflammatory response syndrome) ([address] (7/11/2020)      Atrial fibrillation ([facility] [address] (7/15/2020)             Hospital Course:  Please refer to the admission H&P for details of presentation. In summary, the patient is a 69yo M with hx parkinsons, schizophrenia, and seizures who was admitted with status epilepticus. Required ICU care for mechanical ventilation. Treated for aspiration pneumonia. MBS shows aspiration thin and nectar. Has been intermittently agitated and telepsych recommends seroqueel as needed  Continues keppra and sinemet. Neuro added dilantin. No further seizure activity. New diagnosis of AF RVR but not a candidate for anticoagulation. Started coreg. Converted to sinus. Palliative care consulted and the patient's family elected to have him begin hospice care back at [facility] (pending rapid COVID screening test). He has remained confused and uncooperative. Significant Diagnostic Studies:   XR SWALLOW FUNC VIDEO   Final Result   IMPRESSION: Aspiration of thin barium liquid with penetration of nectar   consistent barium. The patient tolerated honey consistency barium well. For more   detailed evaluation of the swallowing study please refer to the separately   dictated speech pathologist report         DUPLEX UPPER EXT VENOUS RIGHT   Final Result   IMPRESSION: Negative right upper extremity venous duplex exam. No evidence for   DVT. XR CHEST SNGL V   Final Result      XR CHEST SNGL V   Final Result      CTA CODE NEURO HEAD AND NECK W CONT   ED Interpretation   1. Severely stenotic petrous segment of the intracranial left internal carotid artery. 2. Linear scarring/atelectasis in the anterior right upper lobe with otherwise diffuse mild emphysematous disease. Final Result   Impression:   1. No acute arterial large vessel occlusion is identified. 2.  There is an approximately 70 to 80% stenosis in the petrous segment of the   left ICA. Multifocal areas of mild to moderate stenoses downstream in the left   carotid siphon. The right carotid siphon also demonstrates moderate stenoses. 3.  Moderate stenosis of the proximal right PCA. 4.  Emphysematous changes in the lungs with right upper lobe atelectasis/scar. CT PERF W CBF   Final Result   IMPRESSION:      No evidence of CT cerebral perfusion defect. Please note that the determination of patient treatment is not based solely upon   imaging factors or calculation values. Management of ischemia is at the   discretion of the primary physician and is based upon a combination of clinical   and imaging data, along other factors. CT CODE NEURO HEAD WO CONTRAST   Final Result   IMPRESSION: No CT evidence of acute intracranial abnormality. XR CHEST SNGL V   Final Result   IMPRESSION: No acute process, in this intubated patient. XR ABD (KUB)   Final Result      XR CHEST SNGL V   Final Result            Labs: Results:       Chemistry No results for input(s): GLU, NA, K, CL, CO2, BUN, CREA, CA, AGAP, BUCR, TBIL, AP, TP, ALB, GLOB, AGRAT in the last 72 hours.     No lab exists for component: GPT   CBC w/Diff No results for input(s): WBC, RBC, HGB, HCT, PLT, GRANS, LYMPH, EOS, HGBEXT, HCTEXT, PLTEXT in the last 72 hours. Cardiac Enzymes No results for input(s): CPK, CKND1, NARINDER in the last 72 hours. No lab exists for component: CKRMB, TROIP   Coagulation No results for input(s): PTP, INR, APTT, INREXT in the last 72 hours. Lipid Panel No results found for: CHOL, CHOLPOCT, CHOLX, CHLST, CHOLV, 251104, HDL, HDLP, LDL, LDLC, DLDLP, 410363, VLDLC, VLDL, TGLX, TRIGL, TRIGP, TGLPOCT, CHHD, CHHDX   BNP No results for input(s): BNPP in the last 72 hours. Liver Enzymes No results for input(s): TP, ALB, TBIL, AP in the last 72 hours. No lab exists for component: SGOT, GPT, DBIL   Thyroid Studies No results found for: T4, T3U, TSH, TSHEXT         Discharge Exam:  Visit Vitals  /76 (BP 1 Location: Left arm, BP Patient Position: At rest)   Pulse 73   Temp 98.4 °F (36.9 °C)   Resp 17   Ht 5' 7\" (1.702 m)   Wt 71.2 kg (157 lb)   SpO2 91%   BMI 24.59 kg/m²     General:          No distress, elderly   CVS:                Tachycardic,  no edema   PULM:             Clear anterior   Neuro:             Tremor noted  Psyche:           Keeps eyes closed , limited interactions    Disposition: [facility]   Discharge Condition: stable  Patient Instructions:   Current Discharge Medication List      START taking these medications    Details   QUEtiapine (SEROquel) 25 mg tablet Take 0.25 Tabs by mouth two (2) times daily as needed for Other (severe agitation). Qty: 60 Tab, Refills: 0      phenytoin ER (DILANTIN ER) 100 mg ER capsule Take 3 Caps by mouth daily. Qty: 90 Cap, Refills: 0      carbidopa-levodopa (SINEMET)  mg per tablet Take 1 Tab by mouth three (3) times daily. Qty: 90 Tab, Refills: 0         CONTINUE these medications which have CHANGED    Details   levETIRAcetam (KEPPRA) 750 mg tablet Take 2 Tabs by mouth two (2) times a day.   Qty: 120 Tab, Refills: 0         CONTINUE these medications which have NOT CHANGED    Details   albuterol (PROVENTIL HFA, VENTOLIN HFA, PROAIR HFA) 90 mcg/actuation inhaler Take 2 Puffs by inhalation every six (6) hours as needed for Wheezing. Qty: 1 Inhaler, Refills: 0         STOP taking these medications       alogliptin (NESINA) 6.25 mg tablet Comments:   Reason for Stopping:         amantadine HCl (SYMMETREL) 100 mg capsule Comments:   Reason for Stopping:         atorvastatin (LIPITOR) 40 mg tablet Comments:   Reason for Stopping:         LORazepam (ATIVAN) 0.5 mg tablet Comments:   Reason for Stopping:         tamsulosin (FLOMAX) 0.4 mg capsule Comments:   Reason for Stopping:         magnesium oxide (MAG-OX) 400 mg tablet Comments:   Reason for Stopping:         risperiDONE (RISPERDAL M-TAB) 2 mg disintegrating tablet Comments:   Reason for Stopping:         metFORMIN (GLUCOPHAGE) 1,000 mg tablet Comments:   Reason for Stopping:         glipiZIDE (GLUCOTROL) 10 mg tablet Comments:   Reason for Stopping:         divalproex ER (DEPAKOTE ER) 250 mg ER tablet Comments:   Reason for Stopping:         sAXagliptin (ONGLYZA) 5 mg tab tablet Comments:   Reason for Stopping:         diphenhydrAMINE (BENADRYL) 25 mg capsule Comments:   Reason for Stopping:         polyethylene glycol (MIRALAX) 17 gram packet Comments:   Reason for Stopping:         lisinopril (PRINIVIL, ZESTRIL) 10 mg tablet Comments:   Reason for Stopping:         propranolol (INDERAL) 10 mg tablet Comments:   Reason for Stopping:         aspirin 81 mg chewable tablet Comments:   Reason for Stopping:               Activity: Activity as tolerated  Diet: DIET MECHANICAL SOFT  DIET NUTRITIONAL SUPPLEMENTS      Follow-up:   No orders of the defined types were placed in this encounter.           Time spent to discharge patient 35 minutes  Signed:  Darling Zarco MD  7/21/2020  11:13 AM

## 2020-07-21 NOTE — PROGRESS NOTES
Spoke with  of [facility] and they can accept patient back today with hospice orders and Covid testing. They will need prescriptions for ANY new medications. Dr. Yane Gonzales notified. Covid testing ordered and primary RN notified. Hospice liaison notified of upcoming discharge. They can see patient at facility starting tomorrow. Facility does NOT need new PPD. Care Management Interventions  [facility]  Mode of Transport at Discharge:  Other (see comment)  Transition of Care Consult (CM Consult): Assisted Living(Back to [facility], [address], with [facility]. )  Physical Therapy Consult: Yes  Occupational Therapy Consult: Yes  Speech Therapy Consult: Yes  Current Support Network: Assisted Living  Confirm Follow Up Transport: Other (see comment)  The Patient and/or Patient Representative was Provided with a Choice of Provider and Agrees with the Discharge Plan?: Yes  Name of the Patient Representative Who was Provided with a Choice of Provider and Agrees with the Discharge Plan: [address] of Choice List was Provided with Basic Dialogue that Supports the Patient's Individualized Plan of Care/Goals, Treatment Preferences and Shares the Quality Data Associated with the Providers?: Yes  [employer] Information Provided?: (MCR/supplemental/gets RX from [place])  Discharge Location  Discharge Placement: Assisted Living

## 2020-07-22 ENCOUNTER — PATIENT OUTREACH (OUTPATIENT)
Dept: CASE MANAGEMENT | Age: 75
End: 2020-07-22

## 2020-07-22 LAB
COVID-19 RAPID TEST, COVR: NOT DETECTED
SARS COV-2, XPGCVT: NEGATIVE
SOURCE, COVRS: NORMAL

## 2020-07-22 NOTE — PROGRESS NOTES
Patient discharged on 7/21/20 to [address] [place] [address]. No OPAL outreach indicated due to discharge to hospice care.